# Patient Record
Sex: MALE | Race: WHITE | NOT HISPANIC OR LATINO | Employment: OTHER | URBAN - METROPOLITAN AREA
[De-identification: names, ages, dates, MRNs, and addresses within clinical notes are randomized per-mention and may not be internally consistent; named-entity substitution may affect disease eponyms.]

---

## 2017-01-03 ENCOUNTER — HOSPITAL ENCOUNTER (OUTPATIENT)
Dept: PHYSICAL THERAPY | Facility: HOSPITAL | Age: 62
Setting detail: RECURRING SERIES
Discharge: HOME | End: 2017-02-27
Attending: ORTHOPAEDIC SURGERY | Admitting: ORTHOPAEDIC SURGERY

## 2017-01-18 ENCOUNTER — HOSPITAL ENCOUNTER (OUTPATIENT)
Dept: ORTHOPEDIC SURGERY | Facility: CLINIC | Age: 62
Discharge: HOME | End: 2017-01-18
Attending: ORTHOPAEDIC SURGERY | Admitting: ORTHOPAEDIC SURGERY

## 2017-08-26 ENCOUNTER — HOSPITAL ENCOUNTER (OUTPATIENT)
Dept: SLEEP MEDICINE | Facility: HOSPITAL | Age: 62
Discharge: HOME | End: 2017-08-26
Attending: INTERNAL MEDICINE | Admitting: INTERNAL MEDICINE

## 2017-09-17 ENCOUNTER — HOSPITAL ENCOUNTER (OUTPATIENT)
Dept: SLEEP MEDICINE | Facility: HOSPITAL | Age: 62
Discharge: HOME | End: 2017-09-17
Attending: INTERNAL MEDICINE | Admitting: INTERNAL MEDICINE

## 2019-08-12 RX ORDER — DILTIAZEM HYDROCHLORIDE 360 MG/1
CAPSULE, EXTENDED RELEASE ORAL
Qty: 30 CAPSULE | Refills: 1 | Status: SHIPPED | OUTPATIENT
Start: 2019-08-12 | End: 2019-08-15 | Stop reason: SDUPTHER

## 2019-08-15 RX ORDER — DILTIAZEM HYDROCHLORIDE 360 MG/1
CAPSULE, EXTENDED RELEASE ORAL
Qty: 30 CAPSULE | Refills: 1 | Status: SHIPPED | OUTPATIENT
Start: 2019-08-15 | End: 2019-10-14 | Stop reason: SDUPTHER

## 2019-10-14 RX ORDER — DILTIAZEM HYDROCHLORIDE 360 MG/1
CAPSULE, EXTENDED RELEASE ORAL
Qty: 30 CAPSULE | Refills: 0 | Status: SHIPPED | OUTPATIENT
Start: 2019-10-14 | End: 2020-01-16

## 2019-12-16 RX ORDER — DILTIAZEM HYDROCHLORIDE 360 MG/1
CAPSULE, EXTENDED RELEASE ORAL
Qty: 30 CAPSULE | Refills: 0 | Status: SHIPPED | OUTPATIENT
Start: 2019-12-16 | End: 2020-06-16

## 2020-01-16 RX ORDER — DILTIAZEM HYDROCHLORIDE 360 MG/1
CAPSULE, EXTENDED RELEASE ORAL
Qty: 30 CAPSULE | Refills: 0 | Status: SHIPPED | OUTPATIENT
Start: 2020-01-16 | End: 2020-03-11

## 2020-02-06 ENCOUNTER — OFFICE (AMBULATORY)
Dept: URBAN - METROPOLITAN AREA PATHOLOGY 4 | Facility: PATHOLOGY | Age: 65
End: 2020-02-06
Payer: MEDICARE

## 2020-02-06 ENCOUNTER — OFFICE (AMBULATORY)
Dept: URBAN - METROPOLITAN AREA PATHOLOGY 4 | Facility: PATHOLOGY | Age: 65
End: 2020-02-06

## 2020-02-06 ENCOUNTER — ON CAMPUS - OUTPATIENT (AMBULATORY)
Dept: URBAN - METROPOLITAN AREA HOSPITAL 2 | Facility: HOSPITAL | Age: 65
End: 2020-02-06
Payer: COMMERCIAL

## 2020-02-06 ENCOUNTER — OFFICE (AMBULATORY)
Dept: URBAN - METROPOLITAN AREA PATHOLOGY 4 | Facility: PATHOLOGY | Age: 65
End: 2020-02-06
Payer: COMMERCIAL

## 2020-02-06 VITALS
RESPIRATION RATE: 18 BRPM | TEMPERATURE: 97 F | DIASTOLIC BLOOD PRESSURE: 75 MMHG | HEART RATE: 65 BPM | RESPIRATION RATE: 16 BRPM | DIASTOLIC BLOOD PRESSURE: 83 MMHG | HEART RATE: 59 BPM | OXYGEN SATURATION: 93 % | OXYGEN SATURATION: 96 % | HEIGHT: 72 IN | SYSTOLIC BLOOD PRESSURE: 125 MMHG | OXYGEN SATURATION: 99 % | DIASTOLIC BLOOD PRESSURE: 74 MMHG | SYSTOLIC BLOOD PRESSURE: 121 MMHG | SYSTOLIC BLOOD PRESSURE: 128 MMHG | WEIGHT: 247 LBS | DIASTOLIC BLOOD PRESSURE: 77 MMHG | SYSTOLIC BLOOD PRESSURE: 145 MMHG | HEART RATE: 51 BPM | SYSTOLIC BLOOD PRESSURE: 141 MMHG | DIASTOLIC BLOOD PRESSURE: 71 MMHG | DIASTOLIC BLOOD PRESSURE: 81 MMHG | HEART RATE: 57 BPM | OXYGEN SATURATION: 97 % | HEART RATE: 58 BPM | SYSTOLIC BLOOD PRESSURE: 143 MMHG | SYSTOLIC BLOOD PRESSURE: 131 MMHG | OXYGEN SATURATION: 95 % | DIASTOLIC BLOOD PRESSURE: 96 MMHG | HEART RATE: 64 BPM

## 2020-02-06 DIAGNOSIS — K64.0 FIRST DEGREE HEMORRHOIDS: ICD-10-CM

## 2020-02-06 DIAGNOSIS — D12.2 BENIGN NEOPLASM OF ASCENDING COLON: ICD-10-CM

## 2020-02-06 DIAGNOSIS — Z85.038 PERSONAL HISTORY OF OTHER MALIGNANT NEOPLASM OF LARGE INTEST: ICD-10-CM

## 2020-02-06 DIAGNOSIS — K57.30 DIVERTICULOSIS OF LARGE INTESTINE WITHOUT PERFORATION OR ABS: ICD-10-CM

## 2020-02-06 LAB
GI HISTOLOGY: A. UNSPECIFIED: (no result)
GI HISTOLOGY: PDF REPORT: (no result)

## 2020-02-06 PROCEDURE — 45385 COLONOSCOPY W/LESION REMOVAL: CPT | Mod: PT | Performed by: INTERNAL MEDICINE

## 2020-02-06 PROCEDURE — 88305 TISSUE EXAM BY PATHOLOGIST: CPT | Performed by: INTERNAL MEDICINE

## 2020-02-06 PROCEDURE — 88305 TISSUE EXAM BY PATHOLOGIST: CPT | Mod: 26 | Performed by: INTERNAL MEDICINE

## 2020-03-11 RX ORDER — DILTIAZEM HYDROCHLORIDE 360 MG/1
CAPSULE, EXTENDED RELEASE ORAL
Qty: 30 CAPSULE | Refills: 0 | Status: SHIPPED | OUTPATIENT
Start: 2020-03-11 | End: 2020-04-13

## 2020-03-18 RX ORDER — DILTIAZEM HYDROCHLORIDE 360 MG/1
CAPSULE, EXTENDED RELEASE ORAL
Qty: 30 CAPSULE | Refills: 0 | Status: SHIPPED | OUTPATIENT
Start: 2020-03-18 | End: 2020-05-18

## 2020-04-13 RX ORDER — DILTIAZEM HYDROCHLORIDE 360 MG/1
CAPSULE, EXTENDED RELEASE ORAL
Qty: 30 CAPSULE | Refills: 0 | Status: SHIPPED | OUTPATIENT
Start: 2020-04-13 | End: 2020-05-27 | Stop reason: SDUPTHER

## 2020-05-18 RX ORDER — DILTIAZEM HYDROCHLORIDE 360 MG/1
CAPSULE, EXTENDED RELEASE ORAL
Qty: 30 CAPSULE | Refills: 0 | Status: SHIPPED | OUTPATIENT
Start: 2020-05-18 | End: 2020-05-27 | Stop reason: SDUPTHER

## 2020-05-27 PROBLEM — E66.9 OBESITY: Status: ACTIVE | Noted: 2020-05-27

## 2020-05-27 PROBLEM — E78.5 HYPERLIPIDEMIA: Status: ACTIVE | Noted: 2020-05-27

## 2020-05-27 PROBLEM — I10 HYPERTENSION: Status: ACTIVE | Noted: 2020-05-27

## 2020-05-27 PROBLEM — E78.2 MIXED HYPERLIPIDEMIA: Status: ACTIVE | Noted: 2020-05-27

## 2020-05-27 PROBLEM — I48.0 PAROXYSMAL ATRIAL FIBRILLATION (HCC): Status: ACTIVE | Noted: 2020-05-27

## 2020-05-27 RX ORDER — ROSUVASTATIN CALCIUM 10 MG/1
TABLET, COATED ORAL EVERY 24 HOURS
COMMUNITY
Start: 2018-06-12 | End: 2022-06-01 | Stop reason: DRUGHIGH

## 2020-06-02 ENCOUNTER — OFFICE VISIT (OUTPATIENT)
Dept: CARDIOLOGY | Facility: CLINIC | Age: 65
End: 2020-06-02

## 2020-06-02 VITALS — OXYGEN SATURATION: 98 % | HEIGHT: 72 IN | WEIGHT: 244 LBS | HEART RATE: 61 BPM | BODY MASS INDEX: 33.05 KG/M2

## 2020-06-02 DIAGNOSIS — E78.2 MIXED HYPERLIPIDEMIA: ICD-10-CM

## 2020-06-02 DIAGNOSIS — I44.7 LEFT BUNDLE BRANCH BLOCK: Primary | ICD-10-CM

## 2020-06-02 DIAGNOSIS — I10 ESSENTIAL HYPERTENSION: ICD-10-CM

## 2020-06-02 DIAGNOSIS — I48.0 PAROXYSMAL ATRIAL FIBRILLATION (HCC): ICD-10-CM

## 2020-06-02 PROCEDURE — 99213 OFFICE O/P EST LOW 20 MIN: CPT | Performed by: INTERNAL MEDICINE

## 2020-06-02 PROCEDURE — 93000 ELECTROCARDIOGRAM COMPLETE: CPT | Performed by: INTERNAL MEDICINE

## 2020-06-02 RX ORDER — FINASTERIDE 5 MG/1
5 TABLET, FILM COATED ORAL DAILY
COMMUNITY
Start: 2020-05-14

## 2020-06-02 RX ORDER — SYRINGE AND NEEDLE,INSULIN,1ML 31 GX5/16"
SYRINGE, EMPTY DISPOSABLE MISCELLANEOUS SEE ADMIN INSTRUCTIONS
COMMUNITY
Start: 2020-03-05 | End: 2022-04-21

## 2020-06-02 RX ORDER — MELOXICAM 7.5 MG/1
15 TABLET ORAL
COMMUNITY
Start: 2020-05-14 | End: 2022-04-14 | Stop reason: DRUGHIGH

## 2020-06-02 NOTE — PROGRESS NOTES
"Cardiology Office Visit Note      Referring physician:  MD Madelaine    Reason For Followup: annual    HPI:  Dillon Aaron is a 64 y.o. male f/u hx atrial fib, HTN, hyplipidemia, MONICA with CPAP at night and LBBB. Hx also significant for colon CA.    Reports he  is typically very active for his age and still works full-time doing commercial construction projects.    CHADS   Score is 1.    He denies chest pain, dyspnea, PND, orthopnea, near syncope, or  lower extremity edema.  He has been compliant with his medications as well as CPAP at night.         Past Medical History:   Diagnosis Date   • Atrial fibrillation (CMS/HCC)    • Hyperlipidemia    • Hypertension        History reviewed. No pertinent surgical history.      Current Outpatient Medications:   •  aspirin 81 MG tablet, ASPIRIN 81 MG ORAL TABLET, Disp: , Rfl:   •  dilTIAZem (TIAZAC) 360 MG 24 hr capsule, TAKE 1 CAPSULE BY MOUTH ONE TIME A DAY , Disp: 30 capsule, Rfl: 0  •  EASY TOUCH INSULIN SYRINGE 31G X 5/16\" 1 ML misc, See Admin Instructions., Disp: , Rfl:   •  finasteride (PROSCAR) 5 MG tablet, , Disp: , Rfl:   •  meloxicam (MOBIC) 7.5 MG tablet, , Disp: , Rfl:   •  Multiple Vitamins-Minerals (MULTI VITAMIN/MINERALS) tablet, MULTI VITAMIN/MINERALS TABS, Disp: , Rfl:   •  rosuvastatin (Crestor) 10 MG tablet, Daily., Disp: , Rfl:     Social History     Socioeconomic History   • Marital status:      Spouse name: Not on file   • Number of children: Not on file   • Years of education: Not on file   • Highest education level: Not on file   Tobacco Use   • Smoking status: Never Smoker   • Smokeless tobacco: Never Used   Substance and Sexual Activity   • Alcohol use: Yes     Alcohol/week: 6.0 standard drinks     Types: 6 Cans of beer per week     Comment: a year    • Drug use: Never   • Sexual activity: Defer       Family History   Problem Relation Age of Onset   • Heart disease Mother    • Heart disease Father          Review of Systems   General: denies fever, " "chills, anorexia, weight loss  Eyes: denies blurring, diplopia  Ear/Nose/Throat: denies ear pain, nosebleeds, hoarseness  Cardiovascular: See HPI  Respiratory: denies excessive sputum, hemoptysis, wheezing  Gastrointestinal: denies nausea, vomiting, change in bowel habits, abdominal pain  Genitourinary: Occasional nocturia; denies hematuria or dysuria.  Musculoskeletal: Modest weightbearing arthralgias and occasional low back pain, though certainly not functionally limiting  Skin: denies rashes, itching, suspicious lesions  Neurologic: denies focal neuro deficits  Psychiatric: denies depression, anxiety  Endocrine: denies cold intolerance, heat intolerance  Hematologic/Lymphatic: denies abnormal bruising, bleeding  Allergic/Immunologic: denies urticaria or persistent infections      Objective     Visit Vitals  Pulse 61   Ht 182.9 cm (72.01\")   Wt 111 kg (244 lb)   SpO2 98%   BMI 33.08 kg/m²           Physical Exam  General:     Obese, well developed,, in no acute distress.    Head:     normocephalic and atraumatic.    Eyes:    PERRL/EOM intact, conjunctiva and sclera clear with out nystagmus.    Neck:    no jvd or bruits  Chest Wall:    no deformities   Lungs:    clear bilaterally to auscultation with adequate global airflow   Heart:    non-displaced PMI; regular rate and rhythm, normal S1, S2 without murmurs, rubs, or gallops  Abdomen:  Soft, nontender without HSM  Msk:    no deformity; adequate R OM  Pulses:    pulses normal in all 4 extremities.    Extremities:    no clubbing, cyanosis, edema   Neurologic:    no focal sensory or motor deficits  Skin:    intact without lesions or rashes.    Psych:    alert and cooperative; normal mood and affect; normal attention span and concentration.            ECG 12 Lead  Date/Time: 6/2/2020 3:39 PM  Performed by: UZMA Priest MD  Authorized by: UZMA Priest MD   Comparison: compared with previous ECG from 6/13/2019  Similar to previous ECG  Comments: SR LBBB HR " 61              Assessment:   Problems Addressed this Visit        Cardiovascular and Mediastinum    Mixed hyperlipidemia     Lipid abnormalities are maintained on Crestor; followed by PCP         Relevant Medications    rosuvastatin (Crestor) 10 MG tablet            Essential hypertension     Hypertension is well regulated on current medications                 Left bundle branch block - Primary     Unchanged; asymptomatic         Relevant Orders    ECG 12 Lead    Paroxysmal atrial fibrillation (CMS/HCC)     Generally maintained in sinus rhythm on diltiazem  mg 1 daily.  He is not on anticoagulant therapy; has chads score of 1         Relevant Orders    ECG 12 Lead            Plan:  Mr. katz advised to continue with current medications as listed reviewed in detail today.  Weight reduction regular breast exercise are still encouraged.  We hope to see Her for follow-up in 1 year or sooner if needed.    UZMA Priest MD  6/3/2020 19:05    This report was generated using the Dragon voice recognition system.

## 2020-06-03 NOTE — ASSESSMENT & PLAN NOTE
Generally maintained in sinus rhythm on diltiazem  mg 1 daily.  He is not on anticoagulant therapy; has chads score of 1

## 2020-06-16 RX ORDER — DILTIAZEM HYDROCHLORIDE 360 MG/1
CAPSULE, EXTENDED RELEASE ORAL
Qty: 30 CAPSULE | Refills: 0 | Status: SHIPPED | OUTPATIENT
Start: 2020-06-16 | End: 2020-08-19 | Stop reason: SDUPTHER

## 2020-08-19 ENCOUNTER — TELEPHONE (OUTPATIENT)
Dept: CARDIOLOGY | Facility: CLINIC | Age: 65
End: 2020-08-19

## 2020-08-19 RX ORDER — DILTIAZEM HYDROCHLORIDE 360 MG/1
360 CAPSULE, EXTENDED RELEASE ORAL DAILY
Qty: 30 CAPSULE | Refills: 5 | Status: SHIPPED | OUTPATIENT
Start: 2020-08-19 | End: 2021-02-10 | Stop reason: SDUPTHER

## 2021-02-10 RX ORDER — DILTIAZEM HYDROCHLORIDE 360 MG/1
360 CAPSULE, EXTENDED RELEASE ORAL DAILY
Qty: 90 CAPSULE | Refills: 1 | Status: SHIPPED | OUTPATIENT
Start: 2021-02-10 | End: 2021-08-09

## 2021-05-20 ENCOUNTER — OFFICE VISIT (OUTPATIENT)
Dept: ORTHOPEDIC SURGERY | Facility: CLINIC | Age: 66
End: 2021-05-20

## 2021-05-20 VITALS
HEIGHT: 72 IN | DIASTOLIC BLOOD PRESSURE: 73 MMHG | SYSTOLIC BLOOD PRESSURE: 136 MMHG | BODY MASS INDEX: 32.91 KG/M2 | WEIGHT: 243 LBS | HEART RATE: 52 BPM

## 2021-05-20 DIAGNOSIS — S42.302D CLOSED FRACTURE OF SHAFT OF LEFT HUMERUS WITH ROUTINE HEALING, UNSPECIFIED FRACTURE MORPHOLOGY, SUBSEQUENT ENCOUNTER: ICD-10-CM

## 2021-05-20 DIAGNOSIS — M79.602 LEFT ARM PAIN: Primary | ICD-10-CM

## 2021-05-20 PROCEDURE — 99203 OFFICE O/P NEW LOW 30 MIN: CPT | Performed by: ORTHOPAEDIC SURGERY

## 2021-05-20 NOTE — PROGRESS NOTES
"     Patient ID: Dillon Aaron is a 65 y.o. male.    Chief Complaint:    Chief Complaint   Patient presents with   • Follow-up     left humerus       HPI:  Dillon is a 65-year-old gentleman who had surgery for left proximal humerus fracture dislocation in September 2016.  He went on to heal.  He has had a little bit of some tingling in the area as well as some bumps in the soft tissues which his wife was concerned about.  Functionally he is doing very well with little to no pain  Past Medical History:   Diagnosis Date   • Atrial fibrillation (CMS/HCC)    • Hyperlipidemia    • Hypertension        History reviewed. No pertinent surgical history.    Family History   Problem Relation Age of Onset   • Heart disease Mother    • Heart disease Father           Social History     Occupational History   • Not on file   Tobacco Use   • Smoking status: Never Smoker   • Smokeless tobacco: Never Used   Substance and Sexual Activity   • Alcohol use: Yes     Alcohol/week: 6.0 standard drinks     Types: 6 Cans of beer per week     Comment: a year    • Drug use: Never   • Sexual activity: Defer      Review of Systems   Cardiovascular: Negative for chest pain.   Musculoskeletal: Positive for arthralgias.       Objective:    /73   Pulse 52   Ht 182.9 cm (72.01\")   Wt 110 kg (243 lb)   BMI 32.95 kg/m²     Physical Examination:  Left shoulder demonstrates healed incisions.  There is some mild scar formation at the deltoid insertion.  No tenderness.  Active elevation 170 abduction 130 external rotation 30  Sensory and motor exam are intact all distributions. Radial pulse is palpable and capillary refill is less than two seconds to all digits    Imaging:  left humerus X-Ray  Indication: Postop ORIF humerus  AP and lateral  Findings: Healed fracture hardware in position  no bony lesion  Soft tissues normal  not examined joint spaces  Hardware appropriately positioned yes      yes prior studies available for " comparison    Assessment:  Healed humerus fracture    Plan:  Discussed the findings on x-ray and examination today.  They are all are benign findings.  Activity as tolerated and see me as needed      Procedures         Disclaimer: Please note that areas of this note were completed with computer voice recognition software.  Quite often unanticipated grammatical, syntax, homophones, and other interpretive errors are inadvertently transcribed by the computer software. Please excuse any errors that have escaped final proofreading.

## 2021-06-01 ENCOUNTER — OFFICE VISIT (OUTPATIENT)
Dept: CARDIOLOGY | Facility: CLINIC | Age: 66
End: 2021-06-01

## 2021-06-01 VITALS
OXYGEN SATURATION: 98 % | HEART RATE: 57 BPM | BODY MASS INDEX: 32.64 KG/M2 | WEIGHT: 241 LBS | SYSTOLIC BLOOD PRESSURE: 130 MMHG | DIASTOLIC BLOOD PRESSURE: 68 MMHG | HEIGHT: 72 IN

## 2021-06-01 DIAGNOSIS — I10 ESSENTIAL HYPERTENSION: ICD-10-CM

## 2021-06-01 DIAGNOSIS — I48.0 PAROXYSMAL ATRIAL FIBRILLATION (HCC): Primary | ICD-10-CM

## 2021-06-01 PROCEDURE — 93000 ELECTROCARDIOGRAM COMPLETE: CPT | Performed by: INTERNAL MEDICINE

## 2021-06-01 PROCEDURE — 99213 OFFICE O/P EST LOW 20 MIN: CPT | Performed by: INTERNAL MEDICINE

## 2021-06-01 NOTE — PROGRESS NOTES
"Cardiology Office Visit Note      Referring physician: Tre Burkett MD     Reason For Followup: Annual check up    HPI:  Dillon Aaron is a 65 y.o. male who returns to the office for an annual check up. He has a known history o previous f atrial fib, HTN, hyplipidemia, MONICA with CPAP at night and LBBB and colon CA.    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    CHADS   Score is 1.  He denies any breakthrough PAF episodes and has no other symptoms of chest pain, increased shortness of air, PND, orthopnea, bipedal edema or near syncopal episodes.  Dillon denies any additional constitutional symptoms and has completed COVID-19 vaccination.    His medication list was reviewed during his visit today      Past Medical History:   Diagnosis Date   • Atrial fibrillation (CMS/HCC)    • Hyperlipidemia    • Hypertension        History reviewed. No pertinent surgical history.      Current Outpatient Medications:   •  dilTIAZem (TIAZAC) 360 MG 24 hr capsule, Take 1 capsule by mouth Daily., Disp: 90 capsule, Rfl: 1  •  finasteride (PROSCAR) 5 MG tablet, , Disp: , Rfl:   •  meloxicam (MOBIC) 7.5 MG tablet, 15 mg., Disp: , Rfl:   •  rosuvastatin (Crestor) 10 MG tablet, Daily., Disp: , Rfl:   •  EASY TOUCH INSULIN SYRINGE 31G X 5/16\" 1 ML misc, See Admin Instructions., Disp: , Rfl:     Social History     Socioeconomic History   • Marital status:      Spouse name: Not on file   • Number of children: Not on file   • Years of education: Not on file   • Highest education level: Not on file   Tobacco Use   • Smoking status: Never Smoker   • Smokeless tobacco: Never Used   Vaping Use   • Vaping Use: Never used   Substance and Sexual Activity   • Alcohol use: Yes     Alcohol/week: 6.0 standard drinks     Types: 6 Cans of beer per week     Comment: a year    • Drug use: Never   • Sexual activity: Defer       Family " "History   Problem Relation Age of Onset   • Heart disease Mother    • Heart disease Father          Review of Systems   General: denies fever, chills, anorexia, weight loss  Eyes: denies blurring, diplopia  Ear/Nose/Throat: denies ear pain, nosebleeds, hoarseness  Cardiovascular: See HPI  Respiratory: denies excessive sputum, hemoptysis, wheezing  Gastrointestinal: denies nausea, vomiting, change in bowel habits, abdominal pain  Genitourinary: Denies hematuria dysuria and only occasional nocturia  Musculoskeletal: Minor generalized arthralgias though none are functionally limiting  Skin: denies rashes, itching, suspicious lesions  Neurologic: denies focal neuro deficits  Psychiatric: denies depression, anxiety  Endocrine: denies cold intolerance, heat intolerance  Hematologic/Lymphatic: denies abnormal bruising, bleeding  Allergic/Immunologic: denies urticaria or persistent infections      Objective     Visit Vitals  /68   Pulse 57   Ht 182.9 cm (72\")   Wt 109 kg (241 lb)   SpO2 98%   BMI 32.69 kg/m²           Physical Exam  General:     Obese, well developed,, in no acute distress.    Head:     normocephalic and atraumatic.    Eyes:    PERRL/EOM intact, conjunctiva and sclera clear with out nystagmus.    Neck:    no jvd or bruits  Chest Wall:    no deformities   Lungs:    clear bilaterally to auscultation with adequate global airflow   Heart:    non-displaced PMI; regular rate and rhythm, normal S1, S2 without murmurs, rubs, or gallops  Abdomen:  Soft, nontender without HSM  Msk:    no deformity; adequate R OM  Pulses:    pulses normal in all 4 extremities.    Extremities:    no clubbing, cyanosis, edema   Neurologic:    no focal sensory or motor deficits  Skin:    intact without lesions or rashes.    Psych:    alert and cooperative; normal mood and affect; normal attention span and concentration.            ECG 12 Lead    Date/Time: 6/1/2021 4:02 PM  Performed by: UZMA Priest MD  Authorized by: Cem, " UZMA Smith MD   Comparison: compared with previous ECG from 6/2/2020  Similar to previous ECG  Rhythm: sinus bradycardia  Conduction: left bundle branch block    Clinical impression: abnormal EKG              Assessment:   Problems Addressed this Visit        Other    Essential hypertension  -Remains well-regulated on current medications listed reviewed      Paroxysmal atrial fibrillation (CMS/HCC) - Primary  -Maintained in sinus rhythm on diltiazem CD  -Not on anticoagulant therapy; chads score is 1    Left bundle branch block-unchanged    MONICA-maintained on CPAP      Diagnoses       Codes Comments    Paroxysmal atrial fibrillation (CMS/HCC)    -  Primary ICD-10-CM: I48.0  ICD-9-CM: 427.31     Essential hypertension     ICD-10-CM: I10  ICD-9-CM: 401.9             Plan:  Overall Mr. Aaron continues to do quite well with no specific complaints.  He is advised continue with current medication listed and reviewed in detail today and continue to work on weight reduction continue regular progressive exercise with healthy lifestyles.  Return to clinic annually or sooner if needed.    UZMA Priest MD  6/21/2021 01:52 EDT    This report was generated using the Dragon voice recognition system.

## 2021-08-09 RX ORDER — DILTIAZEM HYDROCHLORIDE 360 MG/1
CAPSULE, EXTENDED RELEASE ORAL
Qty: 90 CAPSULE | Refills: 2 | Status: SHIPPED | OUTPATIENT
Start: 2021-08-09 | End: 2022-04-12

## 2022-04-11 ENCOUNTER — LAB (OUTPATIENT)
Dept: LAB | Facility: HOSPITAL | Age: 67
End: 2022-04-11

## 2022-04-11 ENCOUNTER — TRANSCRIBE ORDERS (OUTPATIENT)
Dept: ADMINISTRATIVE | Facility: HOSPITAL | Age: 67
End: 2022-04-11

## 2022-04-11 ENCOUNTER — HOSPITAL ENCOUNTER (OUTPATIENT)
Dept: CARDIOLOGY | Facility: HOSPITAL | Age: 67
Discharge: HOME | End: 2022-04-11

## 2022-04-11 DIAGNOSIS — Z01.818 PRE-OP TESTING: Primary | ICD-10-CM

## 2022-04-11 DIAGNOSIS — Z01.818 PRE-OP TESTING: ICD-10-CM

## 2022-04-11 LAB
ALBUMIN SERPL-MCNC: 4.9 G/DL (ref 3.5–5.2)
ANION GAP SERPL CALCULATED.3IONS-SCNC: 9.5 MMOL/L (ref 5–15)
BASOPHILS # BLD AUTO: 0.02 10*3/MM3 (ref 0–0.2)
BASOPHILS NFR BLD AUTO: 0.3 % (ref 0–1.5)
BUN SERPL-MCNC: 20 MG/DL (ref 8–23)
BUN/CREAT SERPL: 19.6 (ref 7–25)
CALCIUM SPEC-SCNC: 9.8 MG/DL (ref 8.6–10.5)
CHLORIDE SERPL-SCNC: 104 MMOL/L (ref 98–107)
CO2 SERPL-SCNC: 26.5 MMOL/L (ref 22–29)
CREAT SERPL-MCNC: 1.02 MG/DL (ref 0.76–1.27)
DEPRECATED RDW RBC AUTO: 44.3 FL (ref 37–54)
EGFRCR SERPLBLD CKD-EPI 2021: 80.6 ML/MIN/1.73
EOSINOPHIL # BLD AUTO: 0.03 10*3/MM3 (ref 0–0.4)
EOSINOPHIL NFR BLD AUTO: 0.5 % (ref 0.3–6.2)
ERYTHROCYTE [DISTWIDTH] IN BLOOD BY AUTOMATED COUNT: 12.9 % (ref 12.3–15.4)
GLUCOSE SERPL-MCNC: 112 MG/DL (ref 65–99)
HBA1C MFR BLD: 6 % (ref 3.5–5.6)
HCT VFR BLD AUTO: 43.1 % (ref 37.5–51)
HGB BLD-MCNC: 14.8 G/DL (ref 13–17.7)
IMM GRANULOCYTES # BLD AUTO: 0.02 10*3/MM3 (ref 0–0.05)
IMM GRANULOCYTES NFR BLD AUTO: 0.3 % (ref 0–0.5)
LYMPHOCYTES # BLD AUTO: 2.34 10*3/MM3 (ref 0.7–3.1)
LYMPHOCYTES NFR BLD AUTO: 36.2 % (ref 19.6–45.3)
MCH RBC QN AUTO: 32.7 PG (ref 26.6–33)
MCHC RBC AUTO-ENTMCNC: 34.3 G/DL (ref 31.5–35.7)
MCV RBC AUTO: 95.1 FL (ref 79–97)
MONOCYTES # BLD AUTO: 0.48 10*3/MM3 (ref 0.1–0.9)
MONOCYTES NFR BLD AUTO: 7.4 % (ref 5–12)
MRSA DNA SPEC QL NAA+PROBE: NORMAL
NEUTROPHILS NFR BLD AUTO: 3.57 10*3/MM3 (ref 1.7–7)
NEUTROPHILS NFR BLD AUTO: 55.3 % (ref 42.7–76)
NRBC BLD AUTO-RTO: 0 /100 WBC (ref 0–0.2)
PLATELET # BLD AUTO: 237 10*3/MM3 (ref 140–450)
PMV BLD AUTO: 10 FL (ref 6–12)
POTASSIUM SERPL-SCNC: 4 MMOL/L (ref 3.5–5.2)
QT INTERVAL: 442 MS
RBC # BLD AUTO: 4.53 10*6/MM3 (ref 4.14–5.8)
SODIUM SERPL-SCNC: 140 MMOL/L (ref 136–145)
WBC NRBC COR # BLD: 6.46 10*3/MM3 (ref 3.4–10.8)

## 2022-04-11 PROCEDURE — 87641 MR-STAPH DNA AMP PROBE: CPT

## 2022-04-11 PROCEDURE — 36415 COLL VENOUS BLD VENIPUNCTURE: CPT

## 2022-04-11 PROCEDURE — 80048 BASIC METABOLIC PNL TOTAL CA: CPT

## 2022-04-11 PROCEDURE — 93010 ELECTROCARDIOGRAM REPORT: CPT | Performed by: INTERNAL MEDICINE

## 2022-04-11 PROCEDURE — 82040 ASSAY OF SERUM ALBUMIN: CPT

## 2022-04-11 PROCEDURE — 93005 ELECTROCARDIOGRAM TRACING: CPT | Performed by: ORTHOPAEDIC SURGERY

## 2022-04-11 PROCEDURE — 83036 HEMOGLOBIN GLYCOSYLATED A1C: CPT

## 2022-04-11 PROCEDURE — 85025 COMPLETE CBC W/AUTO DIFF WBC: CPT

## 2022-04-12 RX ORDER — DILTIAZEM HYDROCHLORIDE 360 MG/1
CAPSULE, EXTENDED RELEASE ORAL
Qty: 30 CAPSULE | Refills: 0 | Status: SHIPPED | OUTPATIENT
Start: 2022-04-12 | End: 2022-05-09 | Stop reason: SDUPTHER

## 2022-04-14 ENCOUNTER — OFFICE VISIT (OUTPATIENT)
Dept: CARDIOLOGY | Facility: CLINIC | Age: 67
End: 2022-04-14

## 2022-04-14 VITALS
OXYGEN SATURATION: 97 % | HEIGHT: 72 IN | WEIGHT: 251 LBS | DIASTOLIC BLOOD PRESSURE: 86 MMHG | BODY MASS INDEX: 34 KG/M2 | SYSTOLIC BLOOD PRESSURE: 152 MMHG | HEART RATE: 56 BPM

## 2022-04-14 DIAGNOSIS — Z01.810 PRE-OPERATIVE CARDIOVASCULAR EXAMINATION: ICD-10-CM

## 2022-04-14 DIAGNOSIS — R06.09 DYSPNEA ON EXERTION: ICD-10-CM

## 2022-04-14 DIAGNOSIS — I44.7 LBBB (LEFT BUNDLE BRANCH BLOCK): Primary | ICD-10-CM

## 2022-04-14 DIAGNOSIS — I48.0 PAROXYSMAL ATRIAL FIBRILLATION: ICD-10-CM

## 2022-04-14 DIAGNOSIS — M25.552 HIP PAIN, CHRONIC, LEFT: ICD-10-CM

## 2022-04-14 DIAGNOSIS — I10 ESSENTIAL HYPERTENSION: ICD-10-CM

## 2022-04-14 DIAGNOSIS — E78.2 MIXED HYPERLIPIDEMIA: ICD-10-CM

## 2022-04-14 DIAGNOSIS — G89.29 HIP PAIN, CHRONIC, LEFT: ICD-10-CM

## 2022-04-14 PROCEDURE — 99213 OFFICE O/P EST LOW 20 MIN: CPT | Performed by: NURSE PRACTITIONER

## 2022-04-14 RX ORDER — MELOXICAM 15 MG/1
15 TABLET ORAL DAILY
COMMUNITY
Start: 2022-02-07 | End: 2022-12-06

## 2022-04-14 NOTE — H&P (VIEW-ONLY)
Cardiology Office Follow Up Visit      Primary Care Provider:  Tre Burkett MD    Reason for f/u:     Pre operative risk assessment      Subjective     CC:    Pre operative risk assessment for left anterior total hip arthroplasty, dyspnea on exertion    History of Present Illness       Dillon Aaron is a 66 y.o. male who previously followed with Dr Priest. Pmh includes dyslipidemia, HTN, paroxysmal atrial fibrillation CHADS VAsc 2- not previously on anticoagulation or ASA therapy, on diltiazem, arthritis, left hip pain to undergo left hip arthroplasty coming up.    Mr. Aaron presents today for per operative risk assessment prior to left anterior total hip arthroplasty scheduled on 4/27/2022. He reports his functional status is limited secondary to left hip pain.  He has underlying left bundle branch block on ECG. He reports dyspnea when he exerts himself. He was hospitalized many years ago for atrial fibrillation and thinks he may have undergone left heart catheterization but is not sure.  Nonetheless, it was greater than 5 years ago. He has not had stress testing or ischemic evaluation recently. He occasionally has palpitations but is SR today. We discussed anticoagulation, he is not on anticoagulation or ASA. His blood pressure is slightly elevated today on diltiazem.       ASSESSMENT/PLAN:      Diagnoses and all orders for this visit:    1. LBBB (left bundle branch block) (Primary)  -     Stress Test With Myocardial Perfusion (1 Day); Future    2. Pre-operative cardiovascular examination    3. Hip pain, chronic, left  Comments:  scheduled to undergo L anterior total hip arthroplasty 4/27    4. Dyspnea on exertion   -     Stress Test With Myocardial Perfusion (1 Day); Future    5. Paroxysmal atrial fibrillation (HCC)  Comments:  CHADS VAsc 2- (HTN, age)- historically has not been on anticoagulation    6. Mixed hyperlipidemia    7. Essential hypertension      MEDICAL DECISION MAKING:  Mr. Aaron presents for  "preoperative cardiac risk assessment prior to non cardiac / non vascular surgery. He is to undergo left anterior total hip arthroplasty. He has abnormal ECG with underlying left bundle branch block. He also has dyspnea on exertion. He has h/o paroxysmal atrial fibrillation,  He is in SR today. He has never been on anticoagulation. He has CHADS score 2. We did discuss anticoagulation however he will be undergoing surgery hopefully soon. He should do at minimum a full dose ASA.     I will order a lexiscan stress to further risk stratify patient given his dyspnea on exertion and underlying left bundle branch block.  Further recommendations will be given once test results are back.     I have scheduled him to see us back in 3 months. At that time we can re visit anticoagulation recommendations.     Past Medical History:   Diagnosis Date   • Atrial fibrillation (HCC)    • Hyperlipidemia    • Hypertension        History reviewed. No pertinent surgical history.      Current Outpatient Medications:   •  dilTIAZem (TIAZAC) 360 MG 24 hr capsule, TAKE 1 CAPSULE BY MOUTH EVERY DAY , Disp: 30 capsule, Rfl: 0  •  EASY TOUCH INSULIN SYRINGE 31G X 5/16\" 1 ML misc, See Admin Instructions., Disp: , Rfl:   •  finasteride (PROSCAR) 5 MG tablet, , Disp: , Rfl:   •  meloxicam (MOBIC) 15 MG tablet, Take 15 mg by mouth Daily., Disp: , Rfl:   •  rosuvastatin (CRESTOR) 10 MG tablet, Daily., Disp: , Rfl:     Social History     Socioeconomic History   • Marital status:    Tobacco Use   • Smoking status: Never Smoker   • Smokeless tobacco: Never Used   Vaping Use   • Vaping Use: Never used   Substance and Sexual Activity   • Alcohol use: Yes     Alcohol/week: 6.0 standard drinks     Types: 6 Cans of beer per week     Comment: a year    • Drug use: Never   • Sexual activity: Defer       Family History   Problem Relation Age of Onset   • Heart disease Mother    • Heart disease Father        The following portions of the patient's history " "were reviewed and updated as appropriate: allergies, current medications, past family history, past medical history, past social history, past surgical history and problem list.    Review of Systems   Constitutional: Negative for chills, diaphoresis and malaise/fatigue.   Cardiovascular: Positive for dyspnea on exertion and palpitations. Negative for chest pain, irregular heartbeat, leg swelling, near-syncope, orthopnea, paroxysmal nocturnal dyspnea and syncope.   Respiratory: Negative for cough, shortness of breath, sleep disturbances due to breathing and sputum production.    Gastrointestinal: Negative for change in bowel habit.   Genitourinary: Negative for urgency.   Neurological: Negative for dizziness and headaches.   Psychiatric/Behavioral: Negative for altered mental status.     /86   Pulse 56   Ht 182.9 cm (72.01\")   Wt 114 kg (251 lb)   SpO2 97%   BMI 34.03 kg/m² .  Objective     Constitutional:       Appearance: Not in distress.   Neck:      Vascular: JVD normal.   Pulmonary:      Effort: Pulmonary effort is normal.      Breath sounds: Normal breath sounds.   Cardiovascular:      Normal rate. Regular rhythm.   Pulses:     Intact distal pulses.   Edema:     Peripheral edema absent.   Abdominal:      General: Bowel sounds are normal.      Palpations: Abdomen is soft.   Musculoskeletal: Normal range of motion. Skin:     General: Skin is warm and dry.   Neurological:      General: No focal deficit present.      Mental Status: Oriented to person, place and time.           Procedures    EKG ordered by and reviewed by me in office               "

## 2022-04-19 ENCOUNTER — HOSPITAL ENCOUNTER (OUTPATIENT)
Dept: NUCLEAR MEDICINE | Facility: HOSPITAL | Age: 67
Discharge: HOME | End: 2022-04-19

## 2022-04-19 DIAGNOSIS — I44.7 LBBB (LEFT BUNDLE BRANCH BLOCK): ICD-10-CM

## 2022-04-19 DIAGNOSIS — R06.09 DYSPNEA ON EXERTION: ICD-10-CM

## 2022-04-19 PROCEDURE — 0 TECHNETIUM TETROFOSMIN KIT: Performed by: NURSE PRACTITIONER

## 2022-04-19 PROCEDURE — 93017 CV STRESS TEST TRACING ONLY: CPT

## 2022-04-19 PROCEDURE — 93016 CV STRESS TEST SUPVJ ONLY: CPT | Performed by: INTERNAL MEDICINE

## 2022-04-19 PROCEDURE — A9502 TC99M TETROFOSMIN: HCPCS | Performed by: NURSE PRACTITIONER

## 2022-04-19 PROCEDURE — 93018 CV STRESS TEST I&R ONLY: CPT | Performed by: INTERNAL MEDICINE

## 2022-04-19 PROCEDURE — 78452 HT MUSCLE IMAGE SPECT MULT: CPT

## 2022-04-19 PROCEDURE — 78452 HT MUSCLE IMAGE SPECT MULT: CPT | Performed by: INTERNAL MEDICINE

## 2022-04-19 PROCEDURE — 25010000002 REGADENOSON 0.4 MG/5ML SOLUTION: Performed by: NURSE PRACTITIONER

## 2022-04-19 RX ADMIN — TETROFOSMIN 1 DOSE: 1.38 INJECTION, POWDER, LYOPHILIZED, FOR SOLUTION INTRAVENOUS at 11:18

## 2022-04-19 RX ADMIN — REGADENOSON 0.4 MG: 0.08 INJECTION, SOLUTION INTRAVENOUS at 11:18

## 2022-04-19 RX ADMIN — TETROFOSMIN 1 DOSE: 1.38 INJECTION, POWDER, LYOPHILIZED, FOR SOLUTION INTRAVENOUS at 10:32

## 2022-04-20 ENCOUNTER — OFFICE VISIT (OUTPATIENT)
Dept: CARDIOLOGY | Facility: CLINIC | Age: 67
End: 2022-04-20

## 2022-04-20 VITALS
DIASTOLIC BLOOD PRESSURE: 82 MMHG | BODY MASS INDEX: 33.94 KG/M2 | WEIGHT: 250.6 LBS | RESPIRATION RATE: 18 BRPM | HEART RATE: 67 BPM | HEIGHT: 72 IN | SYSTOLIC BLOOD PRESSURE: 138 MMHG

## 2022-04-20 DIAGNOSIS — R94.39 ABNORMAL STRESS TEST: Primary | ICD-10-CM

## 2022-04-20 DIAGNOSIS — Z01.818 PRE-OP TESTING: ICD-10-CM

## 2022-04-20 DIAGNOSIS — I48.0 PAROXYSMAL ATRIAL FIBRILLATION: ICD-10-CM

## 2022-04-20 LAB
BH CV NUCLEAR PRIOR STUDY: 3
BH CV REST NUCLEAR ISOTOPE DOSE: 7.3 MCI
BH CV STRESS NUCLEAR ISOTOPE DOSE: 21.9 MCI
LV EF NUC BP: 51 %
MAXIMAL PREDICTED HEART RATE: 153 BPM
STRESS TARGET HR: 130 BPM

## 2022-04-20 PROCEDURE — 99214 OFFICE O/P EST MOD 30 MIN: CPT | Performed by: INTERNAL MEDICINE

## 2022-04-22 ENCOUNTER — LAB (OUTPATIENT)
Dept: LAB | Facility: HOSPITAL | Age: 67
End: 2022-04-22

## 2022-04-22 ENCOUNTER — HOSPITAL ENCOUNTER (OUTPATIENT)
Facility: HOSPITAL | Age: 67
Setting detail: HOSPITAL OUTPATIENT SURGERY
Discharge: HOME | End: 2022-04-22
Attending: INTERNAL MEDICINE | Admitting: INTERNAL MEDICINE

## 2022-04-22 VITALS
HEIGHT: 70 IN | WEIGHT: 246.69 LBS | RESPIRATION RATE: 13 BRPM | DIASTOLIC BLOOD PRESSURE: 90 MMHG | HEART RATE: 60 BPM | TEMPERATURE: 99 F | SYSTOLIC BLOOD PRESSURE: 121 MMHG | OXYGEN SATURATION: 98 % | BODY MASS INDEX: 35.32 KG/M2

## 2022-04-22 DIAGNOSIS — Z01.818 PREOP TESTING: Primary | ICD-10-CM

## 2022-04-22 DIAGNOSIS — R94.39 ABNORMAL STRESS TEST: ICD-10-CM

## 2022-04-22 LAB
ACT BLD: 249 SECONDS (ref 89–137)
ALBUMIN SERPL-MCNC: 4.5 G/DL (ref 3.5–5.2)
ALBUMIN/GLOB SERPL: 1.9 G/DL
ALP SERPL-CCNC: 68 U/L (ref 39–117)
ALT SERPL W P-5'-P-CCNC: 26 U/L (ref 1–41)
ANION GAP SERPL CALCULATED.3IONS-SCNC: 10 MMOL/L (ref 5–15)
AST SERPL-CCNC: 17 U/L (ref 1–40)
BASOPHILS # BLD AUTO: 0 10*3/MM3 (ref 0–0.2)
BASOPHILS NFR BLD AUTO: 0.5 % (ref 0–1.5)
BILIRUB SERPL-MCNC: 0.7 MG/DL (ref 0–1.2)
BUN SERPL-MCNC: 19 MG/DL (ref 8–23)
BUN/CREAT SERPL: 16.7 (ref 7–25)
CALCIUM SPEC-SCNC: 9.6 MG/DL (ref 8.6–10.5)
CHLORIDE SERPL-SCNC: 105 MMOL/L (ref 98–107)
CO2 SERPL-SCNC: 26 MMOL/L (ref 22–29)
CREAT SERPL-MCNC: 1.14 MG/DL (ref 0.76–1.27)
DEPRECATED RDW RBC AUTO: 44.6 FL (ref 37–54)
EGFRCR SERPLBLD CKD-EPI 2021: 70.5 ML/MIN/1.73
EOSINOPHIL # BLD AUTO: 0.1 10*3/MM3 (ref 0–0.4)
EOSINOPHIL NFR BLD AUTO: 1 % (ref 0.3–6.2)
ERYTHROCYTE [DISTWIDTH] IN BLOOD BY AUTOMATED COUNT: 13.5 % (ref 12.3–15.4)
GLOBULIN UR ELPH-MCNC: 2.4 GM/DL
GLUCOSE SERPL-MCNC: 121 MG/DL (ref 65–99)
HCT VFR BLD AUTO: 42.7 % (ref 37.5–51)
HGB BLD-MCNC: 14.4 G/DL (ref 13–17.7)
INR PPP: 1 (ref 0.93–1.1)
LYMPHOCYTES # BLD AUTO: 2.6 10*3/MM3 (ref 0.7–3.1)
LYMPHOCYTES NFR BLD AUTO: 43.3 % (ref 19.6–45.3)
MCH RBC QN AUTO: 31.8 PG (ref 26.6–33)
MCHC RBC AUTO-ENTMCNC: 33.8 G/DL (ref 31.5–35.7)
MCV RBC AUTO: 94 FL (ref 79–97)
MONOCYTES # BLD AUTO: 0.4 10*3/MM3 (ref 0.1–0.9)
MONOCYTES NFR BLD AUTO: 6.7 % (ref 5–12)
NEUTROPHILS NFR BLD AUTO: 2.9 10*3/MM3 (ref 1.7–7)
NEUTROPHILS NFR BLD AUTO: 48.5 % (ref 42.7–76)
NRBC BLD AUTO-RTO: 0.1 /100 WBC (ref 0–0.2)
PLATELET # BLD AUTO: 210 10*3/MM3 (ref 140–450)
PMV BLD AUTO: 7.3 FL (ref 6–12)
POTASSIUM SERPL-SCNC: 4.3 MMOL/L (ref 3.5–5.2)
PROT SERPL-MCNC: 6.9 G/DL (ref 6–8.5)
PROTHROMBIN TIME: 10.3 SECONDS (ref 9.6–11.7)
RBC # BLD AUTO: 4.54 10*6/MM3 (ref 4.14–5.8)
SARS-COV-2 RNA RESP QL NAA+PROBE: NOT DETECTED
SODIUM SERPL-SCNC: 141 MMOL/L (ref 136–145)
WBC NRBC COR # BLD: 5.9 10*3/MM3 (ref 3.4–10.8)

## 2022-04-22 PROCEDURE — 80053 COMPREHEN METABOLIC PANEL: CPT | Performed by: INTERNAL MEDICINE

## 2022-04-22 PROCEDURE — 36415 COLL VENOUS BLD VENIPUNCTURE: CPT | Performed by: INTERNAL MEDICINE

## 2022-04-22 PROCEDURE — 85610 PROTHROMBIN TIME: CPT | Performed by: INTERNAL MEDICINE

## 2022-04-22 PROCEDURE — 99152 MOD SED SAME PHYS/QHP 5/>YRS: CPT | Performed by: INTERNAL MEDICINE

## 2022-04-22 PROCEDURE — C1769 GUIDE WIRE: HCPCS | Performed by: INTERNAL MEDICINE

## 2022-04-22 PROCEDURE — 93458 L HRT ARTERY/VENTRICLE ANGIO: CPT | Performed by: INTERNAL MEDICINE

## 2022-04-22 PROCEDURE — C1894 INTRO/SHEATH, NON-LASER: HCPCS | Performed by: INTERNAL MEDICINE

## 2022-04-22 PROCEDURE — 25010000002 FENTANYL CITRATE (PF) 100 MCG/2ML SOLUTION: Performed by: INTERNAL MEDICINE

## 2022-04-22 PROCEDURE — 25010000002 HEPARIN (PORCINE) PER 1000 UNITS: Performed by: INTERNAL MEDICINE

## 2022-04-22 PROCEDURE — 25010000002 DIPHENHYDRAMINE PER 50 MG: Performed by: INTERNAL MEDICINE

## 2022-04-22 PROCEDURE — 25010000002 ADENOSINE (DIAGNOSTIC) PER 30 MG: Performed by: INTERNAL MEDICINE

## 2022-04-22 PROCEDURE — 0 IOPAMIDOL PER 1 ML: Performed by: INTERNAL MEDICINE

## 2022-04-22 PROCEDURE — 99153 MOD SED SAME PHYS/QHP EA: CPT | Performed by: INTERNAL MEDICINE

## 2022-04-22 PROCEDURE — U0003 INFECTIOUS AGENT DETECTION BY NUCLEIC ACID (DNA OR RNA); SEVERE ACUTE RESPIRATORY SYNDROME CORONAVIRUS 2 (SARS-COV-2) (CORONAVIRUS DISEASE [COVID-19]), AMPLIFIED PROBE TECHNIQUE, MAKING USE OF HIGH THROUGHPUT TECHNOLOGIES AS DESCRIBED BY CMS-2020-01-R: HCPCS

## 2022-04-22 PROCEDURE — 93571 IV DOP VEL&/PRESS C FLO 1ST: CPT | Performed by: INTERNAL MEDICINE

## 2022-04-22 PROCEDURE — 85025 COMPLETE CBC W/AUTO DIFF WBC: CPT | Performed by: INTERNAL MEDICINE

## 2022-04-22 PROCEDURE — 85347 COAGULATION TIME ACTIVATED: CPT

## 2022-04-22 PROCEDURE — U0005 INFEC AGEN DETEC AMPLI PROBE: HCPCS

## 2022-04-22 PROCEDURE — 25010000002 MIDAZOLAM PER 1 MG: Performed by: INTERNAL MEDICINE

## 2022-04-22 PROCEDURE — C9803 HOPD COVID-19 SPEC COLLECT: HCPCS

## 2022-04-22 PROCEDURE — C1887 CATHETER, GUIDING: HCPCS | Performed by: INTERNAL MEDICINE

## 2022-04-22 RX ORDER — SODIUM CHLORIDE 9 MG/ML
20 INJECTION, SOLUTION INTRAVENOUS CONTINUOUS
Status: DISCONTINUED | OUTPATIENT
Start: 2022-04-22 | End: 2022-04-22 | Stop reason: HOSPADM

## 2022-04-22 RX ORDER — ACETAMINOPHEN 325 MG/1
650 TABLET ORAL EVERY 4 HOURS PRN
Status: DISCONTINUED | OUTPATIENT
Start: 2022-04-22 | End: 2022-04-22 | Stop reason: HOSPADM

## 2022-04-22 RX ORDER — LIDOCAINE HYDROCHLORIDE 20 MG/ML
INJECTION, SOLUTION INFILTRATION; PERINEURAL AS NEEDED
Status: DISCONTINUED | OUTPATIENT
Start: 2022-04-22 | End: 2022-04-22 | Stop reason: HOSPADM

## 2022-04-22 RX ORDER — SODIUM CHLORIDE 9 MG/ML
1 INJECTION, SOLUTION INTRAVENOUS CONTINUOUS
Status: DISCONTINUED | OUTPATIENT
Start: 2022-04-22 | End: 2022-04-22 | Stop reason: HOSPADM

## 2022-04-22 RX ORDER — HEPARIN SODIUM 1000 [USP'U]/ML
INJECTION, SOLUTION INTRAVENOUS; SUBCUTANEOUS AS NEEDED
Status: DISCONTINUED | OUTPATIENT
Start: 2022-04-22 | End: 2022-04-22 | Stop reason: HOSPADM

## 2022-04-22 RX ORDER — FENTANYL CITRATE 50 UG/ML
INJECTION, SOLUTION INTRAMUSCULAR; INTRAVENOUS AS NEEDED
Status: DISCONTINUED | OUTPATIENT
Start: 2022-04-22 | End: 2022-04-22 | Stop reason: HOSPADM

## 2022-04-22 RX ORDER — MIDAZOLAM HYDROCHLORIDE 1 MG/ML
INJECTION INTRAMUSCULAR; INTRAVENOUS AS NEEDED
Status: DISCONTINUED | OUTPATIENT
Start: 2022-04-22 | End: 2022-04-22 | Stop reason: HOSPADM

## 2022-04-22 RX ORDER — NITROGLYCERIN 5 MG/ML
INJECTION, SOLUTION INTRAVENOUS AS NEEDED
Status: DISCONTINUED | OUTPATIENT
Start: 2022-04-22 | End: 2022-04-22 | Stop reason: HOSPADM

## 2022-04-22 RX ORDER — NICARDIPINE HYDROCHLORIDE 2.5 MG/ML
INJECTION INTRAVENOUS AS NEEDED
Status: DISCONTINUED | OUTPATIENT
Start: 2022-04-22 | End: 2022-04-22 | Stop reason: HOSPADM

## 2022-04-22 RX ORDER — SODIUM CHLORIDE 9 MG/ML
INJECTION, SOLUTION INTRAVENOUS CONTINUOUS PRN
Status: COMPLETED | OUTPATIENT
Start: 2022-04-22 | End: 2022-04-22

## 2022-04-22 RX ORDER — DIPHENHYDRAMINE HYDROCHLORIDE 50 MG/ML
INJECTION INTRAMUSCULAR; INTRAVENOUS AS NEEDED
Status: DISCONTINUED | OUTPATIENT
Start: 2022-04-22 | End: 2022-04-22 | Stop reason: HOSPADM

## 2022-04-22 RX ADMIN — SODIUM CHLORIDE 20 ML/HR: 9 INJECTION, SOLUTION INTRAVENOUS at 07:50

## 2022-04-22 NOTE — INTERVAL H&P NOTE
H&P reviewed. The patient was examined and there are no changes to the H&P.        Patient seen and examined  Exam is unchanged    Abnormal stress test, consented for left heart catheterization for risk stratification  Proceed with left heart cath without other contraindication  Further recommendations to follow    Truong Ortiz MD, PhD

## 2022-04-22 NOTE — DISCHARGE INSTRUCTIONS
Post Cath Instructions      Call Dr. Ortiz’s office to schedule a follow up appointment in 2-4 weeks at [phone].  Specific Physician Instructions:     Drink plenty of fluids for the next 24 hours.  This helps to eliminate the dye used in your procedure through urination.  You may resume a normal diet; however, try to avoid foods that would cause gas or constipation.    Sedative medication given to you during your catheterization may decrease your judgement and reaction time for up to 24-48 hours.  Therefore:  DO NOT drive or operate hazardous machinery (48 hours)  DO NOT consume alcoholic beverages  DO NOT make any important/legal decisions  Have someone stay with you for at least 24 hours    To allow proper healing and prevent bleeding, the following activities are to be strictly avoided for the next 24-48 hours:  Excessive bending at wound site  Straining (anything that would tense up muscles around the affected puncture site)  Lifting objects greater than 5 pounds, pushing, or pulling for 5 days  For Arm Cases:  No flexing at the puncture site, such as hammering, golfing, bowling, or swinging any objects      Keep the puncture site clean and dry.  You may remove the dressing tomorrow and replace it with a band-aid for at least one additional day.  Gently clean the site with mild soap and water.  No scrubbing/rubbing and lightly pat the area dry.  Showers are acceptable; however, avoid submerging in water (tub baths, hot tubs, swimming pools, dishwater, etc…) for at least one week.  The site should be completely healed before resuming these activities to reduce the risk of infection.  Check the site often.  Watch for signs and symptoms of infection and notify your physician if any of the following occur:  Bleeding or an increase in swelling at the puncture site  Fever  Increased soreness around puncture site  Foul odor or significant drainage from the puncture site  Swelling, redness, or warmth at the  puncture site    **A bruise or small “pea sized” lump under the skin at the puncture site is not unusual.  This should disappear within 3-4 weeks.**  CONTACT YOUR PHYSICIAN OR CALL 911 IF YOU EXPERIENCE ANY OF THE FOLLOWING:  Increased angina (chest pain) or frequent sensations of pressure, burning, pain, or other discomfort in the chest, arm, jaws, or stomach  Lightheadedness, dizziness, faint feeling, sweating, or difficulty breathing  Odd sensation changes like numbness, tingling, coldness, or pain in the arm or leg in which the catheter was inserted  Limb in which the catheter was inserted becomes pale/bluish in color    IMPORTANT:  Although this occurs very rarely, if you should develop bright red or excessive bleeding, feel a “pop” inside at the insertion site, or notice a sudden increase in swelling larger than a walnut, you should call 911.  Hold continuous firm pressure to the access site until emergency personnel arrive.  It is best if someone else can do this for you.

## 2022-04-25 NOTE — PROGRESS NOTES
"Cardiology Clinic Note  Truong Ortiz MD, PhD    Subjective:     Encounter Date:04/20/2022      Patient ID: Dillon Aaron is a 66 y.o. male.    Chief Complaint:  Chief Complaint   Patient presents with   • Follow-up       HPI:    I the pleasure to see this very pleasant 66-year-old gentleman in clinic.  He is here for follow-up of an abnormal stress test.  Blood pressure 138/82 heart rates in the 60s.  He has history of left bundle branch block as well as essential hypertension and paroxysmal atrial fibrillation.  He was seen recently by nurse practitioner for perioperative risk assessment prior to left anterior total hip surgery, his exertional capacity was very limited secondary to his hip.  He was hospitalized for atrial fibrillation many years ago but unclear whether he had an ischemic evaluation at that time.  He is not terribly symptomatic with any anginal chest pain but again is not very exertional.  He does have occasional shortness of breath.  We did discuss his abnormal stress test demonstrating ischemia in the septum.  He is amenable for this approach and will need cardiac clearance prior to noncardiovascular surgery given abnormal stress test.      Review of systems negative for 14 point review of systems except as mentioned above    Historical data copied forward from previous encounters in EMR including the history, exam, and assessment/plan has been reviewed and is unchanged unless noted otherwise.    Cardiac medicines reviewed with risk, benefits, and necessity of each discussed.    Risk and benefit of cardiac testing including left heart catheterization reviewed including death heart attack stroke pain bleeding infection need for vascular /cardiovascular surgery were discussed and the patient     Objective:         /82 (BP Location: Left arm, Patient Position: Sitting)   Pulse 67   Resp 18   Ht 182.9 cm (72\")   Wt 114 kg (250 lb 9.6 oz)   BMI 33.99 kg/m²     Physical Exam  Regular rate and " rhythm no rubs murmurs gallops  No heave no lift  No clubbing cyanosis or edema  Clear to auscultation  Normocephalic atraumatic pupils equal round  Intact grossly  Soft nontender nondistended  Normal pulses normal cap refill  No carotid bruits or JVD    Assessment:         Diagnoses and all orders for this visit:    1. Abnormal stress test (Primary)  Overview:  Added automatically from request for surgery [id number]    Orders:  -     Case Request Cath Lab: Left Heart Cath  -     CBC & Differential  -     Comprehensive Metabolic Panel  -     Protime-INR    2. Pre-op testing  -     CBC & Differential  -     Comprehensive Metabolic Panel  -     Protime-INR  -     Cancel: COVID PRE-OP / PRE-PROCEDURE SCREENING ORDER (NO ISOLATION) - Swab, Nasopharynx; Future    3. Paroxysmal atrial fibrillation (HCC)  -     Protime-INR           Plan:      Atypical symptoms  Abnormal stress test  Perioperative evaluation  Essential hypertension    Left heart cath ordered    Further recommendation follow findings      The pleasure to be involved in this patient's cardiovascular care.  Please call with any questions or concerns  Truong Ortiz MD, PhD    Most recent EKG as reviewed and interpreted by me:  Procedures     Most recent echo as reviewed and interpreted by me:      Most recent stress test as reviewed and interpreted by me:  Results for orders placed during the hospital encounter of 04/19/22    Stress Test With Myocardial Perfusion (1 Day)    Interpretation Summary  · Left ventricular ejection fraction is borderline normal. (Calculated EF = 51%).  · Myocardial perfusion imaging indicates a located in the apex and septal wall.  · Resting images with anterior anteroseptal perfusion defect small to medium in size, moderate severity with summed rest score of 6, stress demonstrates worsening with moderate brad-infarct ischemia it would appear with now involvement of the inferoseptal wall, summed stress score of 15 with summed  difference score of 9 EF 51% .  · Findings consistent with an equivocal ECG stress test.    Abnormal study  Left bundle branch with uninterpretable stress ECG  Pharmacologic study  No arrhythmias seen  No ST changes seen  Submaximal stress  Nuclear imaging demonstrates at rest a perfusion defect in the anteroseptal wall small to medium in size moderate severity, stress induces worsening with at least moderate brad-infarct ischemia with additional involvement of the inferoseptal wall, summed difference score of 9, intermediate to high risk by criteria    Study concerning for ischemia in the septum and apex  EF 51%    Abnormal study      Most recent cardiac catheterization as reviewed interpreted by me:  No results found for this or any previous visit.    The following portions of the patient's history were reviewed and updated as appropriate: allergies, current medications, past family history, past medical history, past social history, past surgical history and problem list.      ROS:  14 point review of systems negative except as mentioned above    Current Outpatient Medications:   •  dilTIAZem (TIAZAC) 360 MG 24 hr capsule, TAKE 1 CAPSULE BY MOUTH EVERY DAY , Disp: 30 capsule, Rfl: 0  •  finasteride (PROSCAR) 5 MG tablet, Take 5 mg by mouth Daily., Disp: , Rfl:   •  meloxicam (MOBIC) 15 MG tablet, Take 15 mg by mouth Daily., Disp: , Rfl:   •  rosuvastatin (CRESTOR) 10 MG tablet, Daily., Disp: , Rfl:   •  vitamin D3 125 MCG (5000 UT) capsule capsule, Take 5,000 Units by mouth Daily., Disp: , Rfl:     Problem List:  Patient Active Problem List   Diagnosis   • Gastroenteritis, acute   • Mixed hyperlipidemia   • Essential hypertension   • Left bundle branch block   • Low back pain   • Obesity   • Paroxysmal atrial fibrillation (HCC)   • Abnormal stress test     Past Medical History:  Past Medical History:   Diagnosis Date   • Atrial fibrillation (HCC)    • Hyperlipidemia    • Hypertension      Past Surgical  History:  No past surgical history on file.  Social History:  Social History     Socioeconomic History   • Marital status:    Tobacco Use   • Smoking status: Never Smoker   • Smokeless tobacco: Never Used   Vaping Use   • Vaping Use: Never used   Substance and Sexual Activity   • Alcohol use: Yes     Alcohol/week: 6.0 standard drinks     Types: 6 Cans of beer per week     Comment: a year    • Drug use: Never   • Sexual activity: Defer     Allergies:  No Known Allergies  Immunizations:  Immunization History   Administered Date(s) Administered   • COVID-19 (MODERNA) 1st, 2nd, 3rd Dose Only 02/10/2021, 03/16/2021   • Flu Vaccine Quad PF 6-35MO 09/24/2016, 10/20/2019   • FluLaval/Fluarix/Fluzone >6 10/20/2019   • Fluad Quad 65+ 10/20/2020   • Hepatitis A 04/29/2018, 11/05/2018   • Influenza Quad Vaccine (Inpatient) 10/28/2017   • Influenza, Unspecified 01/08/2013, 11/24/2013, 10/24/2014, 11/05/2018   • Pneumococcal Conjugate 13-Valent (PCV13) 09/24/2020   • Zostavax 12/08/2016            In-Office Procedure(s):  No orders to display        ASCVD RIsk Score::  The ASCVD Risk score (Jono ANTON Jr., et al., 2013) failed to calculate for the following reasons:    Cannot find a previous HDL lab    Cannot find a previous total cholesterol lab    Imaging:    Results for orders placed in visit on 05/20/21    XR Humerus Left    Narrative  left humerus X-Ray  Indication: Postop ORIF humerus  AP and lateral  Findings: Healed fracture hardware in position  no bony lesion  Soft tissues normal  not examined joint spaces  Hardware appropriately positioned yes      yes prior studies available for comparison.    X-RAY was ordered and reviewed by Jarrell Deng MD               Lab Review:   Office Visit on 04/20/2022   Component Date Value   • Glucose 04/22/2022 121 (A)   • BUN 04/22/2022 19    • Creatinine 04/22/2022 1.14    • Sodium 04/22/2022 141    • Potassium 04/22/2022 4.3    • Chloride 04/22/2022 105    • CO2 04/22/2022  26.0    • Calcium 04/22/2022 9.6    • Total Protein 04/22/2022 6.9    • Albumin 04/22/2022 4.50    • ALT (SGPT) 04/22/2022 26    • AST (SGOT) 04/22/2022 17    • Alkaline Phosphatase 04/22/2022 68    • Total Bilirubin 04/22/2022 0.7    • Globulin 04/22/2022 2.4    • A/G Ratio 04/22/2022 1.9    • BUN/Creatinine Ratio 04/22/2022 16.7    • Anion Gap 04/22/2022 10.0    • eGFR 04/22/2022 70.5    • Protime 04/22/2022 10.3    • INR 04/22/2022 1.00    • WBC 04/22/2022 5.90    • RBC 04/22/2022 4.54    • Hemoglobin 04/22/2022 14.4    • Hematocrit 04/22/2022 42.7    • MCV 04/22/2022 94.0    • MCH 04/22/2022 31.8    • MCHC 04/22/2022 33.8    • RDW 04/22/2022 13.5    • RDW-SD 04/22/2022 44.6    • MPV 04/22/2022 7.3    • Platelets 04/22/2022 210    • Neutrophil % 04/22/2022 48.5    • Lymphocyte % 04/22/2022 43.3    • Monocyte % 04/22/2022 6.7    • Eosinophil % 04/22/2022 1.0    • Basophil % 04/22/2022 0.5    • Neutrophils, Absolute 04/22/2022 2.90    • Lymphocytes, Absolute 04/22/2022 2.60    • Monocytes, Absolute 04/22/2022 0.40    • Eosinophils, Absolute 04/22/2022 0.10    • Basophils, Absolute 04/22/2022 0.00    • nRBC 04/22/2022 0.1    Hospital Outpatient Visit on 04/19/2022   Component Date Value   • Target HR (85%) 04/19/2022 130    • Max. Pred. HR (100%) 04/19/2022 153    • Nuclear Prior Study 04/19/2022 3    •  CV REST NUCLEAR ISOTO* 04/19/2022 7.3    • BH CV STRESS NUCLEAR ISO* 04/19/2022 21.9    • Nuc Stress EF 04/19/2022 51    Hospital Outpatient Visit on 04/11/2022   Component Date Value   • QT Interval 04/11/2022 442    Lab on 04/11/2022   Component Date Value   • Glucose 04/11/2022 112 (A)   • BUN 04/11/2022 20    • Creatinine 04/11/2022 1.02    • Sodium 04/11/2022 140    • Potassium 04/11/2022 4.0    • Chloride 04/11/2022 104    • CO2 04/11/2022 26.5    • Calcium 04/11/2022 9.8    • BUN/Creatinine Ratio 04/11/2022 19.6    • Anion Gap 04/11/2022 9.5    • eGFR 04/11/2022 80.6    • Albumin 04/11/2022 4.90    •  Hemoglobin A1C 04/11/2022 6.0 (A)   • WBC 04/11/2022 6.46    • RBC 04/11/2022 4.53    • Hemoglobin 04/11/2022 14.8    • Hematocrit 04/11/2022 43.1    • MCV 04/11/2022 95.1    • MCH 04/11/2022 32.7    • MCHC 04/11/2022 34.3    • RDW 04/11/2022 12.9    • RDW-SD 04/11/2022 44.3    • MPV 04/11/2022 10.0    • Platelets 04/11/2022 237    • Neutrophil % 04/11/2022 55.3    • Lymphocyte % 04/11/2022 36.2    • Monocyte % 04/11/2022 7.4    • Eosinophil % 04/11/2022 0.5    • Basophil % 04/11/2022 0.3    • Immature Grans % 04/11/2022 0.3    • Neutrophils, Absolute 04/11/2022 3.57    • Lymphocytes, Absolute 04/11/2022 2.34    • Monocytes, Absolute 04/11/2022 0.48    • Eosinophils, Absolute 04/11/2022 0.03    • Basophils, Absolute 04/11/2022 0.02    • Immature Grans, Absolute 04/11/2022 0.02    • nRBC 04/11/2022 0.0    • MRSA PCR 04/11/2022 No MRSA Detected      Recent labs reviewed and interpreted for clinical significance and application            Level of Care:           Truong Ortiz MD  04/25/22  .

## 2022-05-09 RX ORDER — DILTIAZEM HYDROCHLORIDE 360 MG/1
360 CAPSULE, EXTENDED RELEASE ORAL DAILY
Qty: 90 CAPSULE | Refills: 1 | Status: SHIPPED | OUTPATIENT
Start: 2022-05-09 | End: 2022-11-21

## 2022-06-01 ENCOUNTER — OFFICE VISIT (OUTPATIENT)
Dept: CARDIOLOGY | Facility: CLINIC | Age: 67
End: 2022-06-01

## 2022-06-01 VITALS
HEIGHT: 72 IN | HEART RATE: 62 BPM | DIASTOLIC BLOOD PRESSURE: 82 MMHG | SYSTOLIC BLOOD PRESSURE: 140 MMHG | RESPIRATION RATE: 18 BRPM | WEIGHT: 238 LBS | BODY MASS INDEX: 32.23 KG/M2

## 2022-06-01 DIAGNOSIS — I48.0 PAROXYSMAL ATRIAL FIBRILLATION: ICD-10-CM

## 2022-06-01 DIAGNOSIS — Z01.810 PRE-OPERATIVE CARDIOVASCULAR EXAMINATION: ICD-10-CM

## 2022-06-01 DIAGNOSIS — E78.2 MIXED HYPERLIPIDEMIA: ICD-10-CM

## 2022-06-01 DIAGNOSIS — R94.39 ABNORMAL STRESS TEST: Primary | ICD-10-CM

## 2022-06-01 DIAGNOSIS — R06.09 DYSPNEA ON EXERTION: ICD-10-CM

## 2022-06-01 PROCEDURE — 99214 OFFICE O/P EST MOD 30 MIN: CPT | Performed by: INTERNAL MEDICINE

## 2022-06-01 RX ORDER — ASPIRIN 81 MG/1
81 TABLET, CHEWABLE ORAL DAILY
COMMUNITY

## 2022-06-01 RX ORDER — ROSUVASTATIN CALCIUM 20 MG/1
20 TABLET, COATED ORAL DAILY
Qty: 90 TABLET | Refills: 1 | Status: SHIPPED | OUTPATIENT
Start: 2022-06-01 | End: 2022-12-27

## 2022-06-01 NOTE — PROGRESS NOTES
Cardiology Clinic Note  Truong Ortiz MD, PhD    Subjective:     Encounter Date:06/01/2022      Patient ID: Dillon Aaron is a 66 y.o. male.    Chief Complaint:  Chief Complaint   Patient presents with   • Follow-up       HPI:    Previously I had the pleasure to see this very pleasant 66-year-old gentleman in clinic.  He is here for follow-up of an abnormal stress test.  Blood pressure 138/82 heart rates in the 60s.  He has history of left bundle branch block as well as essential hypertension and paroxysmal atrial fibrillation.  He was seen recently by nurse practitioner for perioperative risk assessment prior to left anterior total hip surgery, his exertional capacity was very limited secondary to his hip.  He was hospitalized for atrial fibrillation many years ago but unclear whether he had an ischemic evaluation at that time.  He is not terribly symptomatic with any anginal chest pain but again is not very exertional.  He does have occasional shortness of breath.  We did discuss his abnormal stress test demonstrating ischemia in the septum.  He is amenable for this approach and will need cardiac clearance prior to noncardiovascular surgery given abnormal stress test.  He underwent invasive ischemic evaluation demonstrating 60% mid LAD disease compromising ostial proximal diagonal and the continuation LAD, FFR revealed insignificant hemodynamic status with 0.91 value, this is been managed medically, he is chest pain-free, we did discuss risk factor reduction with increasing cholesterol reduction, diet exercise weight loss, heart healthy diet consisting really Mediterranean diet.  He has preserved EF no heart failure signs or symptoms.        Review of systems negative for 14 point review of systems except as mentioned above     Historical data copied forward from previous encounters in EMR including the history, exam, and assessment/plan has been reviewed and is unchanged unless noted otherwise.     Cardiac  medicines reviewed with risk, benefits, and necessity of each discussed.     Risk and benefit of cardiac testing including left heart catheterization reviewed including death heart attack stroke pain bleeding infection need for vascular /cardiovascular surgery were discussed and the patient      Objective:         Objective          Reviewed below     Physical Exam  Regular rate and rhythm no rubs murmurs gallops  No heave no lift  No clubbing cyanosis or edema  Clear to auscultation  Normocephalic atraumatic pupils equal round  Intact grossly  Soft nontender nondistended  Normal pulses normal cap refill  No carotid bruits or JVD  Unchanged from prior encounter  Assessment:         Assessment          Diagnoses and all orders for this visit:     1. Abnormal stress test (Primary)  FFR the LAD is 0.91  Nonobstructive disease in the circumflex and RCA  60% mid LAD disease  Preserved EF    3. Paroxysmal atrial fibrillation (HCC)  -    No anticoagulation  Daily aspirin  Diltiazem on board    Hyperlipidemia and nonobstructive CAD, increase Crestor to 20 daily     Perioperative evaluation  Nonobstructive CAD  No contraindication to any noncardiovascular surgery, does not need further ischemic evaluation at this time  Preserved EF  No heart failure  Nonobstructive CAD          See him back in 6 months     The pleasure to be involved in this patient's cardiovascular care.  Please call with any questions or concerns  Truong Ortiz MD, PhD      Historical data copied forward from previous encounters in EMR including the history, exam, and assessment/plan has been reviewed and is unchanged unless noted otherwise.    Cardiac medicines reviewed with risk, benefits, and necessity of each discussed.    Risk and benefit of cardiac testing reviewed including death heart attack stroke pain bleeding infection need for vascular /cardiovascular surgery were discussed and the patient     Objective:         /82 (BP Location: Left  "arm, Patient Position: Sitting)   Pulse 62   Resp 18   Ht 182.9 cm (72\")   Wt 108 kg (238 lb)   BMI 32.28 kg/m²     Physical Exam    Assessment:         There are no diagnoses linked to this encounter.       Plan:              The pleasure to be involved in this patient's cardiovascular care.  Please call with any questions or concerns  Truong Ortiz MD, PhD    Most recent EKG as reviewed and interpreted by me:  Procedures     Most recent echo as reviewed and interpreted by me:      Most recent stress test as reviewed and interpreted by me:  Results for orders placed during the hospital encounter of 04/19/22    Stress Test With Myocardial Perfusion (1 Day)    Interpretation Summary  · Left ventricular ejection fraction is borderline normal. (Calculated EF = 51%).  · Myocardial perfusion imaging indicates a located in the apex and septal wall.  · Resting images with anterior anteroseptal perfusion defect small to medium in size, moderate severity with summed rest score of 6, stress demonstrates worsening with moderate brad-infarct ischemia it would appear with now involvement of the inferoseptal wall, summed stress score of 15 with summed difference score of 9 EF 51% .  · Findings consistent with an equivocal ECG stress test.    Abnormal study  Left bundle branch with uninterpretable stress ECG  Pharmacologic study  No arrhythmias seen  No ST changes seen  Submaximal stress  Nuclear imaging demonstrates at rest a perfusion defect in the anteroseptal wall small to medium in size moderate severity, stress induces worsening with at least moderate brad-infarct ischemia with additional involvement of the inferoseptal wall, summed difference score of 9, intermediate to high risk by criteria    Study concerning for ischemia in the septum and apex  EF 51%    Abnormal study      Most recent cardiac catheterization as reviewed interpreted by me:  Results for orders placed during the hospital encounter of " 04/22/22    Cardiac Catheterization/Vascular Study    Narrative   Truong Ortiz MD, PhD  [facility] cardiology  Date of service 4-    Procedure  1.  Left heart catheterization with coronary angiography left ventriculography in MENESES position  2 FFR of the mid LAD with peripherally administered adenosine secondary to 60% bifurcation disease and abnormal stress test    Indication  Abnormal stress test  Dyspnea on exertion    After informed consent patient was brought to Cath Lab sterilely prepped and draped in usual fashion with exposure of the right wrist for right radial access via micropuncture modified surgery technique with placement of a 5/6 slender sheath, standard cocktail of heparin nitroglycerin and Cardene was administered via the sheath followed by 035 guidewire to the aortic valve followed by JL 3 for left coronary angiography, JR4 for right coronary angiography, JR4 was used across aortic valve followed by hand-injection for LV gram, EDP assessment and pullback assessment of the transaortic valve gradient.  Secondary to moderate disease of the mid LAD at the bifurcation with acute angle takeoff of the diagonal branch and abnormal stress test decision was made for hemodynamic assessment.  Patient was heparinized with 100 units/kg of heparin for ACT greater than 250.  A 6 Moroccan EBU 3.5 guide was used engage left main followed by fractional flow reserve via standard technique with equalization of the left main and peripherally administered adenosine at 140 mcg/kg/min, minimum FFR value of 0.91 which was insignificant hemodynamically despite 60% bifurcation disease but in a very large caliber portion of the vessel.  Pullback revealed no significant drift and then all catheters and equipment removed.  Patient had no complications and left the Cath Lab chest pain-free hemodynamically electrically stable alert talking to staff neurologically grossly intact bilaterally with TR  band    Findings  1.  Opening aortic pressure 134/84  2.  Closing pressure was unchanged  3.  LVEDP 12-14  4.  Normal LV systolic function 60%  5.  No transaortic valve gradient seen on pullback    Complications none  Blood loss less than 5 cc  Contrast 95 cc  Moderate conscious sedation time of 45 minutes with IV Versed and fentanyl administered by registered nurse with complete ECG pulse oximetry and hemodynamic monitoring throughout the entirety the case observed by me    Angiography  1 left main large-caliber vessel no disease  2.  LAD is a large-caliber vessel coursing to and around the apex with a large diagonal branch in the midportion, the proximal diagonal branch is essentially a ramus intermedius, proximal LAD diffusely medical irregularities 10% maximally, there is a mid to distal takeoff of a sizable diagonal branch at least 3 mm in diameter with acute angle takeoff, this bifurcation has 50 to 60% stenosis involving lara 1/1/1 classification with inflow LAD as well as both continuation LAD and ostial diagonal locations by definition.  Distally there is only luminal irregularities less than 10%  3.  Circumflex is a large-caliber codominant vessel with large PLV branch, there is no angiographic disease of any significance with only mild luminal irregularities less than 10%, obtuse marginal branch is a least 4 mm in diameter and trifurcates along the lateral wall  4.  RCA is a large-caliber codominant vessel, there is diffuse luminal regularities throughout 20% with PDA that is widely patent but there is slow flow indicative of possibly some microvascular dysfunction.  There is no obstructive CAD    Conclusions and recommendations  1.  Diffuse luminary  irregularities throughout all coronaries most severe in the RCA, moderate disease in the mid LAD 50 to 60% at the bifurcation of the diagonal branch in the midportion, negative FFR at 0.91 not hemodynamically significant and can be treated medically  2.   Preserved LVEF, normal transaortic valve gradient, normal LVEDP    Secondary prevention goals for CAD, diet exercise cholesterol reduction to try to minimize and reduce plaque burden    Patient be discharged once discharge criteria met today in follow-up as outpatient    Truong Ortiz MD, PhD    The following portions of the patient's history were reviewed and updated as appropriate: allergies, current medications, past family history, past medical history, past social history, past surgical history and problem list.      ROS:  14 point review of systems negative except as mentioned above    Current Outpatient Medications:   •  aspirin 81 MG chewable tablet, Chew 81 mg Daily., Disp: , Rfl:   •  dilTIAZem (TIAZAC) 360 MG 24 hr capsule, Take 1 capsule by mouth Daily., Disp: 90 capsule, Rfl: 1  •  finasteride (PROSCAR) 5 MG tablet, Take 5 mg by mouth Daily., Disp: , Rfl:   •  meloxicam (MOBIC) 15 MG tablet, Take 15 mg by mouth Daily., Disp: , Rfl:   •  vitamin D3 125 MCG (5000 UT) capsule capsule, Take 5,000 Units by mouth Daily., Disp: , Rfl:   •  rosuvastatin (CRESTOR) 20 MG tablet, Take 1 tablet by mouth Daily., Disp: 90 tablet, Rfl: 1    Problem List:  Patient Active Problem List   Diagnosis   • Gastroenteritis, acute   • Mixed hyperlipidemia   • Essential hypertension   • Left bundle branch block   • Low back pain   • Obesity   • Paroxysmal atrial fibrillation (HCC)   • Abnormal stress test     Past Medical History:  Past Medical History:   Diagnosis Date   • Atrial fibrillation (HCC)    • Hyperlipidemia    • Hypertension      Past Surgical History:  Past Surgical History:   Procedure Laterality Date   • CARDIAC CATHETERIZATION N/A 4/22/2022    Procedure: Left Heart Cath;  Surgeon: Truong Ortiz MD;  Location: [facility] CATH INVASIVE LOCATION;  Service: Cardiology;  Laterality: N/A;     Social History:  Social History     Socioeconomic History   • Marital status:    Tobacco Use   • Smoking status:  Never Smoker   • Smokeless tobacco: Never Used   Vaping Use   • Vaping Use: Never used   Substance and Sexual Activity   • Alcohol use: Yes     Alcohol/week: 6.0 standard drinks     Types: 6 Cans of beer per week     Comment: a year    • Drug use: Never   • Sexual activity: Defer     Allergies:  No Known Allergies  Immunizations:  Immunization History   Administered Date(s) Administered   • COVID-19 (MODERNA) 1st, 2nd, 3rd Dose Only 02/10/2021, 03/16/2021   • Flu Vaccine Quad PF 6-35MO 09/24/2016, 10/20/2019   • FluLaval/Fluarix/Fluzone >6 10/20/2019   • Fluad Quad 65+ 10/20/2020   • Hepatitis A 04/29/2018, 11/05/2018   • Influenza Quad Vaccine (Inpatient) 10/28/2017   • Influenza, Unspecified 01/08/2013, 11/24/2013, 10/24/2014, 11/05/2018   • Pneumococcal Conjugate 13-Valent (PCV13) 09/24/2020   • Zostavax 12/08/2016            In-Office Procedure(s):  No orders to display        ASCVD RIsk Score::  The ASCVD Risk score (Freedom ANTON Jr., et al., 2013) failed to calculate for the following reasons:    Cannot find a previous HDL lab    Cannot find a previous total cholesterol lab    Imaging:    Results for orders placed in visit on 05/20/21    XR Humerus Left    Narrative  left humerus X-Ray  Indication: Postop ORIF humerus  AP and lateral  Findings: Healed fracture hardware in position  no bony lesion  Soft tissues normal  not examined joint spaces  Hardware appropriately positioned yes      yes prior studies available for comparison.    X-RAY was ordered and reviewed by Jarrell Deng MD               Lab Review:   Admission on 04/22/2022, Discharged on 04/22/2022   Component Date Value   • Activated Clotting Time  04/22/2022 249 (A)   Lab on 04/22/2022   Component Date Value   • COVID19 04/22/2022 Not Detected    Office Visit on 04/20/2022   Component Date Value   • Glucose 04/22/2022 121 (A)   • BUN 04/22/2022 19    • Creatinine 04/22/2022 1.14    • Sodium 04/22/2022 141    • Potassium 04/22/2022 4.3    •  Chloride 04/22/2022 105    • CO2 04/22/2022 26.0    • Calcium 04/22/2022 9.6    • Total Protein 04/22/2022 6.9    • Albumin 04/22/2022 4.50    • ALT (SGPT) 04/22/2022 26    • AST (SGOT) 04/22/2022 17    • Alkaline Phosphatase 04/22/2022 68    • Total Bilirubin 04/22/2022 0.7    • Globulin 04/22/2022 2.4    • A/G Ratio 04/22/2022 1.9    • BUN/Creatinine Ratio 04/22/2022 16.7    • Anion Gap 04/22/2022 10.0    • eGFR 04/22/2022 70.5    • Protime 04/22/2022 10.3    • INR 04/22/2022 1.00    • WBC 04/22/2022 5.90    • RBC 04/22/2022 4.54    • Hemoglobin 04/22/2022 14.4    • Hematocrit 04/22/2022 42.7    • MCV 04/22/2022 94.0    • MCH 04/22/2022 31.8    • MCHC 04/22/2022 33.8    • RDW 04/22/2022 13.5    • RDW-SD 04/22/2022 44.6    • MPV 04/22/2022 7.3    • Platelets 04/22/2022 210    • Neutrophil % 04/22/2022 48.5    • Lymphocyte % 04/22/2022 43.3    • Monocyte % 04/22/2022 6.7    • Eosinophil % 04/22/2022 1.0    • Basophil % 04/22/2022 0.5    • Neutrophils, Absolute 04/22/2022 2.90    • Lymphocytes, Absolute 04/22/2022 2.60    • Monocytes, Absolute 04/22/2022 0.40    • Eosinophils, Absolute 04/22/2022 0.10    • Basophils, Absolute 04/22/2022 0.00    • nRBC 04/22/2022 0.1    Hospital Outpatient Visit on 04/19/2022   Component Date Value   • Target HR (85%) 04/19/2022 130    • Max. Pred. HR (100%) 04/19/2022 153    • Nuclear Prior Study 04/19/2022 3    • BH CV REST NUCLEAR ISOTO* 04/19/2022 7.3    • BH CV STRESS NUCLEAR ISO* 04/19/2022 21.9    • Nuc Stress EF 04/19/2022 51    Hospital Outpatient Visit on 04/11/2022   Component Date Value   • QT Interval 04/11/2022 442    Lab on 04/11/2022   Component Date Value   • Glucose 04/11/2022 112 (A)   • BUN 04/11/2022 20    • Creatinine 04/11/2022 1.02    • Sodium 04/11/2022 140    • Potassium 04/11/2022 4.0    • Chloride 04/11/2022 104    • CO2 04/11/2022 26.5    • Calcium 04/11/2022 9.8    • BUN/Creatinine Ratio 04/11/2022 19.6    • Anion Gap 04/11/2022 9.5    • eGFR 04/11/2022  80.6    • Albumin 04/11/2022 4.90    • Hemoglobin A1C 04/11/2022 6.0 (A)   • WBC 04/11/2022 6.46    • RBC 04/11/2022 4.53    • Hemoglobin 04/11/2022 14.8    • Hematocrit 04/11/2022 43.1    • MCV 04/11/2022 95.1    • MCH 04/11/2022 32.7    • MCHC 04/11/2022 34.3    • RDW 04/11/2022 12.9    • RDW-SD 04/11/2022 44.3    • MPV 04/11/2022 10.0    • Platelets 04/11/2022 237    • Neutrophil % 04/11/2022 55.3    • Lymphocyte % 04/11/2022 36.2    • Monocyte % 04/11/2022 7.4    • Eosinophil % 04/11/2022 0.5    • Basophil % 04/11/2022 0.3    • Immature Grans % 04/11/2022 0.3    • Neutrophils, Absolute 04/11/2022 3.57    • Lymphocytes, Absolute 04/11/2022 2.34    • Monocytes, Absolute 04/11/2022 0.48    • Eosinophils, Absolute 04/11/2022 0.03    • Basophils, Absolute 04/11/2022 0.02    • Immature Grans, Absolute 04/11/2022 0.02    • nRBC 04/11/2022 0.0    • MRSA PCR 04/11/2022 No MRSA Detected      Recent labs reviewed and interpreted for clinical significance and application            Level of Care:           Truong Ortiz MD  06/01/22  .

## 2022-10-14 ENCOUNTER — OFFICE VISIT (OUTPATIENT)
Dept: CARDIOLOGY | Facility: CLINIC | Age: 67
End: 2022-10-14

## 2022-10-14 VITALS
HEIGHT: 72 IN | DIASTOLIC BLOOD PRESSURE: 70 MMHG | BODY MASS INDEX: 31.15 KG/M2 | HEART RATE: 50 BPM | SYSTOLIC BLOOD PRESSURE: 123 MMHG | OXYGEN SATURATION: 97 % | WEIGHT: 230 LBS

## 2022-10-14 DIAGNOSIS — Z01.810 PREOPERATIVE CARDIOVASCULAR EXAMINATION: Primary | ICD-10-CM

## 2022-10-14 PROCEDURE — 93000 ELECTROCARDIOGRAM COMPLETE: CPT | Performed by: INTERNAL MEDICINE

## 2022-10-14 PROCEDURE — 99214 OFFICE O/P EST MOD 30 MIN: CPT | Performed by: INTERNAL MEDICINE

## 2022-10-14 RX ORDER — GABAPENTIN 600 MG/1
600 TABLET ORAL 2 TIMES DAILY
COMMUNITY
Start: 2022-07-12 | End: 2022-10-14

## 2022-10-14 NOTE — PROGRESS NOTES
Cardiology Clinic Note  Truong Ortiz MD, PhD    Subjective:     Encounter Date:10/14/2022      Patient ID: Dillon Aaron is a 66 y.o. male.    Chief Complaint:  Chief Complaint   Patient presents with   • Atrial Fibrillation   • Hypertension   • Hyperlipidemia   • Follow-up       HPI:      Previously I had the pleasure to see this very pleasant 66-year-old gentleman in clinic.  He is here for follow-up of an abnormal stress test.  Blood pressure 138/82 heart rates in the 60s.  He has history of left bundle branch block as well as essential hypertension and paroxysmal atrial fibrillation.  He was seen recently by nurse practitioner for perioperative risk assessment prior to left anterior total hip surgery, his exertional capacity was very limited secondary to his hip.  He was hospitalized for atrial fibrillation many years ago but unclear whether he had an ischemic evaluation at that time.  He is not terribly symptomatic with any anginal chest pain but again is not very exertional.  He does have occasional shortness of breath.  We did discuss his previous abnormal stress test demonstrating ischemia in the septum.  .  He underwent invasive ischemic evaluation demonstrating 60% mid LAD disease compromising ostial proximal diagonal and the continuation LAD, FFR revealed insignificant hemodynamic status with 0.91 value, this is been managed medically, he is chest pain-free, we did discuss risk factor reduction with increasing cholesterol reduction, diet exercise weight loss, heart healthy diet consisting really.     He presents back preoperatively needing the other hip operated on given significant pain and discomfort with ambulation.  He continues to have no chest pain, normal EF, well compensated euvolemic with no heart failure.  He continues to have sinus bradycardia which is been unchanged with underlying left bundle branch block which again is unchanged.  He does have significant somnolence and fatigue which may be  related to his heart rate however he has obstructive sleep apnea and this has been chronic and is again unchanged.  We did discuss need for possible pacemaker in the future if he has progression of his conduction disease or symptomatic bradycardia can be attributed to any feelings of presyncope dizziness fatigue listlessness or he has chronotropic incompetence in the future.  He is poorly exertional given his musculoskeletal issues and would not tolerate a treadmill for assessment of chronotropic incompetence today he says.  We discussed the cardiovascular restratification really proceeds and pertains to perioperative cardiac event risk with MI and he does not need ischemic evaluation prior to noncardiovascular surgery noting prior invasive findings, lack of symptoms, normal EF and lack of decreasing perioperative risk with coronary intervention without symptoms, heart failure, and would unduly delay any surgery      Diet exercise per AHA guidelines recommended  Mediterranean diet low-cholesterol       Review of systems negative for 14 point review of systems except as mentioned above     Historical data copied forward from previous encounters in EMR including the history, exam, and assessment/plan has been reviewed and is unchanged unless noted otherwise.     Cardiac medicines reviewed with risk, benefits, and necessity of each discussed.     Risk and benefit of cardiac testing including left heart catheterization reviewed including death heart attack stroke pain bleeding infection need for vascular /cardiovascular surgery were discussed and the patient      Objective:         Objective          Reviewed below     Physical Exam  Regular rate and rhythm no rubs murmurs gallops  No heave no lift  No clubbing cyanosis or edema  Clear to auscultation  Normocephalic atraumatic pupils equal round  Intact grossly  Soft nontender nondistended  Normal pulses normal cap refill  No carotid bruits or JVD  Unchanged from prior  "encounter  Assessment:         Assessment          Diagnoses and all orders for this visit:     1. Abnormal stress test (Primary)  FFR the LAD is 0.91  Nonobstructive disease in the circumflex and RCA  60% mid LAD disease with HONORIO-3 flow  Preserved EF  No new symptoms of any angina shortness of breath dyspnea exertion heart failure orthopnea PND or other complaints     3. Paroxysmal atrial fibrillation (HCC)  Maintaining sinus bradycardia, has had no recurrence thus far there has been documented  No anticoagulation warranted  Daily aspirin  Diltiazem on board  Off beta-blockers with sinus bradycardia     Hyperlipidemia and nonobstructive CAD, continue high intensity statin Crestor to 20 daily     Perioperative evaluation, no need for ischemic evaluation prior to noncardiovascular surgery with prior findings and invasive work-up this year in April 2022 with nonobstructive CAD  No new symptoms  No heart failure  Avoid perioperative hypotension  No pacemakers presently indicated without symptoms with stable heart rates in the 50s to 60s    No contraindication to any noncardiovascular surgery, does not need further ischemic evaluation at this time             See him back in 6 months     The pleasure to be involved in this patient's cardiovascular care.  Please call with any questions or concerns  Truong Ortiz MD, PhD       Historical data copied forward from previous encounters in EMR including the history, exam, and assessment/plan has been reviewed and is unchanged unless noted otherwise.    Cardiac medicines reviewed with risk, benefits, and necessity of each discussed.    Risk and benefit of cardiac testing reviewed including death heart attack stroke pain bleeding infection need for vascular /cardiovascular surgery were discussed and the patient     Objective:         /70   Pulse 50   Ht 182.9 cm (72.01\")   Wt 104 kg (230 lb)   SpO2 97%   BMI 31.19 kg/m²         The pleasure to be involved in this " patient's cardiovascular care.  Please call with any questions or concerns  Truong Ortiz MD, PhD    Most recent EKG as reviewed and interpreted by me:    ECG 12 Lead    Date/Time: 10/14/2022 1:35 PM  Performed by: Truong Ortiz MD  Authorized by: Truong Ortiz MD   Rhythm: sinus bradycardia  Rate: bradycardic  Conduction: left bundle branch block    Clinical impression: abnormal EKG             Most recent echo as reviewed and interpreted by me:      Most recent stress test as reviewed and interpreted by me:  Results for orders placed during the hospital encounter of 04/19/22    Stress Test With Myocardial Perfusion (1 Day)    Interpretation Summary  · Left ventricular ejection fraction is borderline normal. (Calculated EF = 51%).  · Myocardial perfusion imaging indicates a located in the apex and septal wall.  · Resting images with anterior anteroseptal perfusion defect small to medium in size, moderate severity with summed rest score of 6, stress demonstrates worsening with moderate brad-infarct ischemia it would appear with now involvement of the inferoseptal wall, summed stress score of 15 with summed difference score of 9 EF 51% .  · Findings consistent with an equivocal ECG stress test.    Abnormal study  Left bundle branch with uninterpretable stress ECG  Pharmacologic study  No arrhythmias seen  No ST changes seen  Submaximal stress  Nuclear imaging demonstrates at rest a perfusion defect in the anteroseptal wall small to medium in size moderate severity, stress induces worsening with at least moderate brad-infarct ischemia with additional involvement of the inferoseptal wall, summed difference score of 9, intermediate to high risk by criteria    Study concerning for ischemia in the septum and apex  EF 51%    Abnormal study      Most recent cardiac catheterization as reviewed interpreted by me:  Results for orders placed during the hospital encounter of 04/22/22    Cardiac  Catheterization/Vascular Study    Narrative   Truong Ortiz MD, PhD  [facility] cardiology  Date of service 4-    Procedure  1.  Left heart catheterization with coronary angiography left ventriculography in MENESES position  2 FFR of the mid LAD with peripherally administered adenosine secondary to 60% bifurcation disease and abnormal stress test    Indication  Abnormal stress test  Dyspnea on exertion    After informed consent patient was brought to Cath Lab sterilely prepped and draped in usual fashion with exposure of the right wrist for right radial access via micropuncture modified surgery technique with placement of a 5/6 slender sheath, standard cocktail of heparin nitroglycerin and Cardene was administered via the sheath followed by 035 guidewire to the aortic valve followed by JL 3 for left coronary angiography, JR4 for right coronary angiography, JR4 was used across aortic valve followed by hand-injection for LV gram, EDP assessment and pullback assessment of the transaortic valve gradient.  Secondary to moderate disease of the mid LAD at the bifurcation with acute angle takeoff of the diagonal branch and abnormal stress test decision was made for hemodynamic assessment.  Patient was heparinized with 100 units/kg of heparin for ACT greater than 250.  A 6 Tajik EBU 3.5 guide was used engage left main followed by fractional flow reserve via standard technique with equalization of the left main and peripherally administered adenosine at 140 mcg/kg/min, minimum FFR value of 0.91 which was insignificant hemodynamically despite 60% bifurcation disease but in a very large caliber portion of the vessel.  Pullback revealed no significant drift and then all catheters and equipment removed.  Patient had no complications and left the Cath Lab chest pain-free hemodynamically electrically stable alert talking to staff neurologically grossly intact bilaterally with TR band    Findings  1.   Opening aortic pressure 134/84  2.  Closing pressure was unchanged  3.  LVEDP 12-14  4.  Normal LV systolic function 60%  5.  No transaortic valve gradient seen on pullback    Complications none  Blood loss less than 5 cc  Contrast 95 cc  Moderate conscious sedation time of 45 minutes with IV Versed and fentanyl administered by registered nurse with complete ECG pulse oximetry and hemodynamic monitoring throughout the entirety the case observed by me    Angiography  1 left main large-caliber vessel no disease  2.  LAD is a large-caliber vessel coursing to and around the apex with a large diagonal branch in the midportion, the proximal diagonal branch is essentially a ramus intermedius, proximal LAD diffusely medical irregularities 10% maximally, there is a mid to distal takeoff of a sizable diagonal branch at least 3 mm in diameter with acute angle takeoff, this bifurcation has 50 to 60% stenosis involving lara 1/1/1 classification with inflow LAD as well as both continuation LAD and ostial diagonal locations by definition.  Distally there is only luminal irregularities less than 10%  3.  Circumflex is a large-caliber codominant vessel with large PLV branch, there is no angiographic disease of any significance with only mild luminal irregularities less than 10%, obtuse marginal branch is a least 4 mm in diameter and trifurcates along the lateral wall  4.  RCA is a large-caliber codominant vessel, there is diffuse luminal regularities throughout 20% with PDA that is widely patent but there is slow flow indicative of possibly some microvascular dysfunction.  There is no obstructive CAD    Conclusions and recommendations  1.  Diffuse luminary  irregularities throughout all coronaries most severe in the RCA, moderate disease in the mid LAD 50 to 60% at the bifurcation of the diagonal branch in the midportion, negative FFR at 0.91 not hemodynamically significant and can be treated medically  2.  Preserved LVEF, normal  transaortic valve gradient, normal LVEDP    Secondary prevention goals for CAD, diet exercise cholesterol reduction to try to minimize and reduce plaque burden    Patient be discharged once discharge criteria met today in follow-up as outpatient    Truong Ortiz MD, PhD    The following portions of the patient's history were reviewed and updated as appropriate: allergies, current medications, past family history, past medical history, past social history, past surgical history and problem list.      ROS:  14 point review of systems negative except as mentioned above    Current Outpatient Medications:   •  aspirin 81 MG chewable tablet, Chew 81 mg Daily., Disp: , Rfl:   •  dilTIAZem (TIAZAC) 360 MG 24 hr capsule, Take 1 capsule by mouth Daily., Disp: 90 capsule, Rfl: 1  •  finasteride (PROSCAR) 5 MG tablet, Take 5 mg by mouth Daily., Disp: , Rfl:   •  meloxicam (MOBIC) 15 MG tablet, Take 15 mg by mouth Daily., Disp: , Rfl:   •  rosuvastatin (CRESTOR) 20 MG tablet, Take 1 tablet by mouth Daily., Disp: 90 tablet, Rfl: 1  •  vitamin D3 125 MCG (5000 UT) capsule capsule, Take 5,000 Units by mouth Daily., Disp: , Rfl:     Problem List:  Patient Active Problem List   Diagnosis   • Gastroenteritis, acute   • Mixed hyperlipidemia   • Essential hypertension   • Left bundle branch block   • Low back pain   • Obesity   • Paroxysmal atrial fibrillation (HCC)   • Abnormal stress test     Past Medical History:  Past Medical History:   Diagnosis Date   • Atrial fibrillation (HCC)    • Hyperlipidemia    • Hypertension      Past Surgical History:  Past Surgical History:   Procedure Laterality Date   • CARDIAC CATHETERIZATION N/A 4/22/2022    Procedure: Left Heart Cath;  Surgeon: Truong Ortiz MD;  [facility] CATH INVASIVE [facility];  Service: Cardiology;  Laterality: N/A;     Social History:  Social History     Socioeconomic History   • Marital status:    Tobacco Use   • Smoking status: Never   • Smokeless  tobacco: Never   Vaping Use   • Vaping Use: Never used   Substance and Sexual Activity   • Alcohol use: Yes     Alcohol/week: 6.0 standard drinks     Types: 6 Cans of beer per week     Comment: a year    • Drug use: Never   • Sexual activity: Defer     Allergies:  No Known Allergies  Immunizations:  Immunization History   Administered Date(s) Administered   • COVID-19 (MODERNA) 1st, 2nd, 3rd Dose Only 02/10/2021, 03/16/2021   • Flu Vaccine Quad PF 6-35MO 09/24/2016, 10/20/2019   • FluLaval/Fluzone >6mos 10/20/2019   • Fluad Quad 65+ 10/20/2020   • Hepatitis A 04/29/2018, 11/05/2018   • Influenza Quad Vaccine (Inpatient) 10/28/2017   • Influenza, Unspecified 01/08/2013, 11/24/2013, 10/24/2014, 11/05/2018   • Pneumococcal Conjugate 13-Valent (PCV13) 09/24/2020   • Zostavax 12/08/2016            In-Office Procedure(s):  No orders to display        ASCVD RIsk Score::  The ASCVD Risk score (Sloan KHAN, et al., 2019) failed to calculate for the following reasons:    Cannot find a previous HDL lab    Cannot find a previous total cholesterol lab    Imaging:    Results for orders placed in visit on 05/20/21    XR Humerus Left    Narrative  left humerus X-Ray  Indication: Postop ORIF humerus  AP and lateral  Findings: Healed fracture hardware in position  no bony lesion  Soft tissues normal  not examined joint spaces  Hardware appropriately positioned yes      yes prior studies available for comparison.    X-RAY was ordered and reviewed by Jarrell Deng MD               Lab Review:   Admission on 04/22/2022, Discharged on 04/22/2022   Component Date Value   • Activated Clotting Time  04/22/2022 249 (H)    Lab on 04/22/2022   Component Date Value   • COVID19 04/22/2022 Not Detected    Office Visit on 04/20/2022   Component Date Value   • Glucose 04/22/2022 121 (H)    • BUN 04/22/2022 19    • Creatinine 04/22/2022 1.14    • Sodium 04/22/2022 141    • Potassium 04/22/2022 4.3    • Chloride 04/22/2022 105    • CO2 04/22/2022  26.0    • Calcium 04/22/2022 9.6    • Total Protein 04/22/2022 6.9    • Albumin 04/22/2022 4.50    • ALT (SGPT) 04/22/2022 26    • AST (SGOT) 04/22/2022 17    • Alkaline Phosphatase 04/22/2022 68    • Total Bilirubin 04/22/2022 0.7    • Globulin 04/22/2022 2.4    • A/G Ratio 04/22/2022 1.9    • BUN/Creatinine Ratio 04/22/2022 16.7    • Anion Gap 04/22/2022 10.0    • eGFR 04/22/2022 70.5    • Protime 04/22/2022 10.3    • INR 04/22/2022 1.00    • WBC 04/22/2022 5.90    • RBC 04/22/2022 4.54    • Hemoglobin 04/22/2022 14.4    • Hematocrit 04/22/2022 42.7    • MCV 04/22/2022 94.0    • MCH 04/22/2022 31.8    • MCHC 04/22/2022 33.8    • RDW 04/22/2022 13.5    • RDW-SD 04/22/2022 44.6    • MPV 04/22/2022 7.3    • Platelets 04/22/2022 210    • Neutrophil % 04/22/2022 48.5    • Lymphocyte % 04/22/2022 43.3    • Monocyte % 04/22/2022 6.7    • Eosinophil % 04/22/2022 1.0    • Basophil % 04/22/2022 0.5    • Neutrophils, Absolute 04/22/2022 2.90    • Lymphocytes, Absolute 04/22/2022 2.60    • Monocytes, Absolute 04/22/2022 0.40    • Eosinophils, Absolute 04/22/2022 0.10    • Basophils, Absolute 04/22/2022 0.00    • nRBC 04/22/2022 0.1    Hospital Outpatient Visit on 04/19/2022   Component Date Value   • Target HR (85%) 04/19/2022 130    • Max. Pred. HR (100%) 04/19/2022 153    • Nuclear Prior Study 04/19/2022 3    •  CV REST NUCLEAR ISOTO* 04/19/2022 7.3    • BH CV STRESS NUCLEAR ISO* 04/19/2022 21.9    • Nuc Stress EF 04/19/2022 51      Recent labs reviewed and interpreted for clinical significance and application            Level of Care:           Truong Ortiz MD  10/14/22  .

## 2022-11-21 RX ORDER — DILTIAZEM HYDROCHLORIDE 360 MG/1
CAPSULE, EXTENDED RELEASE ORAL
Qty: 90 CAPSULE | Refills: 0 | Status: SHIPPED | OUTPATIENT
Start: 2022-11-21 | End: 2022-12-21

## 2022-12-02 ENCOUNTER — HOSPITAL ENCOUNTER (OUTPATIENT)
Dept: INTERVENTIONAL RADIOLOGY/VASCULAR | Facility: HOSPITAL | Age: 67
Discharge: HOME | End: 2022-12-02
Admitting: ORTHOPAEDIC SURGERY

## 2022-12-02 DIAGNOSIS — M25.551 RIGHT HIP PAIN: ICD-10-CM

## 2022-12-02 LAB
APPEARANCE FLD: ABNORMAL
COLOR FLD: ABNORMAL
CRYSTALS FLD MICRO: NORMAL
EOSINOPHIL NFR FLD MANUAL: 2 %
LYMPHOCYTES NFR FLD MANUAL: 38 %
METHOD: ABNORMAL
MONOCYTES NFR FLD: 12 %
NEUTROPHILS NFR FLD MANUAL: 48 %
NUC CELL # FLD: 1525 /MM3

## 2022-12-02 PROCEDURE — 0 LIDOCAINE 1 % SOLUTION: Performed by: RADIOLOGY

## 2022-12-02 PROCEDURE — 87205 SMEAR GRAM STAIN: CPT | Performed by: ORTHOPAEDIC SURGERY

## 2022-12-02 PROCEDURE — 77002 NEEDLE LOCALIZATION BY XRAY: CPT

## 2022-12-02 PROCEDURE — 88305 TISSUE EXAM BY PATHOLOGIST: CPT | Performed by: ORTHOPAEDIC SURGERY

## 2022-12-02 PROCEDURE — 89060 EXAM SYNOVIAL FLUID CRYSTALS: CPT | Performed by: ORTHOPAEDIC SURGERY

## 2022-12-02 PROCEDURE — 89051 BODY FLUID CELL COUNT: CPT | Performed by: ORTHOPAEDIC SURGERY

## 2022-12-02 PROCEDURE — 87070 CULTURE OTHR SPECIMN AEROBIC: CPT | Performed by: ORTHOPAEDIC SURGERY

## 2022-12-02 PROCEDURE — 87075 CULTR BACTERIA EXCEPT BLOOD: CPT | Performed by: ORTHOPAEDIC SURGERY

## 2022-12-02 RX ORDER — LIDOCAINE HYDROCHLORIDE 10 MG/ML
INJECTION, SOLUTION INFILTRATION; PERINEURAL AS NEEDED
Status: COMPLETED | OUTPATIENT
Start: 2022-12-02 | End: 2022-12-02

## 2022-12-02 RX ADMIN — LIDOCAINE HYDROCHLORIDE 3 ML: 10 INJECTION, SOLUTION INFILTRATION; PERINEURAL at 08:13

## 2022-12-06 ENCOUNTER — OFFICE VISIT (OUTPATIENT)
Dept: CARDIOLOGY | Facility: CLINIC | Age: 67
End: 2022-12-06

## 2022-12-06 VITALS
SYSTOLIC BLOOD PRESSURE: 126 MMHG | DIASTOLIC BLOOD PRESSURE: 80 MMHG | BODY MASS INDEX: 31.89 KG/M2 | WEIGHT: 235.4 LBS | RESPIRATION RATE: 18 BRPM | HEART RATE: 66 BPM | HEIGHT: 72 IN

## 2022-12-06 DIAGNOSIS — Z01.810 PRE-OPERATIVE CARDIOVASCULAR EXAMINATION: ICD-10-CM

## 2022-12-06 DIAGNOSIS — I44.7 LBBB (LEFT BUNDLE BRANCH BLOCK): ICD-10-CM

## 2022-12-06 DIAGNOSIS — E78.2 MIXED HYPERLIPIDEMIA: ICD-10-CM

## 2022-12-06 DIAGNOSIS — I48.0 PAROXYSMAL ATRIAL FIBRILLATION: ICD-10-CM

## 2022-12-06 DIAGNOSIS — Z01.810 PREOPERATIVE CARDIOVASCULAR EXAMINATION: Primary | ICD-10-CM

## 2022-12-06 DIAGNOSIS — R06.09 DYSPNEA ON EXERTION: ICD-10-CM

## 2022-12-06 PROCEDURE — 99213 OFFICE O/P EST LOW 20 MIN: CPT | Performed by: INTERNAL MEDICINE

## 2022-12-06 RX ORDER — DICLOFENAC SODIUM 75 MG/1
TABLET, DELAYED RELEASE ORAL 2 TIMES DAILY
COMMUNITY
Start: 2022-12-04

## 2022-12-06 RX ORDER — ACETAMINOPHEN 500 MG
1000 TABLET ORAL 2 TIMES DAILY
COMMUNITY

## 2022-12-06 NOTE — PROGRESS NOTES
Cardiology Clinic Note  Troung Ortiz MD, PhD    Subjective:     Encounter Date:12/06/2022      Patient ID: Dillon Aaron is a 66 y.o. male.    Chief Complaint:  Chief Complaint   Patient presents with   • Follow-up       HPI:       Previously I had the pleasure to see this very pleasant 66-year-old gentleman in clinic.  He is here for follow-up of an abnormal stress test.  Blood pressure 138/82 heart rates in the 60s.  He has history of left bundle branch block as well as essential hypertension and paroxysmal atrial fibrillation.  He was seen recently by nurse practitioner for perioperative risk assessment prior to left anterior total hip surgery, his exertional capacity was very limited secondary to his hip.  He was hospitalized for atrial fibrillation many years ago but unclear whether he had an ischemic evaluation at that time.  He is not terribly symptomatic with any anginal chest pain but again is not very exertional.  He does have occasional shortness of breath.  We did discuss his previous abnormal stress test demonstrating ischemia in the septum.  .  He underwent invasive ischemic evaluation demonstrating 60% mid LAD disease compromising ostial proximal diagonal and the continuation LAD, FFR revealed insignificant hemodynamic status with 0.91 value, this is been managed medically, he is chest pain-free, we did discuss risk factor reduction with increasing cholesterol reduction, diet exercise weight loss, heart healthy diet consisting really.      He presents back needing the other hip operated on given significant pain and discomfort with ambulation.  He continues to have no chest pain, normal EF, well compensated euvolemic with no heart failure.  He continues to have sinus rhythm which is been unchanged with underlying left bundle branch block which again is unchanged.    Again.  We did discuss need for possible pacemaker in the future if he has progression of his conduction disease or symptomatic  bradycardia can be attributed to any feelings of presyncope dizziness fatigue listlessness or he has chronotropic incompetence in the future.    He denies these presently.  He is poorly exertional given his musculoskeletal issues and would not tolerate a treadmill for assessment of chronotropic incompetence today he says.  We discussed the cardiovascular risk stratification really proceeds and pertains to perioperative cardiac event risk with MI and he does not need ischemic evaluation prior to noncardiovascular surgery noting prior invasive findings, lack of symptoms, normal EF and lack of decreasing perioperative risk with coronary intervention without symptoms, heart failure, and would unduly delay any surgery.  He is awaiting hip surgery in the next 3 months    Perioperative evaluation does not need cardiovascular stress or cath prior to the surgery.  Can proceed accordingly        Diet exercise per AHA guidelines recommended  Mediterranean diet low-cholesterol        Review of systems negative for 14 point review of systems except as mentioned above     Historical data copied forward from previous encounters in EMR including the history, exam, and assessment/plan has been reviewed and is unchanged unless noted otherwise.     Cardiac medicines reviewed with risk, benefits, and necessity of each discussed.     Risk and benefit of cardiac testing including left heart catheterization reviewed including death heart attack stroke pain bleeding infection need for vascular /cardiovascular surgery were discussed and the patient      Objective:         Objective          Reviewed below     Physical Exam  Regular rate and rhythm no rubs murmurs gallops  No heave no lift  No clubbing cyanosis or edema  Clear to auscultation  Normocephalic atraumatic pupils equal round  Intact grossly  Soft nontender nondistended  Normal pulses normal cap refill  No carotid bruits or JVD  Unchanged from prior encounter    Assessment:  "        Assessment          Diagnoses and all orders for this visit:     1. Abnormal stress test (Primary)  FFR the LAD is 0.91  Nonobstructive disease in the circumflex and RCA  60% mid LAD disease with HONORIO-3 flow  Preserved EF  No new symptoms of any angina shortness of breath dyspnea exertion heart failure orthopnea PND or other complaints     3. Paroxysmal atrial fibrillation (HCC)  Maintaining sinus bradycardia, has had no recurrence thus far there has been documented  No anticoagulation warranted  Daily aspirin  Diltiazem on board  Off beta-blockers with sinus bradycardia     Hyperlipidemia and nonobstructive CAD, continue high intensity statin Crestor to 20 daily     Perioperative evaluation, no need for ischemic evaluation prior to noncardiovascular surgery with prior findings and invasive work-up this year in April 2022 with nonobstructive CAD  No new symptoms  No heart failure  Avoid perioperative hypotension  No pacemakers presently indicated without symptoms with stable heart rates in the 50s to 60s     No contraindication to any noncardiovascular surgery, does not need further ischemic evaluation at this time              See him back in 6 months         Historical data copied forward from previous encounters in EMR including the history, exam, and assessment/plan has been reviewed and is unchanged unless noted otherwise.    Cardiac medicines reviewed with risk, benefits, and necessity of each discussed.    Risk and benefit of cardiac testing reviewed including death heart attack stroke pain bleeding infection need for vascular /cardiovascular surgery were discussed and the patient     Objective:         /80 (BP Location: Right arm, Patient Position: Sitting)   Pulse 66   Resp 18   Ht 182.9 cm (72\")   Wt 107 kg (235 lb 6.4 oz)   BMI 31.93 kg/m²           The pleasure to be involved in this patient's cardiovascular care.  Please call with any questions or concerns  Truong Ortiz MD, " PhD    Most recent EKG as reviewed and interpreted by me:  Procedures     Most recent echo as reviewed and interpreted by me:      Most recent stress test as reviewed and interpreted by me:  Results for orders placed during the hospital encounter of 04/19/22    Stress Test With Myocardial Perfusion (1 Day)    Interpretation Summary  · Left ventricular ejection fraction is borderline normal. (Calculated EF = 51%).  · Myocardial perfusion imaging indicates a located in the apex and septal wall.  · Resting images with anterior anteroseptal perfusion defect small to medium in size, moderate severity with summed rest score of 6, stress demonstrates worsening with moderate brad-infarct ischemia it would appear with now involvement of the inferoseptal wall, summed stress score of 15 with summed difference score of 9 EF 51% .  · Findings consistent with an equivocal ECG stress test.    Abnormal study  Left bundle branch with uninterpretable stress ECG  Pharmacologic study  No arrhythmias seen  No ST changes seen  Submaximal stress  Nuclear imaging demonstrates at rest a perfusion defect in the anteroseptal wall small to medium in size moderate severity, stress induces worsening with at least moderate brad-infarct ischemia with additional involvement of the inferoseptal wall, summed difference score of 9, intermediate to high risk by criteria    Study concerning for ischemia in the septum and apex  EF 51%    Abnormal study      Most recent cardiac catheterization as reviewed interpreted by me:  Results for orders placed during the hospital encounter of 04/22/22    Cardiac Catheterization/Vascular Study    Narrative   Truong Ortiz MD, PhD  [facility] cardiology  Date of service 4-    Procedure  1.  Left heart catheterization with coronary angiography left ventriculography in MENESES position  2 FFR of the mid LAD with peripherally administered adenosine secondary to 60% bifurcation disease and  abnormal stress test    Indication  Abnormal stress test  Dyspnea on exertion    After informed consent patient was brought to Cath Lab sterilely prepped and draped in usual fashion with exposure of the right wrist for right radial access via micropuncture modified surgery technique with placement of a 5/6 slender sheath, standard cocktail of heparin nitroglycerin and Cardene was administered via the sheath followed by 035 guidewire to the aortic valve followed by JL 3 for left coronary angiography, JR4 for right coronary angiography, JR4 was used across aortic valve followed by hand-injection for LV gram, EDP assessment and pullback assessment of the transaortic valve gradient.  Secondary to moderate disease of the mid LAD at the bifurcation with acute angle takeoff of the diagonal branch and abnormal stress test decision was made for hemodynamic assessment.  Patient was heparinized with 100 units/kg of heparin for ACT greater than 250.  A 6 Welsh EBU 3.5 guide was used engage left main followed by fractional flow reserve via standard technique with equalization of the left main and peripherally administered adenosine at 140 mcg/kg/min, minimum FFR value of 0.91 which was insignificant hemodynamically despite 60% bifurcation disease but in a very large caliber portion of the vessel.  Pullback revealed no significant drift and then all catheters and equipment removed.  Patient had no complications and left the Cath Lab chest pain-free hemodynamically electrically stable alert talking to staff neurologically grossly intact bilaterally with TR band    Findings  1.  Opening aortic pressure 134/84  2.  Closing pressure was unchanged  3.  LVEDP 12-14  4.  Normal LV systolic function 60%  5.  No transaortic valve gradient seen on pullback    Complications none  Blood loss less than 5 cc  Contrast 95 cc  Moderate conscious sedation time of 45 minutes with IV Versed and fentanyl administered by registered nurse with  complete ECG pulse oximetry and hemodynamic monitoring throughout the entirety the case observed by me    Angiography  1 left main large-caliber vessel no disease  2.  LAD is a large-caliber vessel coursing to and around the apex with a large diagonal branch in the midportion, the proximal diagonal branch is essentially a ramus intermedius, proximal LAD diffusely medical irregularities 10% maximally, there is a mid to distal takeoff of a sizable diagonal branch at least 3 mm in diameter with acute angle takeoff, this bifurcation has 50 to 60% stenosis involving lara 1/1/1 classification with inflow LAD as well as both continuation LAD and ostial diagonal locations by definition.  Distally there is only luminal irregularities less than 10%  3.  Circumflex is a large-caliber codominant vessel with large PLV branch, there is no angiographic disease of any significance with only mild luminal irregularities less than 10%, obtuse marginal branch is a least 4 mm in diameter and trifurcates along the lateral wall  4.  RCA is a large-caliber codominant vessel, there is diffuse luminal regularities throughout 20% with PDA that is widely patent but there is slow flow indicative of possibly some microvascular dysfunction.  There is no obstructive CAD    Conclusions and recommendations  1.  Diffuse luminary  irregularities throughout all coronaries most severe in the RCA, moderate disease in the mid LAD 50 to 60% at the bifurcation of the diagonal branch in the midportion, negative FFR at 0.91 not hemodynamically significant and can be treated medically  2.  Preserved LVEF, normal transaortic valve gradient, normal LVEDP    Secondary prevention goals for CAD, diet exercise cholesterol reduction to try to minimize and reduce plaque burden    Patient be discharged once discharge criteria met today in follow-up as outpatient    Truong Ortiz MD, PhD    The following portions of the patient's history were reviewed and updated as  appropriate: allergies, current medications, past family history, past medical history, past social history, past surgical history and problem list.      ROS:  14 point review of systems negative except as mentioned above    Current Outpatient Medications:   •  acetaminophen (TYLENOL) 500 MG tablet, Take 1,000 mg by mouth 2 (Two) Times a Day., Disp: , Rfl:   •  aspirin 81 MG chewable tablet, Chew 81 mg Daily., Disp: , Rfl:   •  diclofenac (VOLTAREN) 75 MG EC tablet, 2 (Two) Times a Day., Disp: , Rfl:   •  dilTIAZem (TIAZAC) 360 MG 24 hr capsule, TAKE 1 CAPSULE BY MOUTH EVERY DAY, Disp: 90 capsule, Rfl: 0  •  finasteride (PROSCAR) 5 MG tablet, Take 5 mg by mouth Daily., Disp: , Rfl:   •  rosuvastatin (CRESTOR) 20 MG tablet, Take 1 tablet by mouth Daily., Disp: 90 tablet, Rfl: 1  •  vitamin D3 125 MCG (5000 UT) capsule capsule, Take 5,000 Units by mouth Daily., Disp: , Rfl:     Problem List:  Patient Active Problem List   Diagnosis   • Gastroenteritis, acute   • Mixed hyperlipidemia   • Essential hypertension   • Left bundle branch block   • Low back pain   • Obesity   • Paroxysmal atrial fibrillation (HCC)   • Abnormal stress test     Past Medical History:  Past Medical History:   Diagnosis Date   • Atrial fibrillation (HCC)    • Hyperlipidemia    • Hypertension      Past Surgical History:  Past Surgical History:   Procedure Laterality Date   • CARDIAC CATHETERIZATION N/A 4/22/2022    Procedure: Left Heart Cath;  Surgeon: Truong Ortiz MD;  Location: [facility] CATH INVASIVE LOCATION;  Service: Cardiology;  Laterality: N/A;     Social History:  Social History     Socioeconomic History   • Marital status:    Tobacco Use   • Smoking status: Never   • Smokeless tobacco: Never   Vaping Use   • Vaping Use: Never used   Substance and Sexual Activity   • Alcohol use: Yes     Alcohol/week: 6.0 standard drinks     Types: 6 Cans of beer per week     Comment: a year    • Drug use: Never   • Sexual activity: Defer      Allergies:  No Known Allergies  Immunizations:  Immunization History   Administered Date(s) Administered   • COVID-19 (MODERNA) 1st, 2nd, 3rd Dose Only 02/10/2021, 03/16/2021   • Flu Vaccine Quad PF 6-35MO 09/24/2016, 10/20/2019   • FluLaval/Fluzone >6mos 10/20/2019   • Fluad Quad 65+ 10/20/2020   • Hepatitis A 04/29/2018, 11/05/2018   • Influenza Quad Vaccine (Inpatient) 10/28/2017   • Influenza, Unspecified 01/08/2013, 11/24/2013, 10/24/2014, 11/05/2018   • Pneumococcal Conjugate 13-Valent (PCV13) 09/24/2020   • Zostavax 12/08/2016            In-Office Procedure(s):  No orders to display        ASCVD RIsk Score::  The ASCVD Risk score (Sloan KHAN, et al., 2019) failed to calculate for the following reasons:    Cannot find a previous HDL lab    Cannot find a previous total cholesterol lab    Imaging:    Results for orders placed in visit on 05/20/21    XR Humerus Left    Narrative  left humerus X-Ray  Indication: Postop ORIF humerus  AP and lateral  Findings: Healed fracture hardware in position  no bony lesion  Soft tissues normal  not examined joint spaces  Hardware appropriately positioned yes      yes prior studies available for comparison.    X-RAY was ordered and reviewed by Jarrell Deng MD               Lab Review:   Hospital Outpatient Visit on 12/02/2022   Component Date Value   • Anaerobic Culture 12/02/2022 No anaerobes isolated at 3 days    • Body Fluid Culture 12/02/2022 No growth at 4 days    • Gram Stain 12/02/2022 Rare (1+) WBCs per low power field    • Gram Stain 12/02/2022 No organisms seen    • Crystals, Fluid 12/02/2022 No crystals seen    • Color, Fluid 12/02/2022 Red    • Appearance, Fluid 12/02/2022 Turbid (A)    • Nucleated Cells, Fluid 12/02/2022 1,525    • Method: 12/02/2022 Hemacytometer Method    • Neutrophils, Fluid 12/02/2022 48    • Lymphocytes, Fluid 12/02/2022 38    • Monocytes, Fluid 12/02/2022 12    • Eosinophils, Fluid 12/02/2022 2      Recent labs reviewed and  interpreted for clinical significance and application            Level of Care:           Truong Ortiz MD  12/06/22  .

## 2022-12-07 LAB
BACTERIA FLD CULT: NORMAL
BACTERIA SPEC ANAEROBE CULT: NORMAL
GRAM STN SPEC: NORMAL
GRAM STN SPEC: NORMAL
LAB AP CASE REPORT: NORMAL
PATH REPORT.FINAL DX SPEC: NORMAL
PATH REPORT.GROSS SPEC: NORMAL

## 2022-12-16 ENCOUNTER — LAB (OUTPATIENT)
Dept: LAB | Facility: HOSPITAL | Age: 67
End: 2022-12-16

## 2022-12-16 ENCOUNTER — HOSPITAL ENCOUNTER (OUTPATIENT)
Dept: CARDIOLOGY | Facility: HOSPITAL | Age: 67
Discharge: HOME | End: 2022-12-16

## 2022-12-16 ENCOUNTER — TRANSCRIBE ORDERS (OUTPATIENT)
Dept: ADMINISTRATIVE | Facility: HOSPITAL | Age: 67
End: 2022-12-16

## 2022-12-16 DIAGNOSIS — Z01.818 PRE-OP EXAMINATION: Primary | ICD-10-CM

## 2022-12-16 DIAGNOSIS — Z01.818 PRE-OP EXAMINATION: ICD-10-CM

## 2022-12-16 LAB
ALBUMIN SERPL-MCNC: 4.9 G/DL (ref 3.5–5.2)
ANION GAP SERPL CALCULATED.3IONS-SCNC: 7.7 MMOL/L (ref 5–15)
BASOPHILS # BLD AUTO: 0.03 10*3/MM3 (ref 0–0.2)
BASOPHILS NFR BLD AUTO: 0.4 % (ref 0–1.5)
BUN SERPL-MCNC: 21 MG/DL (ref 8–23)
BUN/CREAT SERPL: 21.6 (ref 7–25)
CALCIUM SPEC-SCNC: 9.8 MG/DL (ref 8.6–10.5)
CHLORIDE SERPL-SCNC: 101 MMOL/L (ref 98–107)
CO2 SERPL-SCNC: 27.3 MMOL/L (ref 22–29)
CREAT SERPL-MCNC: 0.97 MG/DL (ref 0.76–1.27)
DEPRECATED RDW RBC AUTO: 43 FL (ref 37–54)
EGFRCR SERPLBLD CKD-EPI 2021: 85.6 ML/MIN/1.73
EOSINOPHIL # BLD AUTO: 0.09 10*3/MM3 (ref 0–0.4)
EOSINOPHIL NFR BLD AUTO: 1.3 % (ref 0.3–6.2)
ERYTHROCYTE [DISTWIDTH] IN BLOOD BY AUTOMATED COUNT: 12.9 % (ref 12.3–15.4)
GLUCOSE SERPL-MCNC: 99 MG/DL (ref 65–99)
HBA1C MFR BLD: 5.7 % (ref 3.5–5.6)
HCT VFR BLD AUTO: 40.7 % (ref 37.5–51)
HGB BLD-MCNC: 14.2 G/DL (ref 13–17.7)
IMM GRANULOCYTES # BLD AUTO: 0.03 10*3/MM3 (ref 0–0.05)
IMM GRANULOCYTES NFR BLD AUTO: 0.4 % (ref 0–0.5)
LYMPHOCYTES # BLD AUTO: 2.82 10*3/MM3 (ref 0.7–3.1)
LYMPHOCYTES NFR BLD AUTO: 41.1 % (ref 19.6–45.3)
MCH RBC QN AUTO: 31.8 PG (ref 26.6–33)
MCHC RBC AUTO-ENTMCNC: 34.9 G/DL (ref 31.5–35.7)
MCV RBC AUTO: 91.1 FL (ref 79–97)
MONOCYTES # BLD AUTO: 0.57 10*3/MM3 (ref 0.1–0.9)
MONOCYTES NFR BLD AUTO: 8.3 % (ref 5–12)
MRSA DNA SPEC QL NAA+PROBE: NORMAL
NEUTROPHILS NFR BLD AUTO: 3.32 10*3/MM3 (ref 1.7–7)
NEUTROPHILS NFR BLD AUTO: 48.5 % (ref 42.7–76)
NRBC BLD AUTO-RTO: 0 /100 WBC (ref 0–0.2)
PLATELET # BLD AUTO: 224 10*3/MM3 (ref 140–450)
PMV BLD AUTO: 9.8 FL (ref 6–12)
POTASSIUM SERPL-SCNC: 4.3 MMOL/L (ref 3.5–5.2)
QT INTERVAL: 464 MS
RBC # BLD AUTO: 4.47 10*6/MM3 (ref 4.14–5.8)
SODIUM SERPL-SCNC: 136 MMOL/L (ref 136–145)
WBC NRBC COR # BLD: 6.86 10*3/MM3 (ref 3.4–10.8)

## 2022-12-16 PROCEDURE — 93005 ELECTROCARDIOGRAM TRACING: CPT | Performed by: ORTHOPAEDIC SURGERY

## 2022-12-16 PROCEDURE — 93010 ELECTROCARDIOGRAM REPORT: CPT | Performed by: INTERNAL MEDICINE

## 2022-12-16 PROCEDURE — 36415 COLL VENOUS BLD VENIPUNCTURE: CPT

## 2022-12-16 PROCEDURE — 80048 BASIC METABOLIC PNL TOTAL CA: CPT

## 2022-12-16 PROCEDURE — 85025 COMPLETE CBC W/AUTO DIFF WBC: CPT

## 2022-12-16 PROCEDURE — 87641 MR-STAPH DNA AMP PROBE: CPT

## 2022-12-16 PROCEDURE — 83036 HEMOGLOBIN GLYCOSYLATED A1C: CPT

## 2022-12-16 PROCEDURE — 82040 ASSAY OF SERUM ALBUMIN: CPT

## 2022-12-21 RX ORDER — DILTIAZEM HYDROCHLORIDE 360 MG/1
CAPSULE, EXTENDED RELEASE ORAL
Qty: 90 CAPSULE | Refills: 0 | Status: SHIPPED | OUTPATIENT
Start: 2022-12-21 | End: 2023-03-23

## 2022-12-27 RX ORDER — ROSUVASTATIN CALCIUM 20 MG/1
TABLET, COATED ORAL
Qty: 90 TABLET | Refills: 0 | Status: SHIPPED | OUTPATIENT
Start: 2022-12-27 | End: 2023-03-17

## 2023-03-17 RX ORDER — ROSUVASTATIN CALCIUM 20 MG/1
TABLET, COATED ORAL
Qty: 90 TABLET | Refills: 1 | Status: SHIPPED | OUTPATIENT
Start: 2023-03-17

## 2023-03-23 RX ORDER — DILTIAZEM HYDROCHLORIDE 360 MG/1
CAPSULE, EXTENDED RELEASE ORAL
Qty: 90 CAPSULE | Refills: 0 | Status: SHIPPED | OUTPATIENT
Start: 2023-03-23

## 2023-04-24 RX ORDER — DILTIAZEM HYDROCHLORIDE 360 MG/1
CAPSULE, EXTENDED RELEASE ORAL
Qty: 90 CAPSULE | Refills: 0 | Status: SHIPPED | OUTPATIENT
Start: 2023-04-24

## 2023-06-06 ENCOUNTER — OFFICE VISIT (OUTPATIENT)
Dept: CARDIOLOGY | Facility: CLINIC | Age: 68
End: 2023-06-06
Payer: MEDICARE

## 2023-06-06 VITALS
DIASTOLIC BLOOD PRESSURE: 78 MMHG | RESPIRATION RATE: 18 BRPM | HEART RATE: 60 BPM | SYSTOLIC BLOOD PRESSURE: 140 MMHG | WEIGHT: 243 LBS | BODY MASS INDEX: 32.91 KG/M2 | HEIGHT: 72 IN

## 2023-06-06 DIAGNOSIS — Z01.818 PRE-OP EXAMINATION: Primary | ICD-10-CM

## 2023-06-06 DIAGNOSIS — Z01.810 PRE-OPERATIVE CARDIOVASCULAR EXAMINATION: ICD-10-CM

## 2023-06-06 DIAGNOSIS — E78.2 MIXED HYPERLIPIDEMIA: ICD-10-CM

## 2023-06-06 DIAGNOSIS — R06.09 DYSPNEA ON EXERTION: ICD-10-CM

## 2023-06-06 RX ORDER — MELOXICAM 15 MG/1
1 TABLET ORAL DAILY
COMMUNITY
Start: 2023-05-25

## 2023-06-08 NOTE — PROGRESS NOTES
Cardiology Clinic Note  Truong Ortiz MD, PhD    Subjective:     Encounter Date:06/06/2023      Patient ID: Dillon Aaron is a 67 y.o. male.    Chief Complaint:  Chief Complaint   Patient presents with    Follow-up       HPI:      Previously I had the pleasure to see this very pleasant 66-year-old gentleman in clinic.  He is here for follow-up of an abnormal stress test.  Blood pressure 138/82 heart rates in the 60s.  He has history of left bundle branch block as well as essential hypertension and paroxysmal atrial fibrillation.  He was seen recently by nurse practitioner for perioperative risk assessment prior to left anterior total hip surgery, his exertional capacity was very limited secondary to his hip.  He was hospitalized for atrial fibrillation many years ago but unclear whether he had an ischemic evaluation at that time.  He is not terribly symptomatic with any anginal chest pain but again is not very exertional.  He does have occasional shortness of breath.  We did discuss his previous abnormal stress test demonstrating ischemia in the septum.  .  He underwent invasive ischemic evaluation demonstrating 60% mid LAD disease compromising ostial proximal diagonal and the continuation LAD, FFR revealed insignificant hemodynamic status with 0.91 value, this is been managed medically, he is chest pain-free, we did discuss risk factor reduction with increasing cholesterol reduction, diet exercise weight loss, heart healthy diet consisting really.      He presents back status post hip surgery otherwise doing well, was ambulating shortly after hip surgery with pain much improved overall.  He is increasing his ambulatory ability and stamina.  He continues to have no chest pain, normal EF, well compensated euvolemic with no heart failure.  He continues to have sinus rhythm which is been unchanged with underlying left bundle branch block which again is unchanged.  With prior conduction abnormalities, previously we did  discuss need for possible pacemaker in the future if he has progression of his conduction disease or symptomatic bradycardia can be attributed to any feelings of presyncope dizziness fatigue listlessness or he has chronotropic incompetence in the future.    He denies these presently.  He is doing well from CV standpoint, blood pressures are controlled less than 135 systolic, heart rates of 60, no anginal chest pain, no heart failure signs or symptoms stable medicines on aspirin statin therapy appropriately     Perioperative evaluation does not need cardiovascular stress or cath prior to the surgery.  Can proceed accordingly        Diet exercise per AHA guidelines recommended  Mediterranean diet low-cholesterol        Review of systems negative for 14 point review of systems except as mentioned above     Historical data copied forward from previous encounters in EMR including the history, exam, and assessment/plan has been reviewed and is unchanged unless noted otherwise.     Cardiac medicines reviewed with risk, benefits, and necessity of each discussed.     Risk and benefit of cardiac testing including left heart catheterization reviewed including death heart attack stroke pain bleeding infection need for vascular /cardiovascular surgery were discussed and the patient      Objective:         Objective          Reviewed below     Physical Exam  Regular rate and rhythm no rubs murmurs gallops  No heave no lift  No clubbing cyanosis or edema  Clear to auscultation  Normocephalic atraumatic pupils equal round  Intact grossly  Soft nontender nondistended  Normal pulses normal cap refill  No carotid bruits or JVD  Unchanged from prior encounter     Assessment:         Assessment          Diagnoses and all orders for this visit:     1. Abnormal stress test (Primary)  FFR the LAD is 0.91  Nonobstructive disease in the circumflex and RCA  60% mid LAD disease with HONORIO-3 flow  Preserved EF  No new symptoms of any angina  "shortness of breath dyspnea exertion heart failure orthopnea PND or other complaints     3. Paroxysmal atrial fibrillation (HCC)  Maintaining sinus bradycardia, has had no recurrence thus far there has been documented  No anticoagulation warranted  Daily aspirin  Diltiazem on board  Off beta-blockers with sinus bradycardia     Hyperlipidemia and nonobstructive CAD, continue high intensity statin Crestor to 20 daily     Perioperative evaluation, no need for ischemic evaluation prior to any necessary noncardiovascular surgery with prior findings and invasive work-up last year in April 2022 with nonobstructive CAD  No new symptoms  No heart failure  Avoid perioperative hypotension  No pacemakers presently indicated without symptoms with stable heart rates in the 50s to 60s     No contraindication to any noncardiovascular surgery, does not need further ischemic evaluation at this time     Continue present CV treatment plan  See him back in 9 months       Objective:         /78 (BP Location: Left arm, Patient Position: Sitting)   Pulse 60   Resp 18   Ht 182.9 cm (72\")   Wt 110 kg (243 lb)   BMI 32.96 kg/m²           The pleasure to be involved in this patient's cardiovascular care.  Please call with any questions or concerns  Truong Ortiz MD, PhD    Most recent EKG as reviewed and interpreted by me:  Procedures     Most recent echo as reviewed and interpreted by me:      Most recent stress test as reviewed and interpreted by me:  Results for orders placed during the hospital encounter of 04/19/22    Stress Test With Myocardial Perfusion (1 Day)    Interpretation Summary  · Left ventricular ejection fraction is borderline normal. (Calculated EF = 51%).  · Myocardial perfusion imaging indicates a located in the apex and septal wall.  · Resting images with anterior anteroseptal perfusion defect small to medium in size, moderate severity with summed rest score of 6, stress demonstrates worsening with moderate " brad-infarct ischemia it would appear with now involvement of the inferoseptal wall, summed stress score of 15 with summed difference score of 9 EF 51% .  · Findings consistent with an equivocal ECG stress test.    Abnormal study  Left bundle branch with uninterpretable stress ECG  Pharmacologic study  No arrhythmias seen  No ST changes seen  Submaximal stress  Nuclear imaging demonstrates at rest a perfusion defect in the anteroseptal wall small to medium in size moderate severity, stress induces worsening with at least moderate brad-infarct ischemia with additional involvement of the inferoseptal wall, summed difference score of 9, intermediate to high risk by criteria    Study concerning for ischemia in the septum and apex  EF 51%    Abnormal study      Most recent cardiac catheterization as reviewed interpreted by me:  Results for orders placed during the hospital encounter of 04/22/22    Cardiac Catheterization/Vascular Study    Narrative   Truong Ortiz MD, PhD  [facility]  Date of service 4-    Procedure  1.  Left heart catheterization with coronary angiography left ventriculography in MENESES position  2 FFR of the mid LAD with peripherally administered adenosine secondary to 60% bifurcation disease and abnormal stress test    Indication  Abnormal stress test  Dyspnea on exertion    After informed consent patient was brought to Cath Lab sterilely prepped and draped in usual fashion with exposure of the right wrist for right radial access via micropuncture modified surgery technique with placement of a 5/6 slender sheath, standard cocktail of heparin nitroglycerin and Cardene was administered via the sheath followed by 035 guidewire to the aortic valve followed by JL 3 for left coronary angiography, JR4 for right coronary angiography, JR4 was used across aortic valve followed by hand-injection for LV gram, EDP assessment and pullback assessment of the transaortic valve  gradient.  Secondary to moderate disease of the mid LAD at the bifurcation with acute angle takeoff of the diagonal branch and abnormal stress test decision was made for hemodynamic assessment.  Patient was heparinized with 100 units/kg of heparin for ACT greater than 250.  A 6 Swiss EBU 3.5 guide was used engage left main followed by fractional flow reserve via standard technique with equalization of the left main and peripherally administered adenosine at 140 mcg/kg/min, minimum FFR value of 0.91 which was insignificant hemodynamically despite 60% bifurcation disease but in a very large caliber portion of the vessel.  Pullback revealed no significant drift and then all catheters and equipment removed.  Patient had no complications and left the Cath Lab chest pain-free hemodynamically electrically stable alert talking to staff neurologically grossly intact bilaterally with TR band    Findings  1.  Opening aortic pressure 134/84  2.  Closing pressure was unchanged  3.  LVEDP 12-14  4.  Normal LV systolic function 60%  5.  No transaortic valve gradient seen on pullback    Complications none  Blood loss less than 5 cc  Contrast 95 cc  Moderate conscious sedation time of 45 minutes with IV Versed and fentanyl administered by registered nurse with complete ECG pulse oximetry and hemodynamic monitoring throughout the entirety the case observed by me    Angiography  1 left main large-caliber vessel no disease  2.  LAD is a large-caliber vessel coursing to and around the apex with a large diagonal branch in the midportion, the proximal diagonal branch is essentially a ramus intermedius, proximal LAD diffusely medical irregularities 10% maximally, there is a mid to distal takeoff of a sizable diagonal branch at least 3 mm in diameter with acute angle takeoff, this bifurcation has 50 to 60% stenosis involving lara 1/1/1 classification with inflow LAD as well as both continuation LAD and ostial diagonal locations by  definition.  Distally there is only luminal irregularities less than 10%  3.  Circumflex is a large-caliber codominant vessel with large PLV branch, there is no angiographic disease of any significance with only mild luminal irregularities less than 10%, obtuse marginal branch is a least 4 mm in diameter and trifurcates along the lateral wall  4.  RCA is a large-caliber codominant vessel, there is diffuse luminal regularities throughout 20% with PDA that is widely patent but there is slow flow indicative of possibly some microvascular dysfunction.  There is no obstructive CAD    Conclusions and recommendations  1.  Diffuse luminary  irregularities throughout all coronaries most severe in the RCA, moderate disease in the mid LAD 50 to 60% at the bifurcation of the diagonal branch in the midportion, negative FFR at 0.91 not hemodynamically significant and can be treated medically  2.  Preserved LVEF, normal transaortic valve gradient, normal LVEDP    Secondary prevention goals for CAD, diet exercise cholesterol reduction to try to minimize and reduce plaque burden    Patient be discharged once discharge criteria met today in follow-up as outpatient    Truong Ortiz MD, PhD    The following portions of the patient's history were reviewed and updated as appropriate: allergies, current medications, past family history, past medical history, past social history, past surgical history, and problem list.      ROS:  14 point review of systems negative except as mentioned above    Current Outpatient Medications:     aspirin 81 MG chewable tablet, Chew 1 tablet Daily., Disp: , Rfl:     dilTIAZem (TIAZAC) 360 MG 24 hr capsule, TAKE 1 CAPSULE BY MOUTH EVERY DAY, Disp: 90 capsule, Rfl: 0    finasteride (PROSCAR) 5 MG tablet, Take 1 tablet by mouth Daily., Disp: , Rfl:     meloxicam (MOBIC) 15 MG tablet, Take 1 tablet by mouth Daily., Disp: , Rfl:     rosuvastatin (CRESTOR) 20 MG tablet, TAKE 1 TABLET BY MOUTH EVERY DAY, Disp: 90  tablet, Rfl: 1    vitamin D3 125 MCG (5000 UT) capsule capsule, Take 1 capsule by mouth Daily., Disp: , Rfl:     Problem List:  Patient Active Problem List   Diagnosis    Gastroenteritis, acute    Mixed hyperlipidemia    Essential hypertension    Left bundle branch block    Low back pain    Obesity    Paroxysmal atrial fibrillation    Abnormal stress test     Past Medical History:  Past Medical History:   Diagnosis Date    Atrial fibrillation     Hyperlipidemia     Hypertension      Past Surgical History:  Past Surgical History:   Procedure Laterality Date    CARDIAC CATHETERIZATION N/A 4/22/2022    Procedure: Left Heart Cath;  Surgeon: Truong Ortiz MD;  Location: [facility] CATH INVASIVE LOCATION;  Service: Cardiology;  Laterality: N/A;     Social History:  Social History     Socioeconomic History    Marital status:    Tobacco Use    Smoking status: Never    Smokeless tobacco: Never   Vaping Use    Vaping Use: Never used   Substance and Sexual Activity    Alcohol use: Yes     Alcohol/week: 6.0 standard drinks     Types: 6 Cans of beer per week     Comment: a year     Drug use: Never    Sexual activity: Defer     Allergies:  No Known Allergies  Immunizations:  Immunization History   Administered Date(s) Administered    COVID-19 (MODERNA) 1st,2nd,3rd Dose Monovalent 02/10/2021, 03/16/2021    Flu Vaccine Quad PF 6-35MO 09/24/2016, 10/20/2019    FluLaval/Fluzone >6mos 10/20/2019    Fluad Quad 65+ 10/20/2020    Hepatitis A 04/29/2018, 11/05/2018    Influenza Quad Vaccine (Inpatient) 10/28/2017    Influenza, Unspecified 01/08/2013, 11/24/2013, 10/24/2014, 11/05/2018    Pneumococcal Conjugate 13-Valent (PCV13) 09/24/2020    Zostavax 12/08/2016            In-Office Procedure(s):  No orders to display        ASCVD RIsk Score::  The ASCVD Risk score (Sloan DK, et al., 2019) failed to calculate for the following reasons:    Cannot find a previous HDL lab    Cannot find a previous total cholesterol  lab    Imaging:    Results for orders placed in visit on 05/20/21    XR Humerus Left    Narrative  left humerus X-Ray  Indication: Postop ORIF humerus  AP and lateral  Findings: Healed fracture hardware in position  no bony lesion  Soft tissues normal  not examined joint spaces  Hardware appropriately positioned yes      yes prior studies available for comparison.    X-RAY was ordered and reviewed by Jarrell Deng MD               Lab Review:   Hospital Outpatient Visit on 12/16/2022   Component Date Value    QT Interval 12/16/2022 464    Lab on 12/16/2022   Component Date Value    Glucose 12/16/2022 99     BUN 12/16/2022 21     Creatinine 12/16/2022 0.97     Sodium 12/16/2022 136     Potassium 12/16/2022 4.3     Chloride 12/16/2022 101     CO2 12/16/2022 27.3     Calcium 12/16/2022 9.8     BUN/Creatinine Ratio 12/16/2022 21.6     Anion Gap 12/16/2022 7.7     eGFR 12/16/2022 85.6     Albumin 12/16/2022 4.90     Hemoglobin A1C 12/16/2022 5.7 (H)     WBC 12/16/2022 6.86     RBC 12/16/2022 4.47     Hemoglobin 12/16/2022 14.2     Hematocrit 12/16/2022 40.7     MCV 12/16/2022 91.1     MCH 12/16/2022 31.8     MCHC 12/16/2022 34.9     RDW 12/16/2022 12.9     RDW-SD 12/16/2022 43.0     MPV 12/16/2022 9.8     Platelets 12/16/2022 224     Neutrophil % 12/16/2022 48.5     Lymphocyte % 12/16/2022 41.1     Monocyte % 12/16/2022 8.3     Eosinophil % 12/16/2022 1.3     Basophil % 12/16/2022 0.4     Immature Grans % 12/16/2022 0.4     Neutrophils, Absolute 12/16/2022 3.32     Lymphocytes, Absolute 12/16/2022 2.82     Monocytes, Absolute 12/16/2022 0.57     Eosinophils, Absolute 12/16/2022 0.09     Basophils, Absolute 12/16/2022 0.03     Immature Grans, Absolute 12/16/2022 0.03     nRBC 12/16/2022 0.0     MRSA PCR 12/16/2022 No MRSA Detected      Recent labs reviewed and interpreted for clinical significance and application            Level of Care:           Truong Ortiz MD  06/08/23  .

## 2023-09-21 RX ORDER — ROSUVASTATIN CALCIUM 20 MG/1
TABLET, COATED ORAL
Qty: 90 TABLET | Refills: 0 | Status: SHIPPED | OUTPATIENT
Start: 2023-09-21

## 2023-09-29 RX ORDER — DILTIAZEM HYDROCHLORIDE 360 MG/1
360 CAPSULE, EXTENDED RELEASE ORAL DAILY
Qty: 90 CAPSULE | Refills: 0 | Status: SHIPPED | OUTPATIENT
Start: 2023-09-29

## 2023-09-29 NOTE — TELEPHONE ENCOUNTER
Caller: Dillon Aaron    Relationship: Self    Best call back number: [phone]    Requested Prescriptions:   Requested Prescriptions     Pending Prescriptions Disp Refills    dilTIAZem (TIAZAC) 360 MG 24 hr capsule 90 capsule 0     Sig: Take 1 capsule by mouth Daily.        Pharmacy where request should be sent: [id number] [address] [facility] - [phone]  - [phone] FX     Last office visit with prescribing clinician: 6/6/2023   Last telemedicine visit with prescribing clinician: Visit date not found   Next office visit with prescribing clinician: 3/6/2024       Does the patient have less than a 3 day supply:  [x] Yes  [] No    Would you like a call back once the refill request has been completed: [x] Yes [] No    If the office needs to give you a call back, can they leave a voicemail: [x] Yes [] No    Nubia Rizzo Rep   09/29/23 11:58 EDT

## 2023-10-18 ENCOUNTER — LAB (OUTPATIENT)
Dept: LAB | Facility: HOSPITAL | Age: 68
End: 2023-10-18
Payer: MEDICARE

## 2023-10-18 ENCOUNTER — HOSPITAL ENCOUNTER (OUTPATIENT)
Dept: CT IMAGING | Facility: HOSPITAL | Age: 68
Discharge: HOME | End: 2023-10-18
Payer: MEDICARE

## 2023-10-18 ENCOUNTER — TRANSCRIBE ORDERS (OUTPATIENT)
Dept: ADMINISTRATIVE | Facility: HOSPITAL | Age: 68
End: 2023-10-18
Payer: MEDICARE

## 2023-10-18 DIAGNOSIS — R06.02 SHORTNESS OF BREATH: ICD-10-CM

## 2023-10-18 DIAGNOSIS — R07.89 OTHER CHEST PAIN: Primary | ICD-10-CM

## 2023-10-18 DIAGNOSIS — R07.89 OTHER CHEST PAIN: ICD-10-CM

## 2023-10-18 LAB
ALBUMIN SERPL-MCNC: 4.4 G/DL (ref 3.5–5.2)
ALBUMIN/GLOB SERPL: 2.2 G/DL
ALP SERPL-CCNC: 60 U/L (ref 39–117)
ALT SERPL W P-5'-P-CCNC: 30 U/L (ref 1–41)
ANION GAP SERPL CALCULATED.3IONS-SCNC: 8 MMOL/L (ref 5–15)
AST SERPL-CCNC: 22 U/L (ref 1–40)
BASOPHILS # BLD AUTO: 0 10*3/MM3 (ref 0–0.2)
BASOPHILS NFR BLD AUTO: 0.4 % (ref 0–1.5)
BILIRUB SERPL-MCNC: 0.8 MG/DL (ref 0–1.2)
BUN SERPL-MCNC: 24 MG/DL (ref 8–23)
BUN/CREAT SERPL: 20.3 (ref 7–25)
CALCIUM SPEC-SCNC: 9.7 MG/DL (ref 8.6–10.5)
CHLORIDE SERPL-SCNC: 105 MMOL/L (ref 98–107)
CO2 SERPL-SCNC: 29 MMOL/L (ref 22–29)
CREAT BLDA-MCNC: 1.3 MG/DL (ref 0.6–1.3)
CREAT SERPL-MCNC: 1.18 MG/DL (ref 0.76–1.27)
D DIMER PPP FEU-MCNC: 0.74 MG/L (FEU) (ref 0–0.68)
DEPRECATED RDW RBC AUTO: 47.3 FL (ref 37–54)
EGFRCR SERPLBLD CKD-EPI 2021: 59.8 ML/MIN/1.73
EGFRCR SERPLBLD CKD-EPI 2021: 67.2 ML/MIN/1.73
EOSINOPHIL # BLD AUTO: 0 10*3/MM3 (ref 0–0.4)
EOSINOPHIL NFR BLD AUTO: 0.8 % (ref 0.3–6.2)
ERYTHROCYTE [DISTWIDTH] IN BLOOD BY AUTOMATED COUNT: 14.2 % (ref 12.3–15.4)
GLOBULIN UR ELPH-MCNC: 2 GM/DL
GLUCOSE SERPL-MCNC: 98 MG/DL (ref 65–99)
HCT VFR BLD AUTO: 43.3 % (ref 37.5–51)
HGB BLD-MCNC: 14.3 G/DL (ref 13–17.7)
LYMPHOCYTES # BLD AUTO: 2.3 10*3/MM3 (ref 0.7–3.1)
LYMPHOCYTES NFR BLD AUTO: 39.8 % (ref 19.6–45.3)
MCH RBC QN AUTO: 31.9 PG (ref 26.6–33)
MCHC RBC AUTO-ENTMCNC: 33 G/DL (ref 31.5–35.7)
MCV RBC AUTO: 96.9 FL (ref 79–97)
MONOCYTES # BLD AUTO: 0.5 10*3/MM3 (ref 0.1–0.9)
MONOCYTES NFR BLD AUTO: 8.1 % (ref 5–12)
NEUTROPHILS NFR BLD AUTO: 3 10*3/MM3 (ref 1.7–7)
NEUTROPHILS NFR BLD AUTO: 50.9 % (ref 42.7–76)
NRBC BLD AUTO-RTO: 0 /100 WBC (ref 0–0.2)
NT-PROBNP SERPL-MCNC: 1874 PG/ML (ref 0–900)
PLATELET # BLD AUTO: 201 10*3/MM3 (ref 140–450)
PMV BLD AUTO: 7.8 FL (ref 6–12)
POTASSIUM SERPL-SCNC: 4.8 MMOL/L (ref 3.5–5.2)
PROT SERPL-MCNC: 6.4 G/DL (ref 6–8.5)
RBC # BLD AUTO: 4.47 10*6/MM3 (ref 4.14–5.8)
SODIUM SERPL-SCNC: 142 MMOL/L (ref 136–145)
TROPONIN T SERPL HS-MCNC: 20 NG/L
TROPONIN T SERPL HS-MCNC: 21 NG/L
WBC NRBC COR # BLD: 5.8 10*3/MM3 (ref 3.4–10.8)

## 2023-10-18 PROCEDURE — 36415 COLL VENOUS BLD VENIPUNCTURE: CPT

## 2023-10-18 PROCEDURE — 85025 COMPLETE CBC W/AUTO DIFF WBC: CPT

## 2023-10-18 PROCEDURE — 80053 COMPREHEN METABOLIC PANEL: CPT

## 2023-10-18 PROCEDURE — 82565 ASSAY OF CREATININE: CPT

## 2023-10-18 PROCEDURE — 71275 CT ANGIOGRAPHY CHEST: CPT

## 2023-10-18 PROCEDURE — 84484 ASSAY OF TROPONIN QUANT: CPT

## 2023-10-18 PROCEDURE — 85379 FIBRIN DEGRADATION QUANT: CPT

## 2023-10-18 PROCEDURE — 83880 ASSAY OF NATRIURETIC PEPTIDE: CPT

## 2023-10-18 PROCEDURE — 25510000001 IOPAMIDOL PER 1 ML: Performed by: NURSE PRACTITIONER

## 2023-10-18 RX ADMIN — IOPAMIDOL 100 ML: 755 INJECTION, SOLUTION INTRAVENOUS at 17:07

## 2023-10-19 ENCOUNTER — TELEPHONE (OUTPATIENT)
Dept: CARDIOLOGY | Facility: CLINIC | Age: 68
End: 2023-10-19

## 2023-10-19 NOTE — TELEPHONE ENCOUNTER
AUTHORIZATION FOR RELEASE OF   CONFIDENTIAL INFORMATION    Dear Dr. Howell, Fax: [phone]    We are seeing Angelique Martin, date of birth 1961, in the clinic at [facility]. Tadeo Ambriz DO is the patient's PCP. Angelique Martin has an outstanding lab/procedure at the time we reviewed her chart. In order to help keep her health information updated, she has authorized us to request the following medical record(s):        (  )  MAMMOGRAM                                      ( X )  COLONOSCOPY      (  )  PAP SMEAR                                          (  )  OUTSIDE LAB RESULTS     (  )  DEXA SCAN                                          (  )  EYE EXAM            (  )  FOOT EXAM                                          (  )  ENTIRE RECORD     (  )  OUTSIDE IMMUNIZATIONS                 (  )  _______________         Please fax records to [facility], Quinn Quebodeaux, DO, [phone]     If you have any questions you can contact Sunitha Bolton at [phone].         Patient Name: Angelique Martin  : 1961  Patient Phone #: [phone]      Caller: Dillon Aaron    Relationship: Self    Best call back number: [phone]    What medications are you currently taking:   Current Outpatient Medications on File Prior to Visit   Medication Sig Dispense Refill    aspirin 81 MG chewable tablet Chew 1 tablet Daily.      dilTIAZem (TIAZAC) 360 MG 24 hr capsule Take 1 capsule by mouth Daily. 90 capsule 0    finasteride (PROSCAR) 5 MG tablet Take 1 tablet by mouth Daily.      meloxicam (MOBIC) 15 MG tablet Take 1 tablet by mouth Daily.      rosuvastatin (CRESTOR) 20 MG tablet TAKE 1 TABLET BY MOUTH EVERY DAY 90 tablet 0    vitamin D3 125 MCG (5000 UT) capsule capsule Take 1 capsule by mouth Daily.       Current Facility-Administered Medications on File Prior to Visit   Medication Dose Route Frequency Provider Last Rate Last Admin    [COMPLETED] iopamidol (ISOVUE-370) 76 % injection 100 mL  100 mL Intravenous Once in imaging Alice Velazquez APRN   100 mL at 10/18/23 1707            Which medication are you concerned about: BLOOD THINNER    What are your concerns: PT IS CALLING BECAUSE HE SPOKE WITH AN APRN JOSE ROBERTO JURADO AT HIS LAST PCP APPT ABOUT BEING PUT ON A BLOOD THINNER. HE WANTED TO MAKE SURE IT WAS OK WITH DR. GOODEN  AND GET HIS OPINION ON IT. PT SAID SHE FAXED OVER PAPERWORK ABOUT HIS APPT.    How long have you had these concerns: TODAY

## 2023-10-20 NOTE — TELEPHONE ENCOUNTER
Caller: Dillon Aaron    Relationship: Self    Best call back number: [phone]    What is the best time to reach you: ANY    Who are you requesting to speak with (clinical staff, provider,  specific staff member): DR GOODEN IF POSSIBLE    What was the call regarding: PATIENT DOESN'T REALLY WANT TO GO ON A BLOOD THINNER UNLESS HE ABSOLUTELY HAS TO , WANTS TO KNOW IF YOU CAN EVER COME BACK OFF.

## 2023-10-23 ENCOUNTER — OFFICE VISIT (OUTPATIENT)
Dept: CARDIOLOGY | Facility: CLINIC | Age: 68
End: 2023-10-23
Payer: MEDICARE

## 2023-10-23 VITALS
DIASTOLIC BLOOD PRESSURE: 86 MMHG | RESPIRATION RATE: 18 BRPM | SYSTOLIC BLOOD PRESSURE: 127 MMHG | HEART RATE: 83 BPM | HEIGHT: 72 IN | BODY MASS INDEX: 33.46 KG/M2 | WEIGHT: 247 LBS

## 2023-10-23 DIAGNOSIS — R06.02 SHORTNESS OF BREATH: Primary | ICD-10-CM

## 2023-10-23 DIAGNOSIS — I48.0 PAROXYSMAL ATRIAL FIBRILLATION: ICD-10-CM

## 2023-10-23 DIAGNOSIS — I48.19 ATRIAL FIBRILLATION, PERSISTENT: ICD-10-CM

## 2023-10-23 PROCEDURE — 3079F DIAST BP 80-89 MM HG: CPT | Performed by: INTERNAL MEDICINE

## 2023-10-23 PROCEDURE — 93000 ELECTROCARDIOGRAM COMPLETE: CPT | Performed by: INTERNAL MEDICINE

## 2023-10-23 PROCEDURE — 99214 OFFICE O/P EST MOD 30 MIN: CPT | Performed by: INTERNAL MEDICINE

## 2023-10-23 PROCEDURE — 3074F SYST BP LT 130 MM HG: CPT | Performed by: INTERNAL MEDICINE

## 2023-10-24 ENCOUNTER — TELEPHONE (OUTPATIENT)
Dept: CARDIOLOGY | Facility: CLINIC | Age: 68
End: 2023-10-24
Payer: MEDICARE

## 2023-10-24 NOTE — TELEPHONE ENCOUNTER
Spoke with patient and and let him know we can give him 10 days worth of Xarelto 20 mg and it will be ready for  in the office today

## 2023-10-24 NOTE — TELEPHONE ENCOUNTER
Caller: OSITO ALMANZAR    Relationship:SELF    Callback number: [phone]   Is it ok to leave a message: [] Yes [x] No    Requested medication for samples: XARELTO 20 MG    How much medication does the patient currently have left:     Who will be picking up the samples: OSITO ALMANZAR    Do you need information about patient financial assistance for this medication: [] Yes [x] No    Additional details provided:  PATIENT STATED THAT HE IS GOING TO BE 10 PILLS SHORT OF MEDICATION TO LAST HIM UNTIL HE SEE'S DR. GOODEN. HE STATED THAT DR. GOODEN HAD GIVEN HIM XARELTO 20 MG SAMPLES TO GET HIM THROUGH UNTIL HE HAD TESTING DONE AND SEE'S HIM AGAIN. PLEASE CONTACT PATIENT TO ADVISE WHEN HE CAN  SAMPLES.

## 2023-11-02 NOTE — PROGRESS NOTES
Cardiology Clinic Note  Truong Ortiz MD, PhD    Subjective:     Encounter Date:10/23/2023      Patient ID: Dillon Aaron is a 67 y.o. male.    Chief Complaint:  Chief Complaint   Patient presents with    Follow-up       HPI:        Previously I had the pleasure to see this very pleasant 67-year-old gentleman in clinic.    He has history of left bundle branch block as well as essential hypertension and paroxysmal atrial fibrillation.  He had previous abnormal stress test ordered for significant dyspnea on exertion and atypical symptoms which demonstrated ischemia in the septum.   He underwent invasive ischemic evaluation demonstrating 60% mid LAD disease compromising ostial proximal diagonal and the continuation LAD, FFR revealed borderline hemodynamic status with 0.91 value not meeting criteria for intervention, this is been managed medically, he was cleared for hip surgery and completed this successfully..   He has been followed intermittently with paroxysmal nature of his atrial fibrillation as well as CV comorbidities and nonobstructive CAD.      He presents back ultimately with recurrent atrial fibrillation today with underlying left bundle branch block left axis deviation with some reports of chest discomfort and dyspnea on exertion.  We discussed need for long-term anticoagulation with [id number] score of 3, lipid-lowering therapy, afterload reduction, heart rate control, secondary prevention and reevaluation of his coronary anatomy    Diet exercise per AHA guidelines recommended  Mediterranean diet low-cholesterol     EKG atrial fibrillation left bundle branch block today    Review of systems negative for 14 point review of systems except as mentioned above     Historical data copied forward from previous encounters in EMR including the history, exam, and assessment/plan has been reviewed and is unchanged unless noted otherwise.     Cardiac medicines reviewed with risk, benefits, and necessity of each  "discussed.     Risk and benefit of cardiac testing including left heart catheterization reviewed including death heart attack stroke pain bleeding infection need for vascular /cardiovascular surgery were discussed and the patient      Objective:         Objective          Reviewed below     Physical Exam  Regular rate and rhythm no rubs murmurs gallops  No heave no lift  No clubbing cyanosis or edema  Clear to auscultation  Normocephalic atraumatic pupils equal round  Intact grossly  Soft nontender nondistended  Normal pulses normal cap refill  No carotid bruits or JVD  Unchanged from prior encounter     Assessment:         Assessment          Diagnoses and all orders for this visit:     1. Abnormal stress test (Primary)  FFR the LAD is 0.91 April 2022  Nonobstructive disease in the circumflex and RCA  60% mid LAD disease with HONORIO-3 flow  Preserved EF    #2 dyspnea on exertion, return of atrial fibrillation  Lexiscan stress for risk stratification no progression of LAD disease  Repeat 2D echo with recurrent atrial fibrillation and shortness of breath, estimate filling pressures     3. Paroxysmal atrial fibrillation (HCC)  Xarelto 20 daily with chads Vascor of 3  Daily aspirin  Diltiazem on board  Off beta-blockers with sinus bradycardia previously     Hyperlipidemia and nonobstructive CAD, continue high intensity statin Crestor to 20 daily, correlate with lipid studies to meet goals per guidelines      Most recent labs demonstrate creatinine 1.3, normal electrolytes, A1c of 5.7, hemoglobin 14, elevated BNP and troponin mildly    Further recommendation follow findings    Objective:         /86 (BP Location: Right arm, Patient Position: Sitting)   Pulse 83   Resp 18   Ht 182.9 cm (72\")   Wt 112 kg (247 lb)   BMI 33.50 kg/m²   Diagnoses and all orders for this visit:    1. Shortness of breath (Primary)  -     Stress Test With Myocardial Perfusion One Day; Future    2. Atrial fibrillation, persistent    3. " Paroxysmal atrial fibrillation  -     Stress Test With Myocardial Perfusion One Day; Future  -     Adult Transthoracic Echo Complete W/ Color, Spectral and Contrast if Necessary Per Protocol; Future    Other orders  -     rivaroxaban (XARELTO) 20 MG tablet; Take 1 tablet by mouth Daily.  Dispense: 30 tablet; Refill: 5            The pleasure to be involved in this patient's cardiovascular care.  Please call with any questions or concerns  Truong Ortiz MD, PhD    Most recent EKG as reviewed and interpreted by me:    ECG 12 Lead    Date/Time: 10/23/2023 6:44 AM  Performed by: Truong Ortiz MD    Authorized by: Truong Ortiz MD  Comparison: not compared with previous ECG   Previous ECG: no previous ECG available  Rhythm: atrial fibrillation  Conduction: left bundle branch block  QRS axis: left  Other findings: non-specific ST-T wave changes    Clinical impression: abnormal EKG           Most recent echo as reviewed and interpreted by me:      Most recent stress test as reviewed and interpreted by me:  Results for orders placed during the hospital encounter of 04/19/22    Stress Test With Myocardial Perfusion (1 Day)    Interpretation Summary  · Left ventricular ejection fraction is borderline normal. (Calculated EF = 51%).  · Myocardial perfusion imaging indicates a located in the apex and septal wall.  · Resting images with anterior anteroseptal perfusion defect small to medium in size, moderate severity with summed rest score of 6, stress demonstrates worsening with moderate brad-infarct ischemia it would appear with now involvement of the inferoseptal wall, summed stress score of 15 with summed difference score of 9 EF 51% .  · Findings consistent with an equivocal ECG stress test.    Abnormal study  Left bundle branch with uninterpretable stress ECG  Pharmacologic study  No arrhythmias seen  No ST changes seen  Submaximal stress  Nuclear imaging demonstrates at rest a perfusion defect in the  anteroseptal wall small to medium in size moderate severity, stress induces worsening with at least moderate brad-infarct ischemia with additional involvement of the inferoseptal wall, summed difference score of 9, intermediate to high risk by criteria    Study concerning for ischemia in the septum and apex  EF 51%    Abnormal study      Most recent cardiac catheterization as reviewed interpreted by me:  Results for orders placed during the hospital encounter of 04/22/22    Cardiac Catheterization/Vascular Study    Narrative   Truong Ortiz MD, PhD  [facility]  Date of service 4-    Procedure  1.  Left heart catheterization with coronary angiography left ventriculography in MENESES position  2 FFR of the mid LAD with peripherally administered adenosine secondary to 60% bifurcation disease and abnormal stress test    Indication  Abnormal stress test  Dyspnea on exertion    After informed consent patient was brought to Cath Lab sterilely prepped and draped in usual fashion with exposure of the right wrist for right radial access via micropuncture modified surgery technique with placement of a 5/6 slender sheath, standard cocktail of heparin nitroglycerin and Cardene was administered via the sheath followed by 035 guidewire to the aortic valve followed by JL 3 for left coronary angiography, JR4 for right coronary angiography, JR4 was used across aortic valve followed by hand-injection for LV gram, EDP assessment and pullback assessment of the transaortic valve gradient.  Secondary to moderate disease of the mid LAD at the bifurcation with acute angle takeoff of the diagonal branch and abnormal stress test decision was made for hemodynamic assessment.  Patient was heparinized with 100 units/kg of heparin for ACT greater than 250.  A 6 [place] EBU 3.5 guide was used engage left main followed by fractional flow reserve via standard technique with equalization of the left main and  peripherally administered adenosine at 140 mcg/kg/min, minimum FFR value of 0.91 which was insignificant hemodynamically despite 60% bifurcation disease but in a very large caliber portion of the vessel.  Pullback revealed no significant drift and then all catheters and equipment removed.  Patient had no complications and left the Cath Lab chest pain-free hemodynamically electrically stable alert talking to staff neurologically grossly intact bilaterally with TR band    Findings  1.  Opening aortic pressure 134/84  2.  Closing pressure was unchanged  3.  LVEDP 12-14  4.  Normal LV systolic function 60%  5.  No transaortic valve gradient seen on pullback    Complications none  Blood loss less than 5 cc  Contrast 95 cc  Moderate conscious sedation time of 45 minutes with IV Versed and fentanyl administered by registered nurse with complete ECG pulse oximetry and hemodynamic monitoring throughout the entirety the case observed by me    Angiography  1 left main large-caliber vessel no disease  2.  LAD is a large-caliber vessel coursing to and around the apex with a large diagonal branch in the midportion, the proximal diagonal branch is essentially a ramus intermedius, proximal LAD diffusely medical irregularities 10% maximally, there is a mid to distal takeoff of a sizable diagonal branch at least 3 mm in diameter with acute angle takeoff, this bifurcation has 50 to 60% stenosis involving lara 1/1/1 classification with inflow LAD as well as both continuation LAD and ostial diagonal locations by definition.  Distally there is only luminal irregularities less than 10%  3.  Circumflex is a large-caliber codominant vessel with large PLV branch, there is no angiographic disease of any significance with only mild luminal irregularities less than 10%, obtuse marginal branch is a least 4 mm in diameter and trifurcates along the lateral wall  4.  RCA is a large-caliber codominant vessel, there is diffuse luminal regularities  throughout 20% with PDA that is widely patent but there is slow flow indicative of possibly some microvascular dysfunction.  There is no obstructive CAD    Conclusions and recommendations  1.  Diffuse luminary  irregularities throughout all coronaries most severe in the RCA, moderate disease in the mid LAD 50 to 60% at the bifurcation of the diagonal branch in the midportion, negative FFR at 0.91 not hemodynamically significant and can be treated medically  2.  Preserved LVEF, normal transaortic valve gradient, normal LVEDP    Secondary prevention goals for CAD, diet exercise cholesterol reduction to try to minimize and reduce plaque burden    Patient be discharged once discharge criteria met today in follow-up as outpatient    Truong Ortiz MD, PhD    The following portions of the patient's history were reviewed and updated as appropriate: allergies, current medications, past family history, past medical history, past social history, past surgical history, and problem list.      ROS:  14 point review of systems negative except as mentioned above    Current Outpatient Medications:     aspirin 81 MG chewable tablet, Chew 1 tablet Daily., Disp: , Rfl:     dilTIAZem (TIAZAC) 360 MG 24 hr capsule, Take 1 capsule by mouth Daily., Disp: 90 capsule, Rfl: 0    finasteride (PROSCAR) 5 MG tablet, Take 1 tablet by mouth Daily., Disp: , Rfl:     meloxicam (MOBIC) 15 MG tablet, Take 1 tablet by mouth Daily., Disp: , Rfl:     rosuvastatin (CRESTOR) 20 MG tablet, TAKE 1 TABLET BY MOUTH EVERY DAY, Disp: 90 tablet, Rfl: 0    vitamin D3 125 MCG (5000 UT) capsule capsule, Take 1 capsule by mouth Daily., Disp: , Rfl:     rivaroxaban (XARELTO) 20 MG tablet, Take 1 tablet by mouth Daily., Disp: 30 tablet, Rfl: 5    Problem List:  Patient Active Problem List   Diagnosis    Gastroenteritis, acute    Mixed hyperlipidemia    Essential hypertension    Left bundle branch block    Low back pain    Obesity    Paroxysmal atrial fibrillation     Abnormal stress test     Past Medical History:  Past Medical History:   Diagnosis Date    Atrial fibrillation     Hyperlipidemia     Hypertension      Past Surgical History:  Past Surgical History:   Procedure Laterality Date    CARDIAC CATHETERIZATION N/A 4/22/2022    Procedure: Left Heart Cath;  Surgeon: Truong Ortiz MD;  Location: [facility] CATH INVASIVE LOCATION;  Service: Cardiology;  Laterality: N/A;     Social History:  Social History     Socioeconomic History    Marital status:    Tobacco Use    Smoking status: Never    Smokeless tobacco: Never   Vaping Use    Vaping Use: Never used   Substance and Sexual Activity    Alcohol use: Yes     Alcohol/week: 6.0 standard drinks of alcohol     Types: 6 Cans of beer per week     Comment: a year     Drug use: Never    Sexual activity: Defer     Allergies:  No Known Allergies  Immunizations:  Immunization History   Administered Date(s) Administered    COVID-19 (MODERNA) 1st,2nd,3rd Dose Monovalent 02/10/2021, 03/16/2021    Flu Vaccine Quad PF 6-35MO 09/24/2016, 10/20/2019    Fluad Quad 65+ 10/20/2020    Fluzone (or Fluarix & Flulaval for VFC) >6mos 10/20/2019    Hepatitis A 04/29/2018, 11/05/2018    Influenza Quad Vaccine (Inpatient) 10/28/2017    Influenza, Unspecified 01/08/2013, 11/24/2013, 10/24/2014, 11/05/2018    Pneumococcal Conjugate 13-Valent (PCV13) 09/24/2020    Zostavax 12/08/2016            In-Office Procedure(s):  No orders to display        ASCVD RIsk Score::  The ASCVD Risk score (Sloan DK, et al., 2019) failed to calculate for the following reasons:    Cannot find a previous HDL lab    Cannot find a previous total cholesterol lab    Imaging:    Results for orders placed in visit on 05/20/21    XR Humerus Left    Narrative  left humerus X-Ray  Indication: Postop ORIF humerus  AP and lateral  Findings: Healed fracture hardware in position  no bony lesion  Soft tissues normal  not examined joint spaces  Hardware appropriately positioned  yes      yes prior studies available for comparison.    X-RAY was ordered and reviewed by Jarrell Deng MD       Results for orders placed during the hospital encounter of 10/18/23    CT Angiogram Chest Pulmonary Embolism    Narrative  CT ANGIOGRAM CHEST PULMONARY EMBOLISM    Date of Exam: 10/18/2023 4:54 PM EDT    Indication: SHORTNESS OF BREATH.    Comparison: Chest radiograph 12/17/2016.    Technique: Axial CT images were obtained of the chest after the uneventful intravenous administration of iodinated contrast utilizing pulmonary embolism protocol.  Sagittal and coronal reconstructions were performed.  Automated exposure control and  iterative reconstruction methods were used.      Findings:  Thyroid unremarkable. No supraclavicular adenopathy. Aortic atherosclerotic disease without aneurysm. Dilated main pulmonary artery up to 4.1 cm suggestive of pulmonary arterial hypertension. Negative for pulmonary embolism. Mild cardiomegaly. Moderate  calcified coronary atherosclerotic disease. No pericardial effusion.    No suspicious adenopathy. Esophagus grossly unremarkable. Multiple gallstones noted. Thickened adrenal glands without measurable nodule. Degenerative osteoarthritis of right glenohumeral joint. Surgical changes in the left humerus. Diffuse idiopathic  skeletal hyperostosis. No acute displaced fracture or aggressive bone lesion.    Central airways patent. Mild nonspecific peribronchial thickening most notably in the lower lobes. Mild smooth interlobar septal thickening with trace right effusion. Negative for infectious infiltrate, suspicious nodule or pneumothorax.    Impression  Impression:  1. Negative for pulmonary embolism.  2. Dilated main pulmonary artery which can be seen with pulmonary arterial hypertension.  3. Cardiomegaly with mild interstitial edema and trace right effusion.  4. Moderate coronary atherosclerotic disease.  5. Cholelithiasis.        Electronically Signed: Madan Bishop  MD  10/18/2023 5:14 PM EDT  Workstation ID: [id number]      Results for orders placed during the hospital encounter of 10/18/23    CT Angiogram Chest Pulmonary Embolism    Narrative  CT ANGIOGRAM CHEST PULMONARY EMBOLISM    Date of Exam: 10/18/2023 4:54 PM EDT    Indication: SHORTNESS OF BREATH.    Comparison: Chest radiograph 12/17/2016.    Technique: Axial CT images were obtained of the chest after the uneventful intravenous administration of iodinated contrast utilizing pulmonary embolism protocol.  Sagittal and coronal reconstructions were performed.  Automated exposure control and  iterative reconstruction methods were used.      Findings:  Thyroid unremarkable. No supraclavicular adenopathy. Aortic atherosclerotic disease without aneurysm. Dilated main pulmonary artery up to 4.1 cm suggestive of pulmonary arterial hypertension. Negative for pulmonary embolism. Mild cardiomegaly. Moderate  calcified coronary atherosclerotic disease. No pericardial effusion.    No suspicious adenopathy. Esophagus grossly unremarkable. Multiple gallstones noted. Thickened adrenal glands without measurable nodule. Degenerative osteoarthritis of right glenohumeral joint. Surgical changes in the left humerus. Diffuse idiopathic  skeletal hyperostosis. No acute displaced fracture or aggressive bone lesion.    Central airways patent. Mild nonspecific peribronchial thickening most notably in the lower lobes. Mild smooth interlobar septal thickening with trace right effusion. Negative for infectious infiltrate, suspicious nodule or pneumothorax.    Impression  Impression:  1. Negative for pulmonary embolism.  2. Dilated main pulmonary artery which can be seen with pulmonary arterial hypertension.  3. Cardiomegaly with mild interstitial edema and trace right effusion.  4. Moderate coronary atherosclerotic disease.  5. Cholelithiasis.        Electronically Signed: Madan Bishop MD  10/18/2023 5:14 PM EDT  Workstation ID: [id number]      Lab  Review:   Hospital Outpatient Visit on 10/18/2023   Component Date Value    Creatinine 10/18/2023 1.30     eGFR 10/18/2023 59.8 (L)    Lab on 10/18/2023   Component Date Value    HS Troponin T 10/18/2023 20 (H)    Lab on 10/18/2023   Component Date Value    Glucose 10/18/2023 98     BUN 10/18/2023 24 (H)     Creatinine 10/18/2023 1.18     Sodium 10/18/2023 142     Potassium 10/18/2023 4.8     Chloride 10/18/2023 105     CO2 10/18/2023 29.0     Calcium 10/18/2023 9.7     Total Protein 10/18/2023 6.4     Albumin 10/18/2023 4.4     ALT (SGPT) 10/18/2023 30     AST (SGOT) 10/18/2023 22     Alkaline Phosphatase 10/18/2023 60     Total Bilirubin 10/18/2023 0.8     Globulin 10/18/2023 2.0     A/G Ratio 10/18/2023 2.2     BUN/Creatinine Ratio 10/18/2023 20.3     Anion Gap 10/18/2023 8.0     eGFR 10/18/2023 67.2     proBNP 10/18/2023 1,874.0 (H)     D-Dimer, Quantitative 10/18/2023 0.74 (H)     HS Troponin T 10/18/2023 21 (H)     WBC 10/18/2023 5.80     RBC 10/18/2023 4.47     Hemoglobin 10/18/2023 14.3     Hematocrit 10/18/2023 43.3     MCV 10/18/2023 96.9     MCH 10/18/2023 31.9     MCHC 10/18/2023 33.0     RDW 10/18/2023 14.2     RDW-SD 10/18/2023 47.3     MPV 10/18/2023 7.8     Platelets 10/18/2023 201     Neutrophil % 10/18/2023 50.9     Lymphocyte % 10/18/2023 39.8     Monocyte % 10/18/2023 8.1     Eosinophil % 10/18/2023 0.8     Basophil % 10/18/2023 0.4     Neutrophils, Absolute 10/18/2023 3.00     Lymphocytes, Absolute 10/18/2023 2.30     Monocytes, Absolute 10/18/2023 0.50     Eosinophils, Absolute 10/18/2023 0.00     Basophils, Absolute 10/18/2023 0.00     nRBC 10/18/2023 0.0      Recent labs reviewed and interpreted for clinical significance and application            Level of Care:           Truong Ortiz MD  11/02/23  .

## 2023-11-24 ENCOUNTER — HOSPITAL ENCOUNTER (OUTPATIENT)
Dept: NUCLEAR MEDICINE | Facility: HOSPITAL | Age: 68
Discharge: HOME | End: 2023-11-24
Payer: MEDICARE

## 2023-11-24 ENCOUNTER — HOSPITAL ENCOUNTER (OUTPATIENT)
Dept: CARDIOLOGY | Facility: HOSPITAL | Age: 68
Discharge: HOME | End: 2023-11-24
Payer: MEDICARE

## 2023-11-24 VITALS
SYSTOLIC BLOOD PRESSURE: 136 MMHG | BODY MASS INDEX: 33.46 KG/M2 | WEIGHT: 247 LBS | DIASTOLIC BLOOD PRESSURE: 83 MMHG | HEIGHT: 72 IN

## 2023-11-24 DIAGNOSIS — I48.0 PAROXYSMAL ATRIAL FIBRILLATION: ICD-10-CM

## 2023-11-24 DIAGNOSIS — R06.02 SHORTNESS OF BREATH: ICD-10-CM

## 2023-11-24 PROCEDURE — 78452 HT MUSCLE IMAGE SPECT MULT: CPT

## 2023-11-24 PROCEDURE — 25010000002 SULFUR HEXAFLUORIDE MICROSPH 60.7-25 MG RECONSTITUTED SUSPENSION: Performed by: INTERNAL MEDICINE

## 2023-11-24 PROCEDURE — A9502 TC99M TETROFOSMIN: HCPCS | Performed by: INTERNAL MEDICINE

## 2023-11-24 PROCEDURE — 93017 CV STRESS TEST TRACING ONLY: CPT

## 2023-11-24 PROCEDURE — 93306 TTE W/DOPPLER COMPLETE: CPT

## 2023-11-24 PROCEDURE — 0 TECHNETIUM TETROFOSMIN KIT: Performed by: INTERNAL MEDICINE

## 2023-11-24 RX ORDER — REGADENOSON 0.08 MG/ML
0.4 INJECTION, SOLUTION INTRAVENOUS
Status: DISCONTINUED | OUTPATIENT
Start: 2023-11-24 | End: 2023-11-25 | Stop reason: HOSPADM

## 2023-11-24 RX ADMIN — SULFUR HEXAFLUORIDE 2 ML: KIT at 11:11

## 2023-11-24 RX ADMIN — TETROFOSMIN 1 DOSE: 1.38 INJECTION, POWDER, LYOPHILIZED, FOR SOLUTION INTRAVENOUS at 09:39

## 2023-11-27 LAB
BH CV ECHO MEAS - ACS: 2.14 CM
BH CV ECHO MEAS - AO MAX PG: 5.5 MMHG
BH CV ECHO MEAS - AO MEAN PG: 3.1 MMHG
BH CV ECHO MEAS - AO ROOT DIAM: 3.5 CM
BH CV ECHO MEAS - AO V2 MAX: 116.7 CM/SEC
BH CV ECHO MEAS - AO V2 VTI: 20.1 CM
BH CV ECHO MEAS - AVA(I,D): 2.44 CM2
BH CV ECHO MEAS - EDV(CUBED): 144 ML
BH CV ECHO MEAS - EDV(MOD-SP4): 149.6 ML
BH CV ECHO MEAS - EF(MOD-BP): 16 %
BH CV ECHO MEAS - EF(MOD-SP4): 15.9 %
BH CV ECHO MEAS - ESV(CUBED): 90.8 ML
BH CV ECHO MEAS - ESV(MOD-SP4): 125.9 ML
BH CV ECHO MEAS - FS: 14.2 %
BH CV ECHO MEAS - IVS/LVPW: 1 CM
BH CV ECHO MEAS - IVSD: 0.99 CM
BH CV ECHO MEAS - LV DIASTOLIC VOL/BSA (35-75): 64.2 CM2
BH CV ECHO MEAS - LV MASS(C)D: 193.3 GRAMS
BH CV ECHO MEAS - LV MAX PG: 3.1 MMHG
BH CV ECHO MEAS - LV MEAN PG: 1.75 MMHG
BH CV ECHO MEAS - LV SYSTOLIC VOL/BSA (12-30): 54 CM2
BH CV ECHO MEAS - LV V1 MAX: 87.8 CM/SEC
BH CV ECHO MEAS - LV V1 VTI: 16 CM
BH CV ECHO MEAS - LVIDD: 5.2 CM
BH CV ECHO MEAS - LVIDS: 4.5 CM
BH CV ECHO MEAS - LVOT AREA: 3.1 CM2
BH CV ECHO MEAS - LVOT DIAM: 1.98 CM
BH CV ECHO MEAS - LVPWD: 0.99 CM
BH CV ECHO MEAS - MR MAX PG: 73.7 MMHG
BH CV ECHO MEAS - MR MAX VEL: 429.3 CM/SEC
BH CV ECHO MEAS - MV E MAX VEL: 81.4 CM/SEC
BH CV ECHO MEAS - PA ACC TIME: 0.1 SEC
BH CV ECHO MEAS - PA V2 MAX: 112.8 CM/SEC
BH CV ECHO MEAS - RV MAX PG: 3.2 MMHG
BH CV ECHO MEAS - RV V1 MAX: 89.9 CM/SEC
BH CV ECHO MEAS - RV V1 VTI: 17.5 CM
BH CV ECHO MEAS - RVDD: 5.1 CM
BH CV ECHO MEAS - SI(MOD-SP4): 10.2 ML/M2
BH CV ECHO MEAS - SV(LVOT): 49.2 ML
BH CV ECHO MEAS - SV(MOD-SP4): 23.7 ML
BH CV ECHO MEAS - TR MAX PG: 34.9 MMHG
BH CV ECHO MEAS - TR MAX VEL: 294.6 CM/SEC
BH CV REST NUCLEAR ISOTOPE DOSE: 10 MCI
BH CV STRESS BP STAGE 1: NORMAL
BH CV STRESS COMMENTS STAGE 1: NORMAL
BH CV STRESS DOSE REGADENOSON STAGE 1: 0.4
BH CV STRESS DURATION MIN STAGE 1: 0
BH CV STRESS DURATION SEC STAGE 1: 10
BH CV STRESS HR STAGE 1: 99
BH CV STRESS NUCLEAR ISOTOPE DOSE: 33 MCI
BH CV STRESS PROTOCOL 1: NORMAL
BH CV STRESS RECOVERY BP: NORMAL MMHG
BH CV STRESS RECOVERY HR: 110 BPM
BH CV STRESS STAGE 1: 1
LV EF NUC BP: 22 %
MAXIMAL PREDICTED HEART RATE: 152 BPM
PERCENT MAX PREDICTED HR: 84.21 %
STRESS BASELINE BP: NORMAL MMHG
STRESS BASELINE HR: 98 BPM
STRESS PERCENT HR: 99 %
STRESS POST PEAK BP: NORMAL MMHG
STRESS POST PEAK HR: 128 BPM
STRESS TARGET HR: 129 BPM

## 2023-11-30 ENCOUNTER — OFFICE VISIT (OUTPATIENT)
Dept: CARDIOLOGY | Facility: CLINIC | Age: 68
End: 2023-11-30
Payer: MEDICARE

## 2023-11-30 VITALS
HEIGHT: 72 IN | HEART RATE: 86 BPM | BODY MASS INDEX: 34.54 KG/M2 | WEIGHT: 255 LBS | DIASTOLIC BLOOD PRESSURE: 96 MMHG | RESPIRATION RATE: 18 BRPM | SYSTOLIC BLOOD PRESSURE: 143 MMHG

## 2023-11-30 DIAGNOSIS — R07.89 CHEST PAIN, ATYPICAL: ICD-10-CM

## 2023-11-30 DIAGNOSIS — R94.39 ABNORMAL STRESS TEST: Primary | ICD-10-CM

## 2023-11-30 PROCEDURE — 3077F SYST BP >= 140 MM HG: CPT | Performed by: INTERNAL MEDICINE

## 2023-11-30 PROCEDURE — 3080F DIAST BP >= 90 MM HG: CPT | Performed by: INTERNAL MEDICINE

## 2023-11-30 PROCEDURE — 99214 OFFICE O/P EST MOD 30 MIN: CPT | Performed by: INTERNAL MEDICINE

## 2023-11-30 NOTE — H&P (VIEW-ONLY)
Cardiology Clinic Note  Truong Ortiz MD, PhD    Subjective:     Encounter Date:11/30/2023      Patient ID: Dillon Aaron is a 67 y.o. male.    Chief Complaint:  Chief Complaint   Patient presents with    Follow-up       HPI:        Previously I had the pleasure to see this very pleasant 67-year-old gentleman in clinic.    He has history of left bundle branch block as well as essential hypertension and paroxysmal atrial fibrillation.  He had previous abnormal stress test ordered for significant dyspnea on exertion and atypical symptoms which demonstrated ischemia in the septum.   He underwent invasive ischemic evaluation demonstrating 60% mid LAD disease compromising ostial proximal diagonal and the continuation LAD, FFR revealed borderline hemodynamic status with 0.91 value not meeting criteria for intervention, this is been managed medically, he was cleared for hip surgery and completed this successfully..   He has been followed intermittently with paroxysmal nature of his atrial fibrillation as well as CV comorbidities and nonobstructive CAD.       He presents back ultimately with recurrent atrial fibrillation today with underlying left bundle branch block left axis deviation with some reports of chest discomfort and dyspnea on exertion.  We discussed need for long-term anticoagulation with [id number] score of 3, lipid-lowering therapy, afterload reduction, heart rate control, secondary prevention and reevaluation of his coronary anatomy.  Work-up revealed abnormal stress test with anterior apical and septal reversibility concerning for progression of LAD and diagonal disease previously seen last year, he is having anginal chest pain that is stuttering in quality.  He is more short of breath.  We discussed abnormal stress test and echo with newly reduced EF of 20% compared to 50% last year with need for invasive ischemic evaluation to evaluate LAD diagonal which she is agreeable for.  Also EP evaluation for more  persistent atrial fibrillation which may be contributing to reduced EF in addition to left bundle branch block and dyssynchrony     Diet exercise per AHA guidelines recommended  Mediterranean diet low-cholesterol     EKG atrial fibrillation left bundle branch block today     Review of systems negative for 14 point review of systems except as mentioned above     Historical data copied forward from previous encounters in EMR including the history, exam, and assessment/plan has been reviewed and is unchanged unless noted otherwise.     Cardiac medicines reviewed with risk, benefits, and necessity of each discussed.     Risk and benefit of cardiac catheterization i reviewed including death heart attack stroke pain bleeding infection need for vascular /cardiovascular surgery were discussed and the patient         Reviewed below     Physical Exam  Regular rate and rhythm no rubs murmurs gallops  No heave no lift  No clubbing cyanosis or edema  Clear to auscultation  Normocephalic atraumatic pupils equal round  Intact grossly  Soft nontender nondistended  Normal pulses normal cap refill  No carotid bruits or JVD  Unchanged from prior encounter     Assessment:       1. Abnormal stress test (Primary)  Coronary artery disease  Cardiomyopathy with newly reduced EF 20% compared to 50% previously last year  FFR the LAD is 0.91 April 2022  Nonobstructive disease in the circumflex and RCA at that time  Recurrence of more unstable symptoms with exertional chest pain dyspnea on exertion  Abnormal stress test with reversibility at the apex and septum  New reduced ejection fraction of 20%, previously 50% which may be multifactorial with A-fib and tachycardias cardiomyopathy versus ischemic cardiomyopathy which is unclear at this point    Left heart catheterization with coronary angiography for definitive valuation of LAD and diagonal which were borderline last year with newly reduced EF    Return of atrial fibrillation now more  "persistent, new decline in EF with left bundle branch block  Ask EP to see the patient for A-fib ablation and restoration of sinus rhythm, consideration for resynchronization therapy potentially with biventricular device if indicated at that time depending on further work-up     #2 dyspnea on exertion, return of atrial fibrillation  Depressed EF  Clinical heart failure with reduced EF 20% by recent 2D echo     3. Paroxysmal atrial fibrillation (HCC)  Xarelto 20 daily with chads Vascor of 3  diltiazem on board  Remains off beta-blockers       Hyperlipidemia and nonobstructive CAD, continue high intensity statin Crestor to 20 daily, correlate with lipid studies to meet goals per guidelines      Further recommendations follow invasive ischemic evaluation given cardiomyopathy of new onset    Truong Ortiz MD, PhD    Objective:         /96 (BP Location: Left arm, Patient Position: Sitting)   Pulse 86   Resp 18   Ht 182.9 cm (72\")   Wt 116 kg (255 lb)   BMI 34.58 kg/m²   Diagnoses and all orders for this visit:    1. Abnormal stress test (Primary)  Overview:  Added automatically from request for surgery [id number]    Orders:  -     Case Request Cath Lab: Left Heart Cath  -     CBC & Differential  -     Comprehensive Metabolic Panel  -     Protime-INR    2. Chest pain, atypical  -     Case Request Cath Lab: Left Heart Cath  -     CBC & Differential  -     Comprehensive Metabolic Panel  -     Protime-INR            The pleasure to be involved in this patient's cardiovascular care.  Please call with any questions or concerns  Truong Ortiz MD, PhD    Most recent EKG as reviewed and interpreted by me:  Procedures     Most recent echo as reviewed and interpreted by me:  Results for orders placed during the hospital encounter of 11/24/23    Adult Transthoracic Echo Complete W/ Color, Spectral and Contrast if Necessary Per Protocol    Interpretation Summary    The left ventricular cavity is moderately dilated.    Left " ventricular diastolic function is consistent with (grade II w/high LAP) pseudonormalization.    The right ventricular cavity is borderline dilated.    Left atrial volume is mildly increased.    Estimated right ventricular systolic pressure from tricuspid regurgitation is mildly elevated (35-45 mmHg).    Mild pulmonary hypertension is present.    Regional abnormalities as described  EF 15 to 20% severely reduced  [id number] variability with bundle branch block as well as underlying atrial fibrillation      Most recent stress test as reviewed and interpreted by me:  Results for orders placed during the hospital encounter of 11/24/23    Stress Test With Myocardial Perfusion One Day    Interpretation Summary    Left ventricular ejection fraction is severely reduced (Calculated EF = 22%).    Abnormal LV wall motion consistent with severe hypokinesis.    Myocardial perfusion imaging indicates a moderate-sized infarct located in the anterior wall, septal wall and apex with moderate brad-infarct ischemia.    Impressions are consistent with a high risk study.    There is no prior study available for comparison. Resting and stress images were compared Resting images demonstrate decreased counts in the mid to apical anterior wall and anteroseptum consistent with prior MI Stress images demonstrate worsening of brad-infarct ischemia at least moderate in the anteroseptum, anterior wall apex and inferoseptum.  EF 22%, dilated ventricle  mL Abnormal study.    Findings consistent with an equivocal ECG stress test.    Abnormal study  Moderate brad-infarct ischemia in the septum anterior wall and apex  Severely reduced EF 22%  Underlying atrial fibrillation with left bundle branch block  Dilated ventricle  Intermediate/ high risk study      Most recent cardiac catheterization as reviewed interpreted by me:  Results for orders placed during the hospital encounter of 04/22/22    Cardiac Catheterization/Vascular  Study    Narrative   Truong Ortiz MD, PhD  [facility] cardiology  Date of service 4-    Procedure  1.  Left heart catheterization with coronary angiography left ventriculography in MENESES position  2 FFR of the mid LAD with peripherally administered adenosine secondary to 60% bifurcation disease and abnormal stress test    Indication  Abnormal stress test  Dyspnea on exertion    After informed consent patient was brought to Cath Lab sterilely prepped and draped in usual fashion with exposure of the right wrist for right radial access via micropuncture modified surgery technique with placement of a 5/6 slender sheath, standard cocktail of heparin nitroglycerin and Cardene was administered via the sheath followed by 035 guidewire to the aortic valve followed by JL 3 for left coronary angiography, JR4 for right coronary angiography, JR4 was used across aortic valve followed by hand-injection for LV gram, EDP assessment and pullback assessment of the transaortic valve gradient.  Secondary to moderate disease of the mid LAD at the bifurcation with acute angle takeoff of the diagonal branch and abnormal stress test decision was made for hemodynamic assessment.  Patient was heparinized with 100 units/kg of heparin for ACT greater than 250.  A 6 Czech EBU 3.5 guide was used engage left main followed by fractional flow reserve via standard technique with equalization of the left main and peripherally administered adenosine at 140 mcg/kg/min, minimum FFR value of 0.91 which was insignificant hemodynamically despite 60% bifurcation disease but in a very large caliber portion of the vessel.  Pullback revealed no significant drift and then all catheters and equipment removed.  Patient had no complications and left the Cath Lab chest pain-free hemodynamically electrically stable alert talking to staff neurologically grossly intact bilaterally with TR band    Findings  1.  Opening aortic pressure  134/84  2.  Closing pressure was unchanged  3.  LVEDP 12-14  4.  Normal LV systolic function 60%  5.  No transaortic valve gradient seen on pullback    Complications none  Blood loss less than 5 cc  Contrast 95 cc  Moderate conscious sedation time of 45 minutes with IV Versed and fentanyl administered by registered nurse with complete ECG pulse oximetry and hemodynamic monitoring throughout the entirety the case observed by me    Angiography  1 left main large-caliber vessel no disease  2.  LAD is a large-caliber vessel coursing to and around the apex with a large diagonal branch in the midportion, the proximal diagonal branch is essentially a ramus intermedius, proximal LAD diffusely medical irregularities 10% maximally, there is a mid to distal takeoff of a sizable diagonal branch at least 3 mm in diameter with acute angle takeoff, this bifurcation has 50 to 60% stenosis involving lara 1/1/1 classification with inflow LAD as well as both continuation LAD and ostial diagonal locations by definition.  Distally there is only luminal irregularities less than 10%  3.  Circumflex is a large-caliber codominant vessel with large PLV branch, there is no angiographic disease of any significance with only mild luminal irregularities less than 10%, obtuse marginal branch is a least 4 mm in diameter and trifurcates along the lateral wall  4.  RCA is a large-caliber codominant vessel, there is diffuse luminal regularities throughout 20% with PDA that is widely patent but there is slow flow indicative of possibly some microvascular dysfunction.  There is no obstructive CAD    Conclusions and recommendations  1.  Diffuse luminary  irregularities throughout all coronaries most severe in the RCA, moderate disease in the mid LAD 50 to 60% at the bifurcation of the diagonal branch in the midportion, negative FFR at 0.91 not hemodynamically significant and can be treated medically  2.  Preserved LVEF, normal transaortic valve  gradient, normal LVEDP    Secondary prevention goals for CAD, diet exercise cholesterol reduction to try to minimize and reduce plaque burden    Patient be discharged once discharge criteria met today in follow-up as outpatient    Truong Ortiz MD, PhD    The following portions of the patient's history were reviewed and updated as appropriate: allergies, current medications, past family history, past medical history, past social history, past surgical history, and problem list.      ROS:  14 point review of systems negative except as mentioned above    Current Outpatient Medications:     aspirin 81 MG chewable tablet, Chew 1 tablet Daily., Disp: , Rfl:     dilTIAZem (TIAZAC) 360 MG 24 hr capsule, Take 1 capsule by mouth Daily., Disp: 90 capsule, Rfl: 0    finasteride (PROSCAR) 5 MG tablet, Take 1 tablet by mouth Daily., Disp: , Rfl:     meloxicam (MOBIC) 15 MG tablet, Take 1 tablet by mouth Daily., Disp: , Rfl:     rivaroxaban (XARELTO) 20 MG tablet, Take 1 tablet by mouth Daily., Disp: 30 tablet, Rfl: 5    rosuvastatin (CRESTOR) 20 MG tablet, TAKE 1 TABLET BY MOUTH EVERY DAY, Disp: 90 tablet, Rfl: 0    vitamin D3 125 MCG (5000 UT) capsule capsule, Take 1 capsule by mouth Daily., Disp: , Rfl:     Problem List:  Patient Active Problem List   Diagnosis    Gastroenteritis, acute    Mixed hyperlipidemia    Essential hypertension    Left bundle branch block    Low back pain    Obesity    Paroxysmal atrial fibrillation    Abnormal stress test    Chest pain, atypical     Past Medical History:  Past Medical History:   Diagnosis Date    Atrial fibrillation     Hyperlipidemia     Hypertension      Past Surgical History:  Past Surgical History:   Procedure Laterality Date    CARDIAC CATHETERIZATION N/A 4/22/2022    Procedure: Left Heart Cath;  Surgeon: Truong Ortiz MD;  Location: [facility] CATH INVASIVE LOCATION;  Service: Cardiology;  Laterality: N/A;     Social History:  Social History     Socioeconomic History     Marital status:    Tobacco Use    Smoking status: Never    Smokeless tobacco: Never   Vaping Use    Vaping Use: Never used   Substance and Sexual Activity    Alcohol use: Yes     Alcohol/week: 6.0 standard drinks of alcohol     Types: 6 Cans of beer per week     Comment: a year     Drug use: Never    Sexual activity: Defer     Allergies:  No Known Allergies  Immunizations:  Immunization History   Administered Date(s) Administered    COVID-19 (MODERNA) 1st,2nd,3rd Dose Monovalent 02/10/2021, 03/16/2021    Flu Vaccine Quad PF 6-35MO 09/24/2016, 10/20/2019    Fluad Quad 65+ 10/20/2020    Fluzone (or Fluarix & Flulaval for VFC) >6mos 10/20/2019    Hepatitis A 04/29/2018, 11/05/2018    Influenza Quad Vaccine (Inpatient) 10/28/2017    Influenza, Unspecified 01/08/2013, 11/24/2013, 10/24/2014, 11/05/2018    Pneumococcal Conjugate 13-Valent (PCV13) 09/24/2020    Zostavax 12/08/2016            In-Office Procedure(s):  No orders to display        ASCVD RIsk Score::  The ASCVD Risk score (Sloan DK, et al., 2019) failed to calculate for the following reasons:    The patient has a prior MI or stroke diagnosis    Imaging:    Results for orders placed in visit on 05/20/21    XR Humerus Left    Narrative  left humerus X-Ray  Indication: Postop ORIF humerus  AP and lateral  Findings: Healed fracture hardware in position  no bony lesion  Soft tissues normal  not examined joint spaces  Hardware appropriately positioned yes      yes prior studies available for comparison.    X-RAY was ordered and reviewed by Jarrell Deng MD       Results for orders placed during the hospital encounter of 10/18/23    CT Angiogram Chest Pulmonary Embolism    Narrative  CT ANGIOGRAM CHEST PULMONARY EMBOLISM    Date of Exam: 10/18/2023 4:54 PM EDT    Indication: SHORTNESS OF BREATH.    Comparison: Chest radiograph 12/17/2016.    Technique: Axial CT images were obtained of the chest after the uneventful intravenous administration of iodinated  contrast utilizing pulmonary embolism protocol.  Sagittal and coronal reconstructions were performed.  Automated exposure control and  iterative reconstruction methods were used.      Findings:  Thyroid unremarkable. No supraclavicular adenopathy. Aortic atherosclerotic disease without aneurysm. Dilated main pulmonary artery up to 4.1 cm suggestive of pulmonary arterial hypertension. Negative for pulmonary embolism. Mild cardiomegaly. Moderate  calcified coronary atherosclerotic disease. No pericardial effusion.    No suspicious adenopathy. Esophagus grossly unremarkable. Multiple gallstones noted. Thickened adrenal glands without measurable nodule. Degenerative osteoarthritis of right glenohumeral joint. Surgical changes in the left humerus. Diffuse idiopathic  skeletal hyperostosis. No acute displaced fracture or aggressive bone lesion.    Central airways patent. Mild nonspecific peribronchial thickening most notably in the lower lobes. Mild smooth interlobar septal thickening with trace right effusion. Negative for infectious infiltrate, suspicious nodule or pneumothorax.    Impression  Impression:  1. Negative for pulmonary embolism.  2. Dilated main pulmonary artery which can be seen with pulmonary arterial hypertension.  3. Cardiomegaly with mild interstitial edema and trace right effusion.  4. Moderate coronary atherosclerotic disease.  5. Cholelithiasis.        Electronically Signed: Madan Bishop MD  10/18/2023 5:14 PM EDT  Workstation ID: [id number]      Results for orders placed during the hospital encounter of 10/18/23    CT Angiogram Chest Pulmonary Embolism    Narrative  CT ANGIOGRAM CHEST PULMONARY EMBOLISM    Date of Exam: 10/18/2023 4:54 PM EDT    Indication: SHORTNESS OF BREATH.    Comparison: Chest radiograph 12/17/2016.    Technique: Axial CT images were obtained of the chest after the uneventful intravenous administration of iodinated contrast utilizing pulmonary embolism protocol.  Sagittal and  coronal reconstructions were performed.  Automated exposure control and  iterative reconstruction methods were used.      Findings:  Thyroid unremarkable. No supraclavicular adenopathy. Aortic atherosclerotic disease without aneurysm. Dilated main pulmonary artery up to 4.1 cm suggestive of pulmonary arterial hypertension. Negative for pulmonary embolism. Mild cardiomegaly. Moderate  calcified coronary atherosclerotic disease. No pericardial effusion.    No suspicious adenopathy. Esophagus grossly unremarkable. Multiple gallstones noted. Thickened adrenal glands without measurable nodule. Degenerative osteoarthritis of right glenohumeral joint. Surgical changes in the left humerus. Diffuse idiopathic  skeletal hyperostosis. No acute displaced fracture or aggressive bone lesion.    Central airways patent. Mild nonspecific peribronchial thickening most notably in the lower lobes. Mild smooth interlobar septal thickening with trace right effusion. Negative for infectious infiltrate, suspicious nodule or pneumothorax.    Impression  Impression:  1. Negative for pulmonary embolism.  2. Dilated main pulmonary artery which can be seen with pulmonary arterial hypertension.  3. Cardiomegaly with mild interstitial edema and trace right effusion.  4. Moderate coronary atherosclerotic disease.  5. Cholelithiasis.        Electronically Signed: Madan Bishop MD  10/18/2023 5:14 PM EDT  Workstation ID: [id number]      Lab Review:   Hospital Outpatient Visit on 11/24/2023   Component Date Value    BH CV STRESS PROTOCOL 1 11/24/2023 Pharmacologic     Stage 1 11/24/2023 1.0     HR Stage 1 11/24/2023 99     BP Stage 1 11/24/2023 116/84     Duration Min Stage 1 11/24/2023 0     Duration Sec Stage 1 11/24/2023 10     Stress Dose Regadenoson * 11/24/2023 0.40     Stress Comments Stage 1 11/24/2023 10 sec bolus injection     Baseline HR 11/24/2023 98     Baseline BP 11/24/2023 113/79     Peak HR 11/24/2023 128     Peak BP 11/24/2023  134/83     Recovery HR 11/24/2023 110     Recovery BP 11/24/2023 117/80     Target HR (85%) 11/24/2023 129     Max. Pred. HR (100%) 11/24/2023 152     Percent Max Pred HR 11/24/2023 84.21     Percent Target HR 11/24/2023 99     BH CV REST NUCLEAR ISOTO* 11/24/2023 10.0     BH CV STRESS NUCLEAR ISO* 11/24/2023 33.0     Nuc Stress EF 11/24/2023 22    Hospital Outpatient Visit on 11/24/2023   Component Date Value    LVIDd 11/24/2023 5.2     LVIDs 11/24/2023 4.5     IVSd 11/24/2023 0.99     LVPWd 11/24/2023 0.99     FS 11/24/2023 14.2     IVS/LVPW 11/24/2023 1.00     ESV(cubed) 11/24/2023 90.8     LV Sys Vol (BSA correcte* 11/24/2023 54.0     EDV(cubed) 11/24/2023 144.0     LV Wright Vol (BSA correct* 11/24/2023 64.2     LV mass(C)d 11/24/2023 193.3     LVOT area 11/24/2023 3.1     LVOT diam 11/24/2023 1.98     EDV(MOD-sp4) 11/24/2023 149.6     ESV(MOD-sp4) 11/24/2023 125.9     SV(MOD-sp4) 11/24/2023 23.7     SI(MOD-sp4) 11/24/2023 10.2     EF(MOD-sp4) 11/24/2023 15.9     MV E max belia 11/24/2023 81.4     SV(LVOT) 11/24/2023 49.2     RVIDd 11/24/2023 5.1     LV V1 max 11/24/2023 87.8     LV V1 max PG 11/24/2023 3.1     LV V1 mean PG 11/24/2023 1.75     LV V1 VTI 11/24/2023 16.0     Ao pk belia 11/24/2023 116.7     Ao max PG 11/24/2023 5.5     Ao mean PG 11/24/2023 3.1     Ao V2 VTI 11/24/2023 20.1     CHEKO(I,D) 11/24/2023 2.44     MR max belia 11/24/2023 429.3     MR max PG 11/24/2023 73.7     TR max belia 11/24/2023 294.6     TR max PG 11/24/2023 34.9     RV V1 max PG 11/24/2023 3.2     RV V1 max 11/24/2023 89.9     RV V1 VTI 11/24/2023 17.5     PA V2 max 11/24/2023 112.8     PA acc time 11/24/2023 0.10     Ao root diam 11/24/2023 3.5     ACS 11/24/2023 2.14     EF(MOD-bp) 11/24/2023 16.0    Hospital Outpatient Visit on 10/18/2023   Component Date Value    Creatinine 10/18/2023 1.30     eGFR 10/18/2023 59.8 (L)    Lab on 10/18/2023   Component Date Value    HS Troponin T 10/18/2023 20 (H)    Lab on 10/18/2023   Component Date  Value    Glucose 10/18/2023 98     BUN 10/18/2023 24 (H)     Creatinine 10/18/2023 1.18     Sodium 10/18/2023 142     Potassium 10/18/2023 4.8     Chloride 10/18/2023 105     CO2 10/18/2023 29.0     Calcium 10/18/2023 9.7     Total Protein 10/18/2023 6.4     Albumin 10/18/2023 4.4     ALT (SGPT) 10/18/2023 30     AST (SGOT) 10/18/2023 22     Alkaline Phosphatase 10/18/2023 60     Total Bilirubin 10/18/2023 0.8     Globulin 10/18/2023 2.0     A/G Ratio 10/18/2023 2.2     BUN/Creatinine Ratio 10/18/2023 20.3     Anion Gap 10/18/2023 8.0     eGFR 10/18/2023 67.2     proBNP 10/18/2023 1,874.0 (H)     D-Dimer, Quantitative 10/18/2023 0.74 (H)     HS Troponin T 10/18/2023 21 (H)     WBC 10/18/2023 5.80     RBC 10/18/2023 4.47     Hemoglobin 10/18/2023 14.3     Hematocrit 10/18/2023 43.3     MCV 10/18/2023 96.9     MCH 10/18/2023 31.9     MCHC 10/18/2023 33.0     RDW 10/18/2023 14.2     RDW-SD 10/18/2023 47.3     MPV 10/18/2023 7.8     Platelets 10/18/2023 201     Neutrophil % 10/18/2023 50.9     Lymphocyte % 10/18/2023 39.8     Monocyte % 10/18/2023 8.1     Eosinophil % 10/18/2023 0.8     Basophil % 10/18/2023 0.4     Neutrophils, Absolute 10/18/2023 3.00     Lymphocytes, Absolute 10/18/2023 2.30     Monocytes, Absolute 10/18/2023 0.50     Eosinophils, Absolute 10/18/2023 0.00     Basophils, Absolute 10/18/2023 0.00     nRBC 10/18/2023 0.0      Recent labs reviewed and interpreted for clinical significance and application            Level of Care:           Truong Ortiz MD  11/30/23  .

## 2023-11-30 NOTE — PROGRESS NOTES
Cardiology Clinic Note  Truong Ortiz MD, PhD    Subjective:     Encounter Date:11/30/2023      Patient ID: Dillon Aaron is a 67 y.o. male.    Chief Complaint:  Chief Complaint   Patient presents with    Follow-up       HPI:        Previously I had the pleasure to see this very pleasant 67-year-old gentleman in clinic.    He has history of left bundle branch block as well as essential hypertension and paroxysmal atrial fibrillation.  He had previous abnormal stress test ordered for significant dyspnea on exertion and atypical symptoms which demonstrated ischemia in the septum.   He underwent invasive ischemic evaluation demonstrating 60% mid LAD disease compromising ostial proximal diagonal and the continuation LAD, FFR revealed borderline hemodynamic status with 0.91 value not meeting criteria for intervention, this is been managed medically, he was cleared for hip surgery and completed this successfully..   He has been followed intermittently with paroxysmal nature of his atrial fibrillation as well as CV comorbidities and nonobstructive CAD.       He presents back ultimately with recurrent atrial fibrillation today with underlying left bundle branch block left axis deviation with some reports of chest discomfort and dyspnea on exertion.  We discussed need for long-term anticoagulation with [id number] score of 3, lipid-lowering therapy, afterload reduction, heart rate control, secondary prevention and reevaluation of his coronary anatomy.  Work-up revealed abnormal stress test with anterior apical and septal reversibility concerning for progression of LAD and diagonal disease previously seen last year, he is having anginal chest pain that is stuttering in quality.  He is more short of breath.  We discussed abnormal stress test and echo with newly reduced EF of 20% compared to 50% last year with need for invasive ischemic evaluation to evaluate LAD diagonal which she is agreeable for.  Also EP evaluation for more  persistent atrial fibrillation which may be contributing to reduced EF in addition to left bundle branch block and dyssynchrony     Diet exercise per AHA guidelines recommended  Mediterranean diet low-cholesterol     EKG atrial fibrillation left bundle branch block today     Review of systems negative for 14 point review of systems except as mentioned above     Historical data copied forward from previous encounters in EMR including the history, exam, and assessment/plan has been reviewed and is unchanged unless noted otherwise.     Cardiac medicines reviewed with risk, benefits, and necessity of each discussed.     Risk and benefit of cardiac catheterization i reviewed including death heart attack stroke pain bleeding infection need for vascular /cardiovascular surgery were discussed and the patient         Reviewed below     Physical Exam  Regular rate and rhythm no rubs murmurs gallops  No heave no lift  No clubbing cyanosis or edema  Clear to auscultation  Normocephalic atraumatic pupils equal round  Intact grossly  Soft nontender nondistended  Normal pulses normal cap refill  No carotid bruits or JVD  Unchanged from prior encounter     Assessment:       1. Abnormal stress test (Primary)  Coronary artery disease  Cardiomyopathy with newly reduced EF 20% compared to 50% previously last year  FFR the LAD is 0.91 April 2022  Nonobstructive disease in the circumflex and RCA at that time  Recurrence of more unstable symptoms with exertional chest pain dyspnea on exertion  Abnormal stress test with reversibility at the apex and septum  New reduced ejection fraction of 20%, previously 50% which may be multifactorial with A-fib and tachycardias cardiomyopathy versus ischemic cardiomyopathy which is unclear at this point    Left heart catheterization with coronary angiography for definitive valuation of LAD and diagonal which were borderline last year with newly reduced EF    Return of atrial fibrillation now more  "persistent, new decline in EF with left bundle branch block  Ask EP to see the patient for A-fib ablation and restoration of sinus rhythm, consideration for resynchronization therapy potentially with biventricular device if indicated at that time depending on further work-up     #2 dyspnea on exertion, return of atrial fibrillation  Depressed EF  Clinical heart failure with reduced EF 20% by recent 2D echo     3. Paroxysmal atrial fibrillation (HCC)  Xarelto 20 daily with chads Vascor of 3  diltiazem on board  Remains off beta-blockers       Hyperlipidemia and nonobstructive CAD, continue high intensity statin Crestor to 20 daily, correlate with lipid studies to meet goals per guidelines      Further recommendations follow invasive ischemic evaluation given cardiomyopathy of new onset    Truong Ortiz MD, PhD    Objective:         /96 (BP Location: Left arm, Patient Position: Sitting)   Pulse 86   Resp 18   Ht 182.9 cm (72\")   Wt 116 kg (255 lb)   BMI 34.58 kg/m²   Diagnoses and all orders for this visit:    1. Abnormal stress test (Primary)  Overview:  Added automatically from request for surgery [id number]    Orders:  -     Case Request Cath Lab: Left Heart Cath  -     CBC & Differential  -     Comprehensive Metabolic Panel  -     Protime-INR    2. Chest pain, atypical  -     Case Request Cath Lab: Left Heart Cath  -     CBC & Differential  -     Comprehensive Metabolic Panel  -     Protime-INR            The pleasure to be involved in this patient's cardiovascular care.  Please call with any questions or concerns  Truong Ortiz MD, PhD    Most recent EKG as reviewed and interpreted by me:  Procedures     Most recent echo as reviewed and interpreted by me:  Results for orders placed during the hospital encounter of 11/24/23    Adult Transthoracic Echo Complete W/ Color, Spectral and Contrast if Necessary Per Protocol    Interpretation Summary    The left ventricular cavity is moderately dilated.    Left " ventricular diastolic function is consistent with (grade II w/high LAP) pseudonormalization.    The right ventricular cavity is borderline dilated.    Left atrial volume is mildly increased.    Estimated right ventricular systolic pressure from tricuspid regurgitation is mildly elevated (35-45 mmHg).    Mild pulmonary hypertension is present.    Regional abnormalities as described  EF 15 to 20% severely reduced  [id number] variability with bundle branch block as well as underlying atrial fibrillation      Most recent stress test as reviewed and interpreted by me:  Results for orders placed during the hospital encounter of 11/24/23    Stress Test With Myocardial Perfusion One Day    Interpretation Summary    Left ventricular ejection fraction is severely reduced (Calculated EF = 22%).    Abnormal LV wall motion consistent with severe hypokinesis.    Myocardial perfusion imaging indicates a moderate-sized infarct located in the anterior wall, septal wall and apex with moderate brad-infarct ischemia.    Impressions are consistent with a high risk study.    There is no prior study available for comparison. Resting and stress images were compared Resting images demonstrate decreased counts in the mid to apical anterior wall and anteroseptum consistent with prior MI Stress images demonstrate worsening of brad-infarct ischemia at least moderate in the anteroseptum, anterior wall apex and inferoseptum.  EF 22%, dilated ventricle  mL Abnormal study.    Findings consistent with an equivocal ECG stress test.    Abnormal study  Moderate brad-infarct ischemia in the septum anterior wall and apex  Severely reduced EF 22%  Underlying atrial fibrillation with left bundle branch block  Dilated ventricle  Intermediate/ high risk study      Most recent cardiac catheterization as reviewed interpreted by me:  Results for orders placed during the hospital encounter of 04/22/22    Cardiac Catheterization/Vascular  Study    Narrative   Truong Ortiz MD, PhD  [facility] cardiology  Date of service 4-    Procedure  1.  Left heart catheterization with coronary angiography left ventriculography in MENESES position  2 FFR of the mid LAD with peripherally administered adenosine secondary to 60% bifurcation disease and abnormal stress test    Indication  Abnormal stress test  Dyspnea on exertion    After informed consent patient was brought to Cath Lab sterilely prepped and draped in usual fashion with exposure of the right wrist for right radial access via micropuncture modified surgery technique with placement of a 5/6 slender sheath, standard cocktail of heparin nitroglycerin and Cardene was administered via the sheath followed by 035 guidewire to the aortic valve followed by JL 3 for left coronary angiography, JR4 for right coronary angiography, JR4 was used across aortic valve followed by hand-injection for LV gram, EDP assessment and pullback assessment of the transaortic valve gradient.  Secondary to moderate disease of the mid LAD at the bifurcation with acute angle takeoff of the diagonal branch and abnormal stress test decision was made for hemodynamic assessment.  Patient was heparinized with 100 units/kg of heparin for ACT greater than 250.  A 6 Serbian EBU 3.5 guide was used engage left main followed by fractional flow reserve via standard technique with equalization of the left main and peripherally administered adenosine at 140 mcg/kg/min, minimum FFR value of 0.91 which was insignificant hemodynamically despite 60% bifurcation disease but in a very large caliber portion of the vessel.  Pullback revealed no significant drift and then all catheters and equipment removed.  Patient had no complications and left the Cath Lab chest pain-free hemodynamically electrically stable alert talking to staff neurologically grossly intact bilaterally with TR band    Findings  1.  Opening aortic pressure  134/84  2.  Closing pressure was unchanged  3.  LVEDP 12-14  4.  Normal LV systolic function 60%  5.  No transaortic valve gradient seen on pullback    Complications none  Blood loss less than 5 cc  Contrast 95 cc  Moderate conscious sedation time of 45 minutes with IV Versed and fentanyl administered by registered nurse with complete ECG pulse oximetry and hemodynamic monitoring throughout the entirety the case observed by me    Angiography  1 left main large-caliber vessel no disease  2.  LAD is a large-caliber vessel coursing to and around the apex with a large diagonal branch in the midportion, the proximal diagonal branch is essentially a ramus intermedius, proximal LAD diffusely medical irregularities 10% maximally, there is a mid to distal takeoff of a sizable diagonal branch at least 3 mm in diameter with acute angle takeoff, this bifurcation has 50 to 60% stenosis involving lara 1/1/1 classification with inflow LAD as well as both continuation LAD and ostial diagonal locations by definition.  Distally there is only luminal irregularities less than 10%  3.  Circumflex is a large-caliber codominant vessel with large PLV branch, there is no angiographic disease of any significance with only mild luminal irregularities less than 10%, obtuse marginal branch is a least 4 mm in diameter and trifurcates along the lateral wall  4.  RCA is a large-caliber codominant vessel, there is diffuse luminal regularities throughout 20% with PDA that is widely patent but there is slow flow indicative of possibly some microvascular dysfunction.  There is no obstructive CAD    Conclusions and recommendations  1.  Diffuse luminary  irregularities throughout all coronaries most severe in the RCA, moderate disease in the mid LAD 50 to 60% at the bifurcation of the diagonal branch in the midportion, negative FFR at 0.91 not hemodynamically significant and can be treated medically  2.  Preserved LVEF, normal transaortic valve  gradient, normal LVEDP    Secondary prevention goals for CAD, diet exercise cholesterol reduction to try to minimize and reduce plaque burden    Patient be discharged once discharge criteria met today in follow-up as outpatient    Truong Ortiz MD, PhD    The following portions of the patient's history were reviewed and updated as appropriate: allergies, current medications, past family history, past medical history, past social history, past surgical history, and problem list.      ROS:  14 point review of systems negative except as mentioned above    Current Outpatient Medications:     aspirin 81 MG chewable tablet, Chew 1 tablet Daily., Disp: , Rfl:     dilTIAZem (TIAZAC) 360 MG 24 hr capsule, Take 1 capsule by mouth Daily., Disp: 90 capsule, Rfl: 0    finasteride (PROSCAR) 5 MG tablet, Take 1 tablet by mouth Daily., Disp: , Rfl:     meloxicam (MOBIC) 15 MG tablet, Take 1 tablet by mouth Daily., Disp: , Rfl:     rivaroxaban (XARELTO) 20 MG tablet, Take 1 tablet by mouth Daily., Disp: 30 tablet, Rfl: 5    rosuvastatin (CRESTOR) 20 MG tablet, TAKE 1 TABLET BY MOUTH EVERY DAY, Disp: 90 tablet, Rfl: 0    vitamin D3 125 MCG (5000 UT) capsule capsule, Take 1 capsule by mouth Daily., Disp: , Rfl:     Problem List:  Patient Active Problem List   Diagnosis    Gastroenteritis, acute    Mixed hyperlipidemia    Essential hypertension    Left bundle branch block    Low back pain    Obesity    Paroxysmal atrial fibrillation    Abnormal stress test    Chest pain, atypical     Past Medical History:  Past Medical History:   Diagnosis Date    Atrial fibrillation     Hyperlipidemia     Hypertension      Past Surgical History:  Past Surgical History:   Procedure Laterality Date    CARDIAC CATHETERIZATION N/A 4/22/2022    Procedure: Left Heart Cath;  Surgeon: Truong Ortiz MD;  Location: [facility] CATH INVASIVE LOCATION;  Service: Cardiology;  Laterality: N/A;     Social History:  Social History     Socioeconomic History     Marital status:    Tobacco Use    Smoking status: Never    Smokeless tobacco: Never   Vaping Use    Vaping Use: Never used   Substance and Sexual Activity    Alcohol use: Yes     Alcohol/week: 6.0 standard drinks of alcohol     Types: 6 Cans of beer per week     Comment: a year     Drug use: Never    Sexual activity: Defer     Allergies:  No Known Allergies  Immunizations:  Immunization History   Administered Date(s) Administered    COVID-19 (MODERNA) 1st,2nd,3rd Dose Monovalent 02/10/2021, 03/16/2021    Flu Vaccine Quad PF 6-35MO 09/24/2016, 10/20/2019    Fluad Quad 65+ 10/20/2020    Fluzone (or Fluarix & Flulaval for VFC) >6mos 10/20/2019    Hepatitis A 04/29/2018, 11/05/2018    Influenza Quad Vaccine (Inpatient) 10/28/2017    Influenza, Unspecified 01/08/2013, 11/24/2013, 10/24/2014, 11/05/2018    Pneumococcal Conjugate 13-Valent (PCV13) 09/24/2020    Zostavax 12/08/2016            In-Office Procedure(s):  No orders to display        ASCVD RIsk Score::  The ASCVD Risk score (Sloan DK, et al., 2019) failed to calculate for the following reasons:    The patient has a prior MI or stroke diagnosis    Imaging:    Results for orders placed in visit on 05/20/21    XR Humerus Left    Narrative  left humerus X-Ray  Indication: Postop ORIF humerus  AP and lateral  Findings: Healed fracture hardware in position  no bony lesion  Soft tissues normal  not examined joint spaces  Hardware appropriately positioned yes      yes prior studies available for comparison.    X-RAY was ordered and reviewed by Jarrell Deng MD       Results for orders placed during the hospital encounter of 10/18/23    CT Angiogram Chest Pulmonary Embolism    Narrative  CT ANGIOGRAM CHEST PULMONARY EMBOLISM    Date of Exam: 10/18/2023 4:54 PM EDT    Indication: SHORTNESS OF BREATH.    Comparison: Chest radiograph 12/17/2016.    Technique: Axial CT images were obtained of the chest after the uneventful intravenous administration of iodinated  contrast utilizing pulmonary embolism protocol.  Sagittal and coronal reconstructions were performed.  Automated exposure control and  iterative reconstruction methods were used.      Findings:  Thyroid unremarkable. No supraclavicular adenopathy. Aortic atherosclerotic disease without aneurysm. Dilated main pulmonary artery up to 4.1 cm suggestive of pulmonary arterial hypertension. Negative for pulmonary embolism. Mild cardiomegaly. Moderate  calcified coronary atherosclerotic disease. No pericardial effusion.    No suspicious adenopathy. Esophagus grossly unremarkable. Multiple gallstones noted. Thickened adrenal glands without measurable nodule. Degenerative osteoarthritis of right glenohumeral joint. Surgical changes in the left humerus. Diffuse idiopathic  skeletal hyperostosis. No acute displaced fracture or aggressive bone lesion.    Central airways patent. Mild nonspecific peribronchial thickening most notably in the lower lobes. Mild smooth interlobar septal thickening with trace right effusion. Negative for infectious infiltrate, suspicious nodule or pneumothorax.    Impression  Impression:  1. Negative for pulmonary embolism.  2. Dilated main pulmonary artery which can be seen with pulmonary arterial hypertension.  3. Cardiomegaly with mild interstitial edema and trace right effusion.  4. Moderate coronary atherosclerotic disease.  5. Cholelithiasis.        Electronically Signed: Madan Bishop MD  10/18/2023 5:14 PM EDT  Workstation ID: [id number]      Results for orders placed during the hospital encounter of 10/18/23    CT Angiogram Chest Pulmonary Embolism    Narrative  CT ANGIOGRAM CHEST PULMONARY EMBOLISM    Date of Exam: 10/18/2023 4:54 PM EDT    Indication: SHORTNESS OF BREATH.    Comparison: Chest radiograph 12/17/2016.    Technique: Axial CT images were obtained of the chest after the uneventful intravenous administration of iodinated contrast utilizing pulmonary embolism protocol.  Sagittal and  coronal reconstructions were performed.  Automated exposure control and  iterative reconstruction methods were used.      Findings:  Thyroid unremarkable. No supraclavicular adenopathy. Aortic atherosclerotic disease without aneurysm. Dilated main pulmonary artery up to 4.1 cm suggestive of pulmonary arterial hypertension. Negative for pulmonary embolism. Mild cardiomegaly. Moderate  calcified coronary atherosclerotic disease. No pericardial effusion.    No suspicious adenopathy. Esophagus grossly unremarkable. Multiple gallstones noted. Thickened adrenal glands without measurable nodule. Degenerative osteoarthritis of right glenohumeral joint. Surgical changes in the left humerus. Diffuse idiopathic  skeletal hyperostosis. No acute displaced fracture or aggressive bone lesion.    Central airways patent. Mild nonspecific peribronchial thickening most notably in the lower lobes. Mild smooth interlobar septal thickening with trace right effusion. Negative for infectious infiltrate, suspicious nodule or pneumothorax.    Impression  Impression:  1. Negative for pulmonary embolism.  2. Dilated main pulmonary artery which can be seen with pulmonary arterial hypertension.  3. Cardiomegaly with mild interstitial edema and trace right effusion.  4. Moderate coronary atherosclerotic disease.  5. Cholelithiasis.        Electronically Signed: Madan Bishop MD  10/18/2023 5:14 PM EDT  Workstation ID: [id number]      Lab Review:   Hospital Outpatient Visit on 11/24/2023   Component Date Value    BH CV STRESS PROTOCOL 1 11/24/2023 Pharmacologic     Stage 1 11/24/2023 1.0     HR Stage 1 11/24/2023 99     BP Stage 1 11/24/2023 116/84     Duration Min Stage 1 11/24/2023 0     Duration Sec Stage 1 11/24/2023 10     Stress Dose Regadenoson * 11/24/2023 0.40     Stress Comments Stage 1 11/24/2023 10 sec bolus injection     Baseline HR 11/24/2023 98     Baseline BP 11/24/2023 113/79     Peak HR 11/24/2023 128     Peak BP 11/24/2023  134/83     Recovery HR 11/24/2023 110     Recovery BP 11/24/2023 117/80     Target HR (85%) 11/24/2023 129     Max. Pred. HR (100%) 11/24/2023 152     Percent Max Pred HR 11/24/2023 84.21     Percent Target HR 11/24/2023 99     BH CV REST NUCLEAR ISOTO* 11/24/2023 10.0     BH CV STRESS NUCLEAR ISO* 11/24/2023 33.0     Nuc Stress EF 11/24/2023 22    Hospital Outpatient Visit on 11/24/2023   Component Date Value    LVIDd 11/24/2023 5.2     LVIDs 11/24/2023 4.5     IVSd 11/24/2023 0.99     LVPWd 11/24/2023 0.99     FS 11/24/2023 14.2     IVS/LVPW 11/24/2023 1.00     ESV(cubed) 11/24/2023 90.8     LV Sys Vol (BSA correcte* 11/24/2023 54.0     EDV(cubed) 11/24/2023 144.0     LV Wright Vol (BSA correct* 11/24/2023 64.2     LV mass(C)d 11/24/2023 193.3     LVOT area 11/24/2023 3.1     LVOT diam 11/24/2023 1.98     EDV(MOD-sp4) 11/24/2023 149.6     ESV(MOD-sp4) 11/24/2023 125.9     SV(MOD-sp4) 11/24/2023 23.7     SI(MOD-sp4) 11/24/2023 10.2     EF(MOD-sp4) 11/24/2023 15.9     MV E max belia 11/24/2023 81.4     SV(LVOT) 11/24/2023 49.2     RVIDd 11/24/2023 5.1     LV V1 max 11/24/2023 87.8     LV V1 max PG 11/24/2023 3.1     LV V1 mean PG 11/24/2023 1.75     LV V1 VTI 11/24/2023 16.0     Ao pk belia 11/24/2023 116.7     Ao max PG 11/24/2023 5.5     Ao mean PG 11/24/2023 3.1     Ao V2 VTI 11/24/2023 20.1     CHEKO(I,D) 11/24/2023 2.44     MR max belia 11/24/2023 429.3     MR max PG 11/24/2023 73.7     TR max belia 11/24/2023 294.6     TR max PG 11/24/2023 34.9     RV V1 max PG 11/24/2023 3.2     RV V1 max 11/24/2023 89.9     RV V1 VTI 11/24/2023 17.5     PA V2 max 11/24/2023 112.8     PA acc time 11/24/2023 0.10     Ao root diam 11/24/2023 3.5     ACS 11/24/2023 2.14     EF(MOD-bp) 11/24/2023 16.0    Hospital Outpatient Visit on 10/18/2023   Component Date Value    Creatinine 10/18/2023 1.30     eGFR 10/18/2023 59.8 (L)    Lab on 10/18/2023   Component Date Value    HS Troponin T 10/18/2023 20 (H)    Lab on 10/18/2023   Component Date  Value    Glucose 10/18/2023 98     BUN 10/18/2023 24 (H)     Creatinine 10/18/2023 1.18     Sodium 10/18/2023 142     Potassium 10/18/2023 4.8     Chloride 10/18/2023 105     CO2 10/18/2023 29.0     Calcium 10/18/2023 9.7     Total Protein 10/18/2023 6.4     Albumin 10/18/2023 4.4     ALT (SGPT) 10/18/2023 30     AST (SGOT) 10/18/2023 22     Alkaline Phosphatase 10/18/2023 60     Total Bilirubin 10/18/2023 0.8     Globulin 10/18/2023 2.0     A/G Ratio 10/18/2023 2.2     BUN/Creatinine Ratio 10/18/2023 20.3     Anion Gap 10/18/2023 8.0     eGFR 10/18/2023 67.2     proBNP 10/18/2023 1,874.0 (H)     D-Dimer, Quantitative 10/18/2023 0.74 (H)     HS Troponin T 10/18/2023 21 (H)     WBC 10/18/2023 5.80     RBC 10/18/2023 4.47     Hemoglobin 10/18/2023 14.3     Hematocrit 10/18/2023 43.3     MCV 10/18/2023 96.9     MCH 10/18/2023 31.9     MCHC 10/18/2023 33.0     RDW 10/18/2023 14.2     RDW-SD 10/18/2023 47.3     MPV 10/18/2023 7.8     Platelets 10/18/2023 201     Neutrophil % 10/18/2023 50.9     Lymphocyte % 10/18/2023 39.8     Monocyte % 10/18/2023 8.1     Eosinophil % 10/18/2023 0.8     Basophil % 10/18/2023 0.4     Neutrophils, Absolute 10/18/2023 3.00     Lymphocytes, Absolute 10/18/2023 2.30     Monocytes, Absolute 10/18/2023 0.50     Eosinophils, Absolute 10/18/2023 0.00     Basophils, Absolute 10/18/2023 0.00     nRBC 10/18/2023 0.0      Recent labs reviewed and interpreted for clinical significance and application            Level of Care:           Truong Ortiz MD  11/30/23  .

## 2023-12-01 ENCOUNTER — HOSPITAL ENCOUNTER (INPATIENT)
Facility: HOSPITAL | Age: 68
LOS: 1 days | Discharge: HOME | DRG: 324 | End: 2023-12-04
Attending: INTERNAL MEDICINE | Admitting: STUDENT IN AN ORGANIZED HEALTH CARE EDUCATION/TRAINING PROGRAM
Payer: MEDICARE

## 2023-12-01 ENCOUNTER — LAB (OUTPATIENT)
Dept: LAB | Facility: HOSPITAL | Age: 68
DRG: 324 | End: 2023-12-01
Payer: MEDICARE

## 2023-12-01 DIAGNOSIS — I25.5 ISCHEMIC CARDIOMYOPATHY: Primary | ICD-10-CM

## 2023-12-01 DIAGNOSIS — R07.89 CHEST PAIN, ATYPICAL: ICD-10-CM

## 2023-12-01 DIAGNOSIS — R94.39 ABNORMAL STRESS TEST: ICD-10-CM

## 2023-12-01 PROBLEM — I25.10 CAD (CORONARY ARTERY DISEASE): Status: ACTIVE | Noted: 2023-12-01

## 2023-12-01 LAB
ACT BLD: 260 SECONDS (ref 89–137)
ACT BLD: 304 SECONDS (ref 89–137)
ALBUMIN SERPL-MCNC: 4.5 G/DL (ref 3.5–5.2)
ALBUMIN/GLOB SERPL: 2.1 G/DL
ALP SERPL-CCNC: 63 U/L (ref 39–117)
ALT SERPL W P-5'-P-CCNC: 28 U/L (ref 1–41)
ANION GAP SERPL CALCULATED.3IONS-SCNC: 7 MMOL/L (ref 5–15)
AST SERPL-CCNC: 16 U/L (ref 1–40)
BASOPHILS # BLD AUTO: 0 10*3/MM3 (ref 0–0.2)
BASOPHILS NFR BLD AUTO: 0.4 % (ref 0–1.5)
BILIRUB SERPL-MCNC: 1.2 MG/DL (ref 0–1.2)
BUN SERPL-MCNC: 21 MG/DL (ref 8–23)
BUN/CREAT SERPL: 17.8 (ref 7–25)
CALCIUM SPEC-SCNC: 9.8 MG/DL (ref 8.6–10.5)
CHLORIDE SERPL-SCNC: 105 MMOL/L (ref 98–107)
CO2 SERPL-SCNC: 29 MMOL/L (ref 22–29)
CREAT SERPL-MCNC: 1.18 MG/DL (ref 0.76–1.27)
DEPRECATED RDW RBC AUTO: 48.1 FL (ref 37–54)
EGFRCR SERPLBLD CKD-EPI 2021: 67.2 ML/MIN/1.73
EOSINOPHIL # BLD AUTO: 0 10*3/MM3 (ref 0–0.4)
EOSINOPHIL NFR BLD AUTO: 0.7 % (ref 0.3–6.2)
ERYTHROCYTE [DISTWIDTH] IN BLOOD BY AUTOMATED COUNT: 14 % (ref 12.3–15.4)
GLOBULIN UR ELPH-MCNC: 2.1 GM/DL
GLUCOSE SERPL-MCNC: 112 MG/DL (ref 65–99)
HCT VFR BLD AUTO: 43.7 % (ref 37.5–51)
HGB BLD-MCNC: 14.9 G/DL (ref 13–17.7)
INR PPP: 1.07 (ref 0.93–1.1)
LYMPHOCYTES # BLD AUTO: 2.5 10*3/MM3 (ref 0.7–3.1)
LYMPHOCYTES NFR BLD AUTO: 36.3 % (ref 19.6–45.3)
MCH RBC QN AUTO: 31.9 PG (ref 26.6–33)
MCHC RBC AUTO-ENTMCNC: 34.1 G/DL (ref 31.5–35.7)
MCV RBC AUTO: 93.6 FL (ref 79–97)
MONOCYTES # BLD AUTO: 0.6 10*3/MM3 (ref 0.1–0.9)
MONOCYTES NFR BLD AUTO: 8.1 % (ref 5–12)
NEUTROPHILS NFR BLD AUTO: 3.7 10*3/MM3 (ref 1.7–7)
NEUTROPHILS NFR BLD AUTO: 54.5 % (ref 42.7–76)
NRBC BLD AUTO-RTO: 0 /100 WBC (ref 0–0.2)
PLATELET # BLD AUTO: 197 10*3/MM3 (ref 140–450)
PMV BLD AUTO: 7.8 FL (ref 6–12)
POTASSIUM SERPL-SCNC: 5.1 MMOL/L (ref 3.5–5.2)
PROT SERPL-MCNC: 6.6 G/DL (ref 6–8.5)
PROTHROMBIN TIME: 11.6 SECONDS (ref 9.6–11.7)
RBC # BLD AUTO: 4.67 10*6/MM3 (ref 4.14–5.8)
SODIUM SERPL-SCNC: 141 MMOL/L (ref 136–145)
WBC NRBC COR # BLD AUTO: 6.8 10*3/MM3 (ref 3.4–10.8)

## 2023-12-01 PROCEDURE — 36415 COLL VENOUS BLD VENIPUNCTURE: CPT | Performed by: INTERNAL MEDICINE

## 2023-12-01 PROCEDURE — 25010000002 ENOXAPARIN PER 10 MG: Performed by: INTERNAL MEDICINE

## 2023-12-01 PROCEDURE — 0715T: CPT | Performed by: INTERNAL MEDICINE

## 2023-12-01 PROCEDURE — C1761: HCPCS | Performed by: INTERNAL MEDICINE

## 2023-12-01 PROCEDURE — G0378 HOSPITAL OBSERVATION PER HR: HCPCS

## 2023-12-01 PROCEDURE — 25010000002 DIGOXIN PER 500 MCG: Performed by: INTERNAL MEDICINE

## 2023-12-01 PROCEDURE — C1725 CATH, TRANSLUMIN NON-LASER: HCPCS | Performed by: INTERNAL MEDICINE

## 2023-12-01 PROCEDURE — C1769 GUIDE WIRE: HCPCS | Performed by: INTERNAL MEDICINE

## 2023-12-01 PROCEDURE — 80053 COMPREHEN METABOLIC PANEL: CPT | Performed by: INTERNAL MEDICINE

## 2023-12-01 PROCEDURE — 25010000002 MIDAZOLAM PER 1 MG: Performed by: INTERNAL MEDICINE

## 2023-12-01 PROCEDURE — C9601 PERC DRUG-EL COR STENT BRAN: HCPCS | Performed by: INTERNAL MEDICINE

## 2023-12-01 PROCEDURE — 02F03ZZ FRAGMENTATION IN CORONARY ARTERY, ONE ARTERY, PERCUTANEOUS APPROACH: ICD-10-PCS | Performed by: INTERNAL MEDICINE

## 2023-12-01 PROCEDURE — 85610 PROTHROMBIN TIME: CPT | Performed by: INTERNAL MEDICINE

## 2023-12-01 PROCEDURE — C1874 STENT, COATED/COV W/DEL SYS: HCPCS | Performed by: INTERNAL MEDICINE

## 2023-12-01 PROCEDURE — 25010000002 AMIODARONE IN DEXTROSE 5% 360-4.14 MG/200ML-% SOLUTION: Performed by: INTERNAL MEDICINE

## 2023-12-01 PROCEDURE — C1894 INTRO/SHEATH, NON-LASER: HCPCS | Performed by: INTERNAL MEDICINE

## 2023-12-01 PROCEDURE — 93458 L HRT ARTERY/VENTRICLE ANGIO: CPT | Performed by: INTERNAL MEDICINE

## 2023-12-01 PROCEDURE — C9600 PERC DRUG-EL COR STENT SING: HCPCS | Performed by: INTERNAL MEDICINE

## 2023-12-01 PROCEDURE — 99153 MOD SED SAME PHYS/QHP EA: CPT | Performed by: INTERNAL MEDICINE

## 2023-12-01 PROCEDURE — 25010000002 HEPARIN (PORCINE) PER 1000 UNITS: Performed by: INTERNAL MEDICINE

## 2023-12-01 PROCEDURE — 25510000001 IOPAMIDOL PER 1 ML: Performed by: INTERNAL MEDICINE

## 2023-12-01 PROCEDURE — 4A023N7 MEASUREMENT OF CARDIAC SAMPLING AND PRESSURE, LEFT HEART, PERCUTANEOUS APPROACH: ICD-10-PCS | Performed by: INTERNAL MEDICINE

## 2023-12-01 PROCEDURE — 99152 MOD SED SAME PHYS/QHP 5/>YRS: CPT | Performed by: INTERNAL MEDICINE

## 2023-12-01 PROCEDURE — 25810000003 SODIUM CHLORIDE 0.9 % SOLUTION: Performed by: INTERNAL MEDICINE

## 2023-12-01 PROCEDURE — B2151ZZ FLUOROSCOPY OF LEFT HEART USING LOW OSMOLAR CONTRAST: ICD-10-PCS | Performed by: INTERNAL MEDICINE

## 2023-12-01 PROCEDURE — 0270356 DILATION OF CORONARY ARTERY, ONE ARTERY, BIFURCATION, WITH TWO DRUG-ELUTING INTRALUMINAL DEVICES, PERCUTANEOUS APPROACH: ICD-10-PCS | Performed by: INTERNAL MEDICINE

## 2023-12-01 PROCEDURE — 85347 COAGULATION TIME ACTIVATED: CPT

## 2023-12-01 PROCEDURE — 85025 COMPLETE CBC W/AUTO DIFF WBC: CPT | Performed by: INTERNAL MEDICINE

## 2023-12-01 PROCEDURE — C1887 CATHETER, GUIDING: HCPCS | Performed by: INTERNAL MEDICINE

## 2023-12-01 PROCEDURE — 25010000002 NITROGLYCERIN 5 MG/ML SOLUTION: Performed by: INTERNAL MEDICINE

## 2023-12-01 PROCEDURE — 25010000002 AMIODARONE IN DEXTROSE 5% 150-4.21 MG/100ML-% SOLUTION: Performed by: INTERNAL MEDICINE

## 2023-12-01 PROCEDURE — B2111ZZ FLUOROSCOPY OF MULTIPLE CORONARY ARTERIES USING LOW OSMOLAR CONTRAST: ICD-10-PCS | Performed by: INTERNAL MEDICINE

## 2023-12-01 PROCEDURE — 25010000002 PHENYLEPHRINE 10 MG/ML SOLUTION: Performed by: INTERNAL MEDICINE

## 2023-12-01 PROCEDURE — 25010000002 FENTANYL CITRATE (PF) 100 MCG/2ML SOLUTION: Performed by: INTERNAL MEDICINE

## 2023-12-01 PROCEDURE — 25010000002 LIDOCAINE 1 % SOLUTION: Performed by: INTERNAL MEDICINE

## 2023-12-01 DEVICE — XIENCE SKYPOINT™ EVEROLIMUS ELUTING CORONARY STENT SYSTEM 2.50 MM X 12 MM / RAPID-EXCHANGE
Type: IMPLANTABLE DEVICE | Status: FUNCTIONAL
Brand: XIENCE SKYPOINT™

## 2023-12-01 DEVICE — XIENCE SKYPOINT™ EVEROLIMUS ELUTING CORONARY STENT SYSTEM 2.50 MM X 28 MM / RAPID-EXCHANGE
Type: IMPLANTABLE DEVICE | Status: FUNCTIONAL
Brand: XIENCE SKYPOINT™

## 2023-12-01 RX ORDER — DIGOXIN 250 MCG
250 TABLET ORAL
Status: DISCONTINUED | OUTPATIENT
Start: 2023-12-02 | End: 2023-12-02

## 2023-12-01 RX ORDER — MIDAZOLAM HYDROCHLORIDE 1 MG/ML
INJECTION INTRAMUSCULAR; INTRAVENOUS
Status: DISCONTINUED | OUTPATIENT
Start: 2023-12-01 | End: 2023-12-01 | Stop reason: HOSPADM

## 2023-12-01 RX ORDER — LIDOCAINE HYDROCHLORIDE 10 MG/ML
INJECTION, SOLUTION INFILTRATION; PERINEURAL
Status: DISCONTINUED | OUTPATIENT
Start: 2023-12-01 | End: 2023-12-01 | Stop reason: HOSPADM

## 2023-12-01 RX ORDER — SODIUM CHLORIDE 9 MG/ML
75 INJECTION, SOLUTION INTRAVENOUS CONTINUOUS
Status: DISPENSED | OUTPATIENT
Start: 2023-12-01 | End: 2023-12-02

## 2023-12-01 RX ORDER — CLOPIDOGREL BISULFATE 75 MG/1
300 TABLET ORAL ONCE
Status: COMPLETED | OUTPATIENT
Start: 2023-12-01 | End: 2023-12-01

## 2023-12-01 RX ORDER — CLOPIDOGREL BISULFATE 75 MG/1
75 TABLET ORAL DAILY
Status: DISCONTINUED | OUTPATIENT
Start: 2023-12-02 | End: 2023-12-04 | Stop reason: HOSPADM

## 2023-12-01 RX ORDER — MELOXICAM 15 MG/1
15 TABLET ORAL DAILY
Status: DISCONTINUED | OUTPATIENT
Start: 2023-12-01 | End: 2023-12-04 | Stop reason: HOSPADM

## 2023-12-01 RX ORDER — PHENYLEPHRINE HYDROCHLORIDE 10 MG/ML
INJECTION INTRAVENOUS
Status: DISCONTINUED | OUTPATIENT
Start: 2023-12-01 | End: 2023-12-01 | Stop reason: HOSPADM

## 2023-12-01 RX ORDER — ACETAMINOPHEN 325 MG/1
650 TABLET ORAL EVERY 4 HOURS PRN
Status: DISCONTINUED | OUTPATIENT
Start: 2023-12-01 | End: 2023-12-04 | Stop reason: HOSPADM

## 2023-12-01 RX ORDER — NITROGLYCERIN 0.4 MG/1
0.4 TABLET SUBLINGUAL
Status: DISCONTINUED | OUTPATIENT
Start: 2023-12-01 | End: 2023-12-04 | Stop reason: HOSPADM

## 2023-12-01 RX ORDER — HEPARIN SODIUM 1000 [USP'U]/ML
INJECTION, SOLUTION INTRAVENOUS; SUBCUTANEOUS
Status: DISCONTINUED | OUTPATIENT
Start: 2023-12-01 | End: 2023-12-01 | Stop reason: HOSPADM

## 2023-12-01 RX ORDER — ROSUVASTATIN CALCIUM 10 MG/1
20 TABLET, COATED ORAL NIGHTLY
Status: DISCONTINUED | OUTPATIENT
Start: 2023-12-01 | End: 2023-12-04 | Stop reason: HOSPADM

## 2023-12-01 RX ORDER — SODIUM CHLORIDE 9 MG/ML
INJECTION, SOLUTION INTRAVENOUS
Status: COMPLETED | OUTPATIENT
Start: 2023-12-01 | End: 2023-12-01

## 2023-12-01 RX ORDER — ASPIRIN 325 MG
TABLET ORAL
Status: DISCONTINUED | OUTPATIENT
Start: 2023-12-01 | End: 2023-12-01 | Stop reason: HOSPADM

## 2023-12-01 RX ORDER — FINASTERIDE 5 MG/1
5 TABLET, FILM COATED ORAL DAILY
Status: DISCONTINUED | OUTPATIENT
Start: 2023-12-01 | End: 2023-12-04 | Stop reason: HOSPADM

## 2023-12-01 RX ORDER — NITROGLYCERIN 5 MG/ML
INJECTION, SOLUTION INTRAVENOUS
Status: DISCONTINUED | OUTPATIENT
Start: 2023-12-01 | End: 2023-12-01 | Stop reason: HOSPADM

## 2023-12-01 RX ORDER — ASPIRIN 81 MG/1
81 TABLET, CHEWABLE ORAL DAILY
Status: DISCONTINUED | OUTPATIENT
Start: 2023-12-02 | End: 2023-12-04 | Stop reason: HOSPADM

## 2023-12-01 RX ORDER — FENTANYL CITRATE 50 UG/ML
INJECTION, SOLUTION INTRAMUSCULAR; INTRAVENOUS
Status: DISCONTINUED | OUTPATIENT
Start: 2023-12-01 | End: 2023-12-01 | Stop reason: HOSPADM

## 2023-12-01 RX ORDER — METOPROLOL TARTRATE 1 MG/ML
INJECTION, SOLUTION INTRAVENOUS
Status: DISCONTINUED | OUTPATIENT
Start: 2023-12-01 | End: 2023-12-01 | Stop reason: HOSPADM

## 2023-12-01 RX ORDER — ENOXAPARIN SODIUM 150 MG/ML
120 INJECTION SUBCUTANEOUS ONCE
Status: COMPLETED | OUTPATIENT
Start: 2023-12-01 | End: 2023-12-01

## 2023-12-01 RX ORDER — SODIUM CHLORIDE 9 MG/ML
30 INJECTION, SOLUTION INTRAVENOUS CONTINUOUS
Status: DISCONTINUED | OUTPATIENT
Start: 2023-12-01 | End: 2023-12-02

## 2023-12-01 RX ORDER — DIGOXIN 0.25 MG/ML
500 INJECTION INTRAMUSCULAR; INTRAVENOUS ONCE
Status: COMPLETED | OUTPATIENT
Start: 2023-12-01 | End: 2023-12-01

## 2023-12-01 RX ADMIN — MELOXICAM 15 MG: 15 TABLET ORAL at 16:04

## 2023-12-01 RX ADMIN — FINASTERIDE 5 MG: 5 TABLET, FILM COATED ORAL at 16:04

## 2023-12-01 RX ADMIN — CLOPIDOGREL BISULFATE 300 MG: 75 TABLET ORAL at 21:11

## 2023-12-01 RX ADMIN — SODIUM CHLORIDE 30 ML/HR: 9 INJECTION, SOLUTION INTRAVENOUS at 11:01

## 2023-12-01 RX ADMIN — AMIODARONE HYDROCHLORIDE 1 MG/MIN: 1.8 INJECTION, SOLUTION INTRAVENOUS at 15:13

## 2023-12-01 RX ADMIN — AMIODARONE HYDROCHLORIDE 150 MG: 1.5 INJECTION, SOLUTION INTRAVENOUS at 14:52

## 2023-12-01 RX ADMIN — DIGOXIN 500 MCG: 0.25 INJECTION INTRAMUSCULAR; INTRAVENOUS at 14:47

## 2023-12-01 RX ADMIN — AMIODARONE HYDROCHLORIDE 0.5 MG/MIN: 1.8 INJECTION, SOLUTION INTRAVENOUS at 21:05

## 2023-12-01 RX ADMIN — SODIUM CHLORIDE 75 ML/HR: 9 INJECTION, SOLUTION INTRAVENOUS at 16:04

## 2023-12-01 RX ADMIN — ROSUVASTATIN 20 MG: 10 TABLET, FILM COATED ORAL at 21:11

## 2023-12-01 RX ADMIN — ENOXAPARIN SODIUM 120 MG: 150 INJECTION SUBCUTANEOUS at 21:11

## 2023-12-01 NOTE — Clinical Note
Second inflation of balloon - Max pressure = 20 dinh. 2nd Inflation of balloon - Duration = 10 seconds. Third inflation of balloon - Max pressure = 20 dinh. 3rd Inflation of balloon - Duration = 23 seconds. Fourth inflation of balloon - Max pressure = 20 dinh. 4th Inflation of balloon - Duration = 10 seconds.

## 2023-12-01 NOTE — Clinical Note
First balloon inflation max pressure = 18 dinh. First balloon inflation duration = 10 seconds. Second inflation of balloon - Max pressure = 16 dinh. 2nd Inflation of balloon - Duration = 6 seconds. Third inflation of balloon - Max pressure = 18 dinh. 3rd Inflation of balloon - Duration = 9 seconds.

## 2023-12-01 NOTE — Clinical Note
Second inflation of balloon - Max pressure = 20 dinh. 2nd Inflation of balloon - Duration = 35 seconds.

## 2023-12-01 NOTE — PLAN OF CARE
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

## 2023-12-01 NOTE — Clinical Note
First balloon inflation max pressure = 12 dinh. First balloon inflation duration = 22 seconds. Second inflation of balloon - Max pressure = 16 dinh. 2nd Inflation of balloon - Duration = 22 seconds. Third inflation of balloon - Max pressure = 18 dinh. 3rd Inflation of balloon - Duration = 18 seconds.

## 2023-12-01 NOTE — Clinical Note
First balloon inflation max pressure = 12 dinh. First balloon inflation duration = 10 seconds. Second inflation of balloon - Max pressure = 16 dinh. 2nd Inflation of balloon - Duration = 16 seconds.

## 2023-12-01 NOTE — Clinical Note
First balloon inflation max pressure = 12 dinh. First balloon inflation duration = 10 seconds. Second inflation of balloon - Max pressure = 12 dinh. 2nd Inflation of balloon - Duration = 10 seconds. Third inflation of balloon - Max pressure = 6 dinh. 3rd Inflation of balloon - Duration = 10 seconds. Fourth inflation of balloon - Max pressure = 8 dinh. 4th Inflation of balloon - Duration = 17 seconds.

## 2023-12-02 ENCOUNTER — PREP FOR SURGERY (OUTPATIENT)
Dept: OTHER | Facility: HOSPITAL | Age: 68
End: 2023-12-02
Payer: MEDICARE

## 2023-12-02 DIAGNOSIS — I48.19 PERSISTENT ATRIAL FIBRILLATION: Primary | ICD-10-CM

## 2023-12-02 PROBLEM — I25.5 ISCHEMIC CARDIOMYOPATHY: Status: ACTIVE | Noted: 2023-12-02

## 2023-12-02 LAB
ANION GAP SERPL CALCULATED.3IONS-SCNC: 10 MMOL/L (ref 5–15)
BUN SERPL-MCNC: 17 MG/DL (ref 8–23)
BUN/CREAT SERPL: 14.5 (ref 7–25)
CALCIUM SPEC-SCNC: 9 MG/DL (ref 8.6–10.5)
CHLORIDE SERPL-SCNC: 105 MMOL/L (ref 98–107)
CHOLEST SERPL-MCNC: 93 MG/DL (ref 0–200)
CO2 SERPL-SCNC: 25 MMOL/L (ref 22–29)
CREAT SERPL-MCNC: 1.17 MG/DL (ref 0.76–1.27)
DEPRECATED RDW RBC AUTO: 48.1 FL (ref 37–54)
EGFRCR SERPLBLD CKD-EPI 2021: 67.9 ML/MIN/1.73
ERYTHROCYTE [DISTWIDTH] IN BLOOD BY AUTOMATED COUNT: 14 % (ref 12.3–15.4)
GLUCOSE SERPL-MCNC: 110 MG/DL (ref 65–99)
HBA1C MFR BLD: 6.2 % (ref 4.8–5.6)
HCT VFR BLD AUTO: 40.7 % (ref 37.5–51)
HDLC SERPL-MCNC: 30 MG/DL (ref 40–60)
HGB BLD-MCNC: 13.6 G/DL (ref 13–17.7)
LDLC SERPL CALC-MCNC: 44 MG/DL (ref 0–100)
LDLC/HDLC SERPL: 1.45 {RATIO}
MCH RBC QN AUTO: 31.5 PG (ref 26.6–33)
MCHC RBC AUTO-ENTMCNC: 33.5 G/DL (ref 31.5–35.7)
MCV RBC AUTO: 94 FL (ref 79–97)
PLATELET # BLD AUTO: 168 10*3/MM3 (ref 140–450)
PMV BLD AUTO: 7.9 FL (ref 6–12)
POTASSIUM SERPL-SCNC: 4.5 MMOL/L (ref 3.5–5.2)
RBC # BLD AUTO: 4.33 10*6/MM3 (ref 4.14–5.8)
SODIUM SERPL-SCNC: 140 MMOL/L (ref 136–145)
TRIGL SERPL-MCNC: 97 MG/DL (ref 0–150)
VLDLC SERPL-MCNC: 19 MG/DL (ref 5–40)
WBC NRBC COR # BLD AUTO: 6.3 10*3/MM3 (ref 3.4–10.8)

## 2023-12-02 PROCEDURE — G0378 HOSPITAL OBSERVATION PER HR: HCPCS

## 2023-12-02 PROCEDURE — 25010000002 AMIODARONE IN DEXTROSE 5% 360-4.14 MG/200ML-% SOLUTION: Performed by: INTERNAL MEDICINE

## 2023-12-02 PROCEDURE — 80048 BASIC METABOLIC PNL TOTAL CA: CPT | Performed by: INTERNAL MEDICINE

## 2023-12-02 PROCEDURE — 93005 ELECTROCARDIOGRAM TRACING: CPT | Performed by: INTERNAL MEDICINE

## 2023-12-02 PROCEDURE — 85027 COMPLETE CBC AUTOMATED: CPT | Performed by: INTERNAL MEDICINE

## 2023-12-02 PROCEDURE — 80061 LIPID PANEL: CPT | Performed by: INTERNAL MEDICINE

## 2023-12-02 PROCEDURE — 83036 HEMOGLOBIN GLYCOSYLATED A1C: CPT | Performed by: INTERNAL MEDICINE

## 2023-12-02 RX ORDER — DIGOXIN 125 MCG
125 TABLET ORAL
Status: DISCONTINUED | OUTPATIENT
Start: 2023-12-02 | End: 2023-12-04 | Stop reason: HOSPADM

## 2023-12-02 RX ORDER — AMIODARONE HYDROCHLORIDE 200 MG/1
400 TABLET ORAL EVERY 12 HOURS SCHEDULED
Status: DISCONTINUED | OUTPATIENT
Start: 2023-12-02 | End: 2023-12-04 | Stop reason: HOSPADM

## 2023-12-02 RX ORDER — VALSARTAN 40 MG/1
20 TABLET ORAL 2 TIMES DAILY
Status: DISCONTINUED | OUTPATIENT
Start: 2023-12-02 | End: 2023-12-03

## 2023-12-02 RX ADMIN — ROSUVASTATIN 20 MG: 10 TABLET, FILM COATED ORAL at 22:15

## 2023-12-02 RX ADMIN — CLOPIDOGREL BISULFATE 75 MG: 75 TABLET ORAL at 08:00

## 2023-12-02 RX ADMIN — ASPIRIN 81 MG CHEWABLE TABLET 81 MG: 81 TABLET CHEWABLE at 08:00

## 2023-12-02 RX ADMIN — APIXABAN 5 MG: 5 TABLET, FILM COATED ORAL at 09:30

## 2023-12-02 RX ADMIN — VALSARTAN 20 MG: 40 TABLET, COATED ORAL at 22:15

## 2023-12-02 RX ADMIN — MELOXICAM 15 MG: 15 TABLET ORAL at 08:00

## 2023-12-02 RX ADMIN — APIXABAN 5 MG: 5 TABLET, FILM COATED ORAL at 22:15

## 2023-12-02 RX ADMIN — AMIODARONE HYDROCHLORIDE 400 MG: 200 TABLET ORAL at 17:10

## 2023-12-02 RX ADMIN — METOPROLOL TARTRATE 25 MG: 25 TABLET, FILM COATED ORAL at 09:30

## 2023-12-02 RX ADMIN — FINASTERIDE 5 MG: 5 TABLET, FILM COATED ORAL at 08:00

## 2023-12-02 RX ADMIN — AMIODARONE HYDROCHLORIDE 0.5 MG/MIN: 1.8 INJECTION, SOLUTION INTRAVENOUS at 07:51

## 2023-12-02 RX ADMIN — DIGOXIN 125 MCG: 125 TABLET ORAL at 12:04

## 2023-12-02 NOTE — PLAN OF CARE
Goal Outcome Evaluation:      Patient A&Ox4 and on room air. Cath site looks dry,clean and intact. Maintained NPO status overnight R/T cardioversion and/or ablation procedure. Call light within reach and patient has no questions at this time.

## 2023-12-02 NOTE — PLAN OF CARE
Pt is A&OX4 and pleasant, pt has been in afib this shift with intermittent tachycardia in the 120's-130's. Pt is supposed to have a BLANCHE/cardioversion Monday 12/04/2023. Consent was signed and placed in the chart. Amiodarone gtt was transitioned to PO, VSS at this time.   Problem: Adult Inpatient Plan of Care  Goal: Plan of Care Review  Outcome: Ongoing, Progressing  Goal: Patient-Specific Goal (Individualized)  Outcome: Ongoing, Progressing  Goal: Absence of Hospital-Acquired Illness or Injury  Outcome: Ongoing, Progressing  Intervention: Identify and Manage Fall Risk  Recent Flowsheet Documentation  Taken 12/2/2023 1600 by Susan Flowers LPN  Safety Promotion/Fall Prevention:   assistive device/personal items within reach   clutter free environment maintained   nonskid shoes/slippers when out of bed   safety round/check completed  Taken 12/2/2023 1400 by Susan Flowers LPN  Safety Promotion/Fall Prevention:   assistive device/personal items within reach   clutter free environment maintained   nonskid shoes/slippers when out of bed   safety round/check completed  Taken 12/2/2023 1200 by Susan Flowers LPN  Safety Promotion/Fall Prevention:   assistive device/personal items within reach   clutter free environment maintained   nonskid shoes/slippers when out of bed   safety round/check completed  Taken 12/2/2023 1000 by Susan Flowers LPN  Safety Promotion/Fall Prevention:   activity supervised   assistive device/personal items within reach   clutter free environment maintained   gait belt   fall prevention program maintained   nonskid shoes/slippers when out of bed   safety round/check completed  Taken 12/2/2023 0800 by Susan Flowers LPN  Safety Promotion/Fall Prevention:   assistive device/personal items within reach   activity supervised   clutter free environment maintained   gait belt   fall prevention program maintained   nonskid shoes/slippers when out of bed   safety round/check completed  Intervention:  Prevent Skin Injury  Recent Flowsheet Documentation  Taken 12/2/2023 1600 by Susan Flowers LPN  Skin Protection: adhesive use limited  Taken 12/2/2023 1200 by Susan Flowers LPN  Skin Protection: adhesive use limited  Taken 12/2/2023 0800 by Susan Flowers LPN  Skin Protection: adhesive use limited  Intervention: Prevent and Manage VTE (Venous Thromboembolism) Risk  Recent Flowsheet Documentation  Taken 12/2/2023 1600 by Susan Flowers LPN  Range of Motion: active ROM (range of motion) encouraged  Taken 12/2/2023 1200 by Susan Flowers LPN  Range of Motion: active ROM (range of motion) encouraged  Taken 12/2/2023 0800 by Susan Flowers LPN  Range of Motion: active ROM (range of motion) encouraged  Intervention: Prevent Infection  Recent Flowsheet Documentation  Taken 12/2/2023 1600 by Susan Flowers LPN  Infection Prevention:   hand hygiene promoted   single patient room provided  Taken 12/2/2023 1400 by Susan Flowers LPN  Infection Prevention:   hand hygiene promoted   single patient room provided  Taken 12/2/2023 1200 by Susan Flowers LPN  Infection Prevention:   hand hygiene promoted   single patient room provided  Taken 12/2/2023 1000 by Susan Flowers LPN  Infection Prevention:   hand hygiene promoted   single patient room provided  Taken 12/2/2023 0800 by Susan Flowers LPN  Infection Prevention:   hand hygiene promoted   single patient room provided  Goal: Optimal Comfort and Wellbeing  Outcome: Ongoing, Progressing  Intervention: Provide Person-Centered Care  Recent Flowsheet Documentation  Taken 12/2/2023 1600 by Susan Flowers LPN  Trust Relationship/Rapport:   care explained   thoughts/feelings acknowledged   reassurance provided   questions encouraged   questions answered  Taken 12/2/2023 1200 by Susan Flowers LPN  Trust Relationship/Rapport:   care explained   reassurance provided   thoughts/feelings acknowledged   questions encouraged   questions answered  Taken 12/2/2023 0800 by  Susan Flowers LPN  Trust Relationship/Rapport:   care explained   thoughts/feelings acknowledged   reassurance provided   questions encouraged   questions answered  Goal: Readiness for Transition of Care  Outcome: Ongoing, Progressing     Problem: Heart Failure Comorbidity  Goal: Maintenance of Heart Failure Symptom Control  Outcome: Ongoing, Progressing  Intervention: Maintain Heart Failure-Management  Recent Flowsheet Documentation  Taken 12/2/2023 1600 by Susan Flowers LPN  Medication Review/Management: medications reviewed  Taken 12/2/2023 1400 by Susan Flowers LPN  Medication Review/Management: medications reviewed  Taken 12/2/2023 1200 by Susan Flowers LPN  Medication Review/Management: medications reviewed  Taken 12/2/2023 1000 by Susan Flowers LPN  Medication Review/Management: medications reviewed  Taken 12/2/2023 0800 by Susan Flowers LPN  Medication Review/Management: medications reviewed     Problem: Hypertension Comorbidity  Goal: Blood Pressure in Desired Range  Outcome: Ongoing, Progressing  Intervention: Maintain Blood Pressure Management  Recent Flowsheet Documentation  Taken 12/2/2023 1600 by Susan Flowers LPN  Medication Review/Management: medications reviewed  Taken 12/2/2023 1400 by Susan Flowers LPN  Medication Review/Management: medications reviewed  Taken 12/2/2023 1200 by Susan Flowers LPN  Medication Review/Management: medications reviewed  Taken 12/2/2023 1000 by Susan Flowers LPN  Medication Review/Management: medications reviewed  Taken 12/2/2023 0800 by Susan Flowers LPN  Medication Review/Management: medications reviewed     Problem: Obstructive Sleep Apnea Risk or Actual Comorbidity Management  Goal: Unobstructed Breathing During Sleep  Outcome: Ongoing, Progressing     Problem: Fall Injury Risk  Goal: Absence of Fall and Fall-Related Injury  Outcome: Ongoing, Progressing  Intervention: Identify and Manage Contributors  Recent Flowsheet Documentation  Taken  12/2/2023 1600 by Susan Flowers LPN  Medication Review/Management: medications reviewed  Taken 12/2/2023 1400 by Susan Flowers LPN  Medication Review/Management: medications reviewed  Taken 12/2/2023 1200 by Susan Flowers LPN  Medication Review/Management: medications reviewed  Taken 12/2/2023 1000 by Susan Flowers LPN  Medication Review/Management: medications reviewed  Taken 12/2/2023 0800 by Susan Flowers LPN  Medication Review/Management: medications reviewed  Intervention: Promote Injury-Free Environment  Recent Flowsheet Documentation  Taken 12/2/2023 1600 by Susan Flowers LPN  Safety Promotion/Fall Prevention:   assistive device/personal items within reach   clutter free environment maintained   nonskid shoes/slippers when out of bed   safety round/check completed  Taken 12/2/2023 1400 by Susan Flowers LPN  Safety Promotion/Fall Prevention:   assistive device/personal items within reach   clutter free environment maintained   nonskid shoes/slippers when out of bed   safety round/check completed  Taken 12/2/2023 1200 by Susan Flowers LPN  Safety Promotion/Fall Prevention:   assistive device/personal items within reach   clutter free environment maintained   nonskid shoes/slippers when out of bed   safety round/check completed  Taken 12/2/2023 1000 by Susan Flowers LPN  Safety Promotion/Fall Prevention:   activity supervised   assistive device/personal items within reach   clutter free environment maintained   gait belt   fall prevention program maintained   nonskid shoes/slippers when out of bed   safety round/check completed  Taken 12/2/2023 0800 by Susan Flowers LPN  Safety Promotion/Fall Prevention:   assistive device/personal items within reach   activity supervised   clutter free environment maintained   gait belt   fall prevention program maintained   nonskid shoes/slippers when out of bed   safety round/check completed   Goal Outcome Evaluation:

## 2023-12-02 NOTE — PROGRESS NOTES
Referring Provider: Truong Ortiz,*     Patient Care Team:  Tre Burkett MD as PCP - General      SUBJECTIVE    Continues to be atrial fibrillation with heart rate in the 90s     ROS  Review of all systems negative except as indicated above    Personal History:    Past Medical History:   Diagnosis Date    Atrial fibrillation     Hyperlipidemia     Hypertension        Past Surgical History:   Procedure Laterality Date    CARDIAC CATHETERIZATION N/A 04/22/2022    Procedure: Left Heart Cath;  Surgeon: Truong Ortiz MD;  Location: [facility] CATH INVASIVE LOCATION;  Service: Cardiology;  Laterality: N/A;    CORONARY ANGIOPLASTY WITH STENT PLACEMENT  12/01/2023    Left heart cath with angioplasty and kissing stents to LAD and diagonal by Dr Ortiz       Family History   Problem Relation Age of Onset    Heart disease Mother     Heart disease Father        Social History     Tobacco Use    Smoking status: Never    Smokeless tobacco: Never   Vaping Use    Vaping Use: Never used   Substance Use Topics    Alcohol use: Yes     Alcohol/week: 6.0 standard drinks of alcohol     Types: 6 Cans of beer per week     Comment: a year     Drug use: Never        Home meds:  Prior to Admission medications    Medication Sig Start Date End Date Taking? Authorizing Provider   aspirin 81 MG chewable tablet Chew 1 tablet Daily.   Yes ProviderCharity MD   dilTIAZem (TIAZAC) 360 MG 24 hr capsule Take 1 capsule by mouth Daily. 9/29/23  Yes Truong Ortiz MD   finasteride (PROSCAR) 5 MG tablet Take 1 tablet by mouth Daily. 5/14/20  Yes Charity Stallings MD   meloxicam (MOBIC) 15 MG tablet Take 1 tablet by mouth Daily. 5/25/23  Yes Charity Stallings MD   rosuvastatin (CRESTOR) 20 MG tablet TAKE 1 TABLET BY MOUTH EVERY DAY 9/21/23  Yes Truong Ortiz MD   vitamin D3 125 MCG (5000 UT) capsule capsule Take 1 capsule by mouth Daily.   Yes ProviderCharity MD   rivaroxaban (XARELTO) 20 MG  "tablet Take 1 tablet by mouth Daily. 10/23/23   Truong Ortiz MD       Allergies:  Patient has no known allergies.      OBJECTIVE    Vital Signs  Vitals:    12/02/23 1000 12/02/23 1100 12/02/23 1200 12/02/23 1201   BP: 124/94 123/92 127/85    BP Location:       Patient Position:       Pulse: 90 86 95 89   Resp:       Temp:       TempSrc:       SpO2: 95% 97% 96% 96%   Weight:       Height:           Flowsheet Rows      Flowsheet Row First Filed Value   Admission Height 177.8 cm (70\") Documented at 12/01/2023 1055   Admission Weight 114 kg (251 lb 1.7 oz) Documented at 12/01/2023 1055              Intake/Output Summary (Last 24 hours) at 12/2/2023 1306  Last data filed at 12/2/2023 1201  Gross per 24 hour   Intake 922 ml   Output 2475 ml   Net -1553 ml              Physical Exam:  General-no acute distress  No elevated JVP noted.  Cardiovascular-S1-S2 normal, no murmurs noted.  Respiratory-normal breath sounds, no wheezing/crackles.  GI-abdomen is soft and nontender  No pedal edema        Results Review:    BNP        TROPONIN        CoAg  Results from last 7 days   Lab Units 12/01/23  0918   INR  1.07       Creatinine Clearance  Estimated Creatinine Clearance: 76.1 mL/min (by C-G formula based on SCr of 1.17 mg/dL).      Radiology  No radiology results for the last day      EKG  I personally viewed and interpreted the patient's EKG/Telemetry data:  ECG 12 Lead Drug Monitoring; Amiodarone   Preliminary Result   HEART RATE= 89  bpm   RR Interval= 673  ms   SD Interval=   ms   P Horizontal Axis=   deg   P Front Axis=   deg   QRSD Interval= 148  ms   QT Interval= 413  ms   QTcB= 503  ms   QRS Axis= -48  deg   T Wave Axis= 139  deg   - ABNORMAL ECG -   Atrial fibrillation   Left bundle branch block   ST elevation secondary to IVCD   Electronically Signed By:    Date and Time of Study: 2023-12-02 05:30:00      SCANNED - TELEMETRY     Final Result      SCANNED - TELEMETRY     Final Result      ECG 12 Lead Drug " Monitoring; Amiodarone    (Results Pending)   ECG 12 Lead Pre-Op / Pre-Procedure    (Results Pending)         Echocardiogram:    Results for orders placed during the hospital encounter of 11/24/23    Adult Transthoracic Echo Complete W/ Color, Spectral and Contrast if Necessary Per Protocol    Interpretation Summary    The left ventricular cavity is moderately dilated.    Left ventricular diastolic function is consistent with (grade II w/high LAP) pseudonormalization.    The right ventricular cavity is borderline dilated.    Left atrial volume is mildly increased.    Estimated right ventricular systolic pressure from tricuspid regurgitation is mildly elevated (35-45 mmHg).    Mild pulmonary hypertension is present.    Regional abnormalities as described  EF 15 to 20% severely reduced  Parx-mu-[id number] variability with bundle branch block as well as underlying atrial fibrillation        Stress Test:  Results for orders placed during the hospital encounter of 11/24/23    Stress Test With Myocardial Perfusion One Day    Interpretation Summary    Left ventricular ejection fraction is severely reduced (Calculated EF = 22%).    Abnormal LV wall motion consistent with severe hypokinesis.    Myocardial perfusion imaging indicates a moderate-sized infarct located in the anterior wall, septal wall and apex with moderate brad-infarct ischemia.    Impressions are consistent with a high risk study.    There is no prior study available for comparison. Resting and stress images were compared Resting images demonstrate decreased counts in the mid to apical anterior wall and anteroseptum consistent with prior MI Stress images demonstrate worsening of brad-infarct ischemia at least moderate in the anteroseptum, anterior wall apex and inferoseptum.  EF 22%, dilated ventricle  mL Abnormal study.    Findings consistent with an equivocal ECG stress test.    Abnormal study  Moderate brad-infarct ischemia in the septum anterior wall and  apex  Severely reduced EF 22%  Underlying atrial fibrillation with left bundle branch block  Dilated ventricle  Intermediate/ high risk study         Cardiac Catheterization:  Results for orders placed during the hospital encounter of 12/01/23    Cardiac Catheterization/Vascular Study    Narrative  Primary operative Truong Ortiz MD, PhD  [facility] cardiology  Date of service 12-1-2023    Procedure  1.  Left heart catheterization coronary angiography left ventriculography on MENESES position  2.  Shockwave balloon lithotripsy to the mid LAD diagonal bifurcation 2.5 x 12 utilization of 80 pulses circumferential calcium at least 180 degrees  3.,  Bifurcation PCI, 2.5 x 12 Xience drug-eluting stent postdilated at 20 dinh to the ostial proximal diagonal branch at 3 mm  4.,  LAD PCI 2.5 x 28 Xience drug-eluting stent in the midportion spanning the diagonal bifurcation, deployment at 20 dinh postdilatation 3.25 x 12 NC balloon at 18 to 20 dinh and distal to proximal fashion    Reduction of stenosis ostial diagonal branch 10% residual, less than 10% residual stenosis of LAD, HONORIO-3 flow pre and post, no distal wire trauma edge dissection or other complication    After informed consent patient brought to the Cath Lab sterilely prepped and draped in usual fashion exposure of the right groin for right common femoral arterial access via micropuncture and modified Seldinger technique with placement of a 6 Italian sheath.  A 3 5 guidewires advanced aortic valve followed by diagnostic JL 4 and JR4 catheters for selective left and right coronary angiography respectively.  JR4 was used to cross aortic valve followed by hand-injection for LV gram EDP assessment and pullback assessment of the transaortic valve gradient.  There was obstructive disease in the mid LAD at the diagonal bifurcation at least 80% to 90% lara 1/1/1 morphology with 180 degrees of circumferential calcium.  There was progression year-over-year from last year  comparative and angiography.  He has a newly reduced EF of 20%, previously 50% with abnormal stress test demonstrating reversibility of the anterior wall apex and septum.  Decision made for intervention.  Patient patient heparinized with 100 units/kg of heparin with repeated boluses maintain ACT greater than 250 on background therapy of aspirin.  A 7 [place] sheath was then upsized along with 7 [place] EBU 4.0 guide to engage left main, run-through wire to the diagonal branch and a BMW wire to the LAD, given calcification and bifurcation morphology, shockwave balloon lithotripsy with 2.5 x 12 balloon was performed with 80 pulses in the LAD.  We attempted to perform also of the ostial proximal diagonal branch however secondary wire wrapped we were unable to advance the deployed shockwave balloon.  This was ultimately removed followed by predilation of the ostial proximal diagonal segment with 2.5 x 12 NC balloon at 18 dinh with full expansion, further predilation was also performed in the LAD similarly, a 2.5 x 12 Xience drug stent was positioned the ostial proximal diagonal branch and a 2.5 x 28 Xience negative stent in the mid LAD, the diagonal lumen was deployed at 16 dinh followed by pullback of the stent balloon for postdilatation at 20 to 22 dinh for 30 seconds with great angiographic results, mild stepdown distally but otherwise much improvement of the ostial proximal segment.  This was removed followed by deployment of the LAD stent at 20 dinh which was viewed to be slightly undersized with improved runoff after predilation and nitroglycerin.  This was reinflated 22 dinh followed by postdilatation with 3.25 x 12 noncompliant balloon at 18 dinh and distal to proximal fashion with great angiographic results, 10% residual stenosis of the lesion, 20% residual stenosis in the ostial proximal diagonal branch.  Proximal optimization was performed as well at 22 dinh above the diagonal takeoff with great results.  No distal  wire trauma edge dissection or complications.  6 Chinese Angio-Seal was used to close right, from arteriotomy with immediate hemostasis and maintenance of distal pulses.  He left Cath Lab chest pain-free hemodynamically electrically stable alert talkative staff neurologically gross intact bilaterally    Findings  1.  Opening aortic pressure of 109/62, closing pressure was unchanged  2.,  No LV gram performed to conserve contrast, LVEDP elevated 23-24  3.  Normal transorbital gradient on pullback    Angiography  1 left main normal no disease  2.  The LAD is a medium caliber vessel with 80 to 90% disease at the bifurcation including the ostial proximal diagonal branch which is also 80 to 90%.  Remainder the vessel diffuse luminal irregularities only 20%  3.  Circumflex large caliber nondominant vessel with minimal angiographic disease throughout less than 10%  Over 4 RCA is a large-caliber dominant vessel, diffuse luminal irregularities less than 20% throughout    Conclusions recommendations  1 successful bifurcation stenting, 10% residual stenosis in each vessel at the bifurcation status post shockwave balloon lithotripsy and high-pressure postdilatation and proximal optimization as described  2.  Aspirin and P2 Y12 B inhibitor will be continued with cessation of aspirin in 30 days.  He will be back on anticoagulation given underlying atrial fibrillation    High intensity statin  Beta-blocker  Patient be admitted for rate control of atrial fibrillation with consult electrophysiology for consideration for elective DC cardioversion given severely reduced LV systolic function likely contributory by underlying atrial fibrillation and left bundle branch block with consideration for device placement with prior sinus bradycardia not amenable for beta-blockers previously essentially with tachybradycardia syndrome and likely combined ischemic nonischemic cardiomyopathy    Further recommendation follow findings and clinical  course    Truong Ortiz MD, PhD         Other:         ASSESSMENT & PLAN:    Abnormal stress test, coronary artery disease status post PCI to LAD Dr. Ortiz on 12/1/2023  Plan  - Continue baby aspirin and Plavix  - Also on anticoagulation for atrial fibrillation, continue triple therapy while in the hospital, will be discharged on NOAC plus Plavix for 1 year followed by NOAC plus aspirin indefinitely.    Atrial fibrillation  Relative rate controlled for now  Given significant cardiomyopathy, will plan for restoration of sinus rhythm  Plan  - Plan for BLANCHE cardioversion on Monday  - Continue apixaban 5 mg twice daily for now  - Continue digoxin for rate control  - Continue amiodarone IV infusion and switch to amiodarone p.o. later today.  -EP following  -Not a candidate for beta-blocker as per Dr. Ortiz noted.      Heart failure with severe reduced ejection fraction  Left bundle branch block  Optimize GDMT, will start valsartan 20 mg twice daily, if tolerates, will switch to Entresto tomorrow  - Outpatient evaluation for biventricular ICD  - Restoration of sinus rhythm as above  - Initiate MRA and SGLT2 as per clinical course.        EMR Dragon/Transcription disclaimer:  Much of this encounter note is an electronic transcription/translation of spoken language to printed text. The electronic translation of spoken language may permit erroneous, or at times, nonsensical words or phrases to be inadvertently transcribed; Although I have reviewed the note for such errors, some may still exist.    Lynsey Vaca MD  12/02/23  13:06 EST

## 2023-12-02 NOTE — CONSULTS
HP      Name: Dillon Aaron ADMIT: 2023   : 1955  PCP: Tre Burkett MD    MRN: [id number] LOS: 0 days   AGE/SEX: 67 y.o. male  ROOM:      No chief complaint on file.      Subjective        History of present illness  Dillon Aaron is a 67-year-old male patient who has history of paroxysmal atrial fibrillation, chronic left bundle branch block, is admitted to the hospital due to weakness and shortness of breath for the last couple of months.  Patient has noticed irregular heart rhythm on his blood pressure cuff for the last couple of months as well.  During this hospitalization he was found to be in A-fib with rapid ventricular rates, also he had a heart catheterization on 2023 for low ejection fraction and was found to have severe LAD lesion and was treated with PCI.  EP was consulted for rhythm management options.    Past Medical History:   Diagnosis Date    Atrial fibrillation     Hyperlipidemia     Hypertension      Past Surgical History:   Procedure Laterality Date    CARDIAC CATHETERIZATION N/A 2022    Procedure: Left Heart Cath;  Surgeon: Truong Ortiz MD;  Location: [facility] CATH INVASIVE LOCATION;  Service: Cardiology;  Laterality: N/A;    CORONARY ANGIOPLASTY WITH STENT PLACEMENT  2023    Left heart cath with angioplasty and kissing stents to LAD and diagonal by Dr Ortiz     Family History   Problem Relation Age of Onset    Heart disease Mother     Heart disease Father      Social History     Tobacco Use    Smoking status: Never    Smokeless tobacco: Never   Vaping Use    Vaping Use: Never used   Substance Use Topics    Alcohol use: Yes     Alcohol/week: 6.0 standard drinks of alcohol     Types: 6 Cans of beer per week     Comment: a year     Drug use: Never     Medications Prior to Admission   Medication Sig Dispense Refill Last Dose    aspirin 81 MG chewable tablet Chew 1 tablet Daily.   2023    dilTIAZem (TIAZAC) 360 MG 24 hr capsule Take 1 capsule  by mouth Daily. 90 capsule 0 11/30/2023    finasteride (PROSCAR) 5 MG tablet Take 1 tablet by mouth Daily.   11/30/2023    meloxicam (MOBIC) 15 MG tablet Take 1 tablet by mouth Daily.   11/30/2023    rosuvastatin (CRESTOR) 20 MG tablet TAKE 1 TABLET BY MOUTH EVERY DAY 90 tablet 0 11/30/2023    vitamin D3 125 MCG (5000 UT) capsule capsule Take 1 capsule by mouth Daily.   11/30/2023    rivaroxaban (XARELTO) 20 MG tablet Take 1 tablet by mouth Daily. 30 tablet 5 11/29/2023     Allergies:  Patient has no known allergies.    Review of systems    Constitutional: Negative.    Respiratory and cardiovascular: As detailed in HPI section.  Gastrointestinal: Negative for constipation, nausea and vomiting negative for abdominal distention, abdominal pain and diarrhea.   Genitourinary: Negative for difficulty urinating and flank pain.   Musculoskeletal: Negative for arthralgias, joint swelling and myalgias.   Skin: Negative for color change, rash and wound.   Neurological: Negative for dizziness, syncope, weakness and headaches.   Hematological: Negative for adenopathy.   Psychiatric/Behavioral: Negative for confusion.   All other systems reviewed and are negative.       Physical Exam  VITALS REVIEWED    General:      well developed, in no acute distress.    Head:      normocephalic and atraumatic.    Eyes:      PERRL/EOM intact, conjunctiva and sclera clear with out nystagmus.    Neck:      no masses, thyromegaly,  trachea central with normal respiratory effort and PMI displaced laterally  Lungs:      Clear to auscultation bilaterally  Heart:       Irregular rhythm, tachycardic  Msk:      no deformity or scoliosis noted of thoracic or lumbar spine.    Pulses:      pulses normal in all 4 extremities.    Extremities:       No lower extremity edema  Neurologic:      no focal deficits.   alert oriented x3  Skin:      intact without lesions or rashes.    Psych:      alert and cooperative; normal mood and affect; normal attention span  and concentration.      Result Review :               Pertinent cardiac workup    Heart 12/1/2023 90% bifurcating lesion at the LAD/diagonal, treated with PCI.  Echo 11/27/2023 ejection fraction 15 to 20%.  EKG 12/2/2023 atrial fibrillation with left bundle branch block, ventricular rate of 89 bpm.  EKG 12/16/2022 sinus rhythm with left bundle branch block      Assessment and Plan         Chest pain, atypical    Left bundle branch block    Paroxysmal atrial fibrillation    Abnormal stress test    CAD (coronary artery disease)    Ischemic cardiomyopathy      Dillon Aaron is a 67-year-old male patient who has history of paroxysmal atrial fibrillation, who was in sinus rhythm in December 2022, presented to the hospital essentially for CHF but also found to have A-fib with rapid ventricular rates, he has chronic left bundle branch block.  His ejection fraction was very low and heart catheterization showed severe LAD lesion for which she received stent.  He remains in A-fib with rapid ventricular rates.  He had been on Xarelto for about a month.  I believe that his cardiomyopathy is multifactorial, ischemic, nonischemic from A-fib with rapid ventricular rates but also dyssynchrony from left bundle branch block.    So for now we will focus on rhythm restoration, since he is symptomatic, we will go ahead and proceed with BLANCHE guided cardioversion on Monday.    After that he will be scheduled for an outpatient ablation as well.    Eventually it is likely that the patient will need a BiV ICD, unless his ejection fraction improves post revascularization and rhythm restoration.    All this was explained to the patient and he is agreeable with the outlined plan, he understands that he might need more than 1 procedure to help with his complex conditions.        No follow-ups on file.  Patient was given instructions and counseling regarding his condition or for health maintenance advice. Please see specific information pulled into  the AVS if appropriate.

## 2023-12-03 PROCEDURE — 93005 ELECTROCARDIOGRAM TRACING: CPT | Performed by: INTERNAL MEDICINE

## 2023-12-03 RX ADMIN — CLOPIDOGREL BISULFATE 75 MG: 75 TABLET ORAL at 08:13

## 2023-12-03 RX ADMIN — VALSARTAN 20 MG: 40 TABLET, COATED ORAL at 09:25

## 2023-12-03 RX ADMIN — SACUBITRIL AND VALSARTAN 1 TABLET: 24; 26 TABLET, FILM COATED ORAL at 21:00

## 2023-12-03 RX ADMIN — AMIODARONE HYDROCHLORIDE 400 MG: 200 TABLET ORAL at 20:58

## 2023-12-03 RX ADMIN — APIXABAN 5 MG: 5 TABLET, FILM COATED ORAL at 20:59

## 2023-12-03 RX ADMIN — APIXABAN 5 MG: 5 TABLET, FILM COATED ORAL at 08:13

## 2023-12-03 RX ADMIN — AMIODARONE HYDROCHLORIDE 400 MG: 200 TABLET ORAL at 08:13

## 2023-12-03 RX ADMIN — DIGOXIN 125 MCG: 125 TABLET ORAL at 13:14

## 2023-12-03 RX ADMIN — FINASTERIDE 5 MG: 5 TABLET, FILM COATED ORAL at 08:13

## 2023-12-03 RX ADMIN — ASPIRIN 81 MG CHEWABLE TABLET 81 MG: 81 TABLET CHEWABLE at 08:13

## 2023-12-03 RX ADMIN — MELOXICAM 15 MG: 15 TABLET ORAL at 08:14

## 2023-12-03 RX ADMIN — ROSUVASTATIN 20 MG: 10 TABLET, FILM COATED ORAL at 20:59

## 2023-12-03 NOTE — PROGRESS NOTES
Referring Provider: Truong Ortiz,*     Patient Care Team:  Tre Burkett MD as PCP - General      SUBJECTIVE    Continues to be in atrial fibrillation with heart rate ranging from 90-1 10  Overall feels well  Tolerated losartan, will switch to Entresto       ROS  Review of all systems negative except as indicated above    Personal History:    Past Medical History:   Diagnosis Date    Atrial fibrillation     Hyperlipidemia     Hypertension        Past Surgical History:   Procedure Laterality Date    CARDIAC CATHETERIZATION N/A 04/22/2022    Procedure: Left Heart Cath;  Surgeon: Truong Ortiz MD;  Location: [facility] CATH INVASIVE LOCATION;  Service: Cardiology;  Laterality: N/A;    CORONARY ANGIOPLASTY WITH STENT PLACEMENT  12/01/2023    Left heart cath with angioplasty and kissing stents to LAD and diagonal by Dr Ortiz       Family History   Problem Relation Age of Onset    Heart disease Mother     Heart disease Father        Social History     Tobacco Use    Smoking status: Never    Smokeless tobacco: Never   Vaping Use    Vaping Use: Never used   Substance Use Topics    Alcohol use: Yes     Alcohol/week: 6.0 standard drinks of alcohol     Types: 6 Cans of beer per week     Comment: a year     Drug use: Never        Home meds:  Prior to Admission medications    Medication Sig Start Date End Date Taking? Authorizing Provider   aspirin 81 MG chewable tablet Chew 1 tablet Daily.   Yes ProviderCharity MD   dilTIAZem (TIAZAC) 360 MG 24 hr capsule Take 1 capsule by mouth Daily. 9/29/23  Yes Truong Ortiz MD   finasteride (PROSCAR) 5 MG tablet Take 1 tablet by mouth Daily. 5/14/20  Yes ProviderCharity MD   meloxicam (MOBIC) 15 MG tablet Take 1 tablet by mouth Daily. 5/25/23  Yes Charity Stallings MD   rosuvastatin (CRESTOR) 20 MG tablet TAKE 1 TABLET BY MOUTH EVERY DAY 9/21/23  Yes Truong Ortiz MD   vitamin D3 125 MCG (5000 UT) capsule capsule Take 1 capsule  "by mouth Daily.   Yes Provider, MD Charity   rivaroxaban (XARELTO) 20 MG tablet Take 1 tablet by mouth Daily. 10/23/23   Truong Ortiz MD       Allergies:  Patient has no known allergies.      OBJECTIVE    Vital Signs  Vitals:    12/03/23 0447 12/03/23 0500 12/03/23 0813 12/03/23 0932   BP: 134/94  127/93 125/78   BP Location: Right arm   Left arm   Patient Position: Lying   Lying   Pulse:   93 99   Resp: 16   12   Temp: 98.1 °F (36.7 °C)   97.6 °F (36.4 °C)   TempSrc: Oral   Oral   SpO2:    96%   Weight:  115 kg (252 lb 6.8 oz)     Height:           Flowsheet Rows      Flowsheet Row First Filed Value   Admission Height 177.8 cm (70\") Documented at 12/01/2023 1055   Admission Weight 114 kg (251 lb 1.7 oz) Documented at 12/01/2023 1055              Intake/Output Summary (Last 24 hours) at 12/3/2023 1200  Last data filed at 12/3/2023 0932  Gross per 24 hour   Intake 680 ml   Output 2725 ml   Net -2045 ml              Physical Exam:  General-no acute distress  No elevated JVP noted.  Cardiovascular-S1-S2 normal, no murmurs noted.  Respiratory-normal breath sounds, no wheezing/crackles.  GI-abdomen is soft and nontender  No pedal edema        Results Review:    BNP        TROPONIN        CoAg  Results from last 7 days   Lab Units 12/01/23  0918   INR  1.07       Creatinine Clearance  Estimated Creatinine Clearance: 76.8 mL/min (by C-G formula based on SCr of 1.17 mg/dL).      Radiology  No radiology results for the last day      EKG  I personally viewed and interpreted the patient's EKG/Telemetry data:  ECG 12 Lead Drug Monitoring; Amiodarone   Preliminary Result   HEART RATE= 94  bpm   RR Interval= 640  ms   VA Interval=   ms   P Horizontal Axis=   deg   P Front Axis=   deg   QRSD Interval= 150  ms   QT Interval= 430  ms   QTcB= 538  ms   QRS Axis= 243  deg   T Wave Axis= 117  deg   - ABNORMAL ECG -   Atrial fibrillation   Left axis deviation   Left bundle branch block   ST elevation secondary to IVCD "   When compared with ECG of 02-Dec-2023 5:30:00,   No significant change   Electronically Signed By:    Date and Time of Study: 2023-12-03 05:23:15      ECG 12 Lead Drug Monitoring; Amiodarone   Preliminary Result   HEART RATE= 89  bpm   RR Interval= 673  ms   ID Interval=   ms   P Horizontal Axis=   deg   P Front Axis=   deg   QRSD Interval= 148  ms   QT Interval= 413  ms   QTcB= 503  ms   QRS Axis= -48  deg   T Wave Axis= 139  deg   - ABNORMAL ECG -   Atrial fibrillation   Left bundle branch block   ST elevation secondary to IVCD   Electronically Signed By:    Date and Time of Study: 2023-12-02 05:30:00      SCANNED - TELEMETRY     Final Result      SCANNED - TELEMETRY     Final Result      SCANNED - TELEMETRY     Final Result      ECG 12 Lead Drug Monitoring; Amiodarone    (Results Pending)   ECG 12 Lead Pre-Op / Pre-Procedure    (Results Pending)         Echocardiogram:    Results for orders placed during the hospital encounter of 11/24/23    Adult Transthoracic Echo Complete W/ Color, Spectral and Contrast if Necessary Per Protocol    Interpretation Summary    The left ventricular cavity is moderately dilated.    Left ventricular diastolic function is consistent with (grade II w/high LAP) pseudonormalization.    The right ventricular cavity is borderline dilated.    Left atrial volume is mildly increased.    Estimated right ventricular systolic pressure from tricuspid regurgitation is mildly elevated (35-45 mmHg).    Mild pulmonary hypertension is present.    Regional abnormalities as described  EF 15 to 20% severely reduced  [id number] variability with bundle branch block as well as underlying atrial fibrillation        Stress Test:  Results for orders placed during the hospital encounter of 11/24/23    Stress Test With Myocardial Perfusion One Day    Interpretation Summary    Left ventricular ejection fraction is severely reduced (Calculated EF = 22%).    Abnormal LV wall motion consistent with severe  hypokinesis.    Myocardial perfusion imaging indicates a moderate-sized infarct located in the anterior wall, septal wall and apex with moderate brad-infarct ischemia.    Impressions are consistent with a high risk study.    There is no prior study available for comparison. Resting and stress images were compared Resting images demonstrate decreased counts in the mid to apical anterior wall and anteroseptum consistent with prior MI Stress images demonstrate worsening of brad-infarct ischemia at least moderate in the anteroseptum, anterior wall apex and inferoseptum.  EF 22%, dilated ventricle  mL Abnormal study.    Findings consistent with an equivocal ECG stress test.    Abnormal study  Moderate brad-infarct ischemia in the septum anterior wall and apex  Severely reduced EF 22%  Underlying atrial fibrillation with left bundle branch block  Dilated ventricle  Intermediate/ high risk study         Cardiac Catheterization:  Results for orders placed during the hospital encounter of 12/01/23    Cardiac Catheterization/Vascular Study    Narrative  Primary operative Truong Ortiz MD, PhD  [facility] cardiology  Date of service 12-1-2023    Procedure  1.  Left heart catheterization coronary angiography left ventriculography on MENESES position  2.  Shockwave balloon lithotripsy to the mid LAD diagonal bifurcation 2.5 x 12 utilization of 80 pulses circumferential calcium at least 180 degrees  3.,  Bifurcation PCI, 2.5 x 12 Xience drug-eluting stent postdilated at 20 dinh to the ostial proximal diagonal branch at 3 mm  4.,  LAD PCI 2.5 x 28 Xience drug-eluting stent in the midportion spanning the diagonal bifurcation, deployment at 20 dinh postdilatation 3.25 x 12 NC balloon at 18 to 20 dinh and distal to proximal fashion    Reduction of stenosis ostial diagonal branch 10% residual, less than 10% residual stenosis of LAD, HONORIO-3 flow pre and post, no distal wire trauma edge dissection or other complication    After  informed consent patient brought to the Cath Lab sterilely prepped and draped in usual fashion exposure of the right groin for right common femoral arterial access via micropuncture and modified Seldinger technique with placement of a 6 Greenlandic sheath.  A 3 5 guidewires advanced aortic valve followed by diagnostic JL 4 and JR4 catheters for selective left and right coronary angiography respectively.  JR4 was used to cross aortic valve followed by hand-injection for LV gram EDP assessment and pullback assessment of the transaortic valve gradient.  There was obstructive disease in the mid LAD at the diagonal bifurcation at least 80% to 90% lara 1/1/1 morphology with 180 degrees of circumferential calcium.  There was progression year-over-year from last year comparative and angiography.  He has a newly reduced EF of 20%, previously 50% with abnormal stress test demonstrating reversibility of the anterior wall apex and septum.  Decision made for intervention.  Patient patient heparinized with 100 units/kg of heparin with repeated boluses maintain ACT greater than 250 on background therapy of aspirin.  A 7 Greenlandic sheath was then upsized along with 7 Greenlandic EBU 4.0 guide to engage left main, run-through wire to the diagonal branch and a BMW wire to the LAD, given calcification and bifurcation morphology, shockwave balloon lithotripsy with 2.5 x 12 balloon was performed with 80 pulses in the LAD.  We attempted to perform also of the ostial proximal diagonal branch however secondary wire wrapped we were unable to advance the deployed shockwave balloon.  This was ultimately removed followed by predilation of the ostial proximal diagonal segment with 2.5 x 12 NC balloon at 18 dinh with full expansion, further predilation was also performed in the LAD similarly, a 2.5 x 12 Xience drug stent was positioned the ostial proximal diagonal branch and a 2.5 x 28 Xience negative stent in the mid LAD, the diagonal lumen was deployed at  16 dinh followed by pullback of the stent balloon for postdilatation at 20 to 22 dinh for 30 seconds with great angiographic results, mild stepdown distally but otherwise much improvement of the ostial proximal segment.  This was removed followed by deployment of the LAD stent at 20 dinh which was viewed to be slightly undersized with improved runoff after predilation and nitroglycerin.  This was reinflated 22 dinh followed by postdilatation with 3.25 x 12 noncompliant balloon at 18 dinh and distal to proximal fashion with great angiographic results, 10% residual stenosis of the lesion, 20% residual stenosis in the ostial proximal diagonal branch.  Proximal optimization was performed as well at 22 dinh above the diagonal takeoff with great results.  No distal wire trauma edge dissection or complications.  6 Azeri Angio-Seal was used to close right, from arteriotomy with immediate hemostasis and maintenance of distal pulses.  He left Cath Lab chest pain-free hemodynamically electrically stable alert talkative staff neurologically gross intact bilaterally    Findings  1.  Opening aortic pressure of 109/62, closing pressure was unchanged  2.,  No LV gram performed to conserve contrast, LVEDP elevated 23-24  3.  Normal transorbital gradient on pullback    Angiography  1 left main normal no disease  2.  The LAD is a medium caliber vessel with 80 to 90% disease at the bifurcation including the ostial proximal diagonal branch which is also 80 to 90%.  Remainder the vessel diffuse luminal irregularities only 20%  3.  Circumflex large caliber nondominant vessel with minimal angiographic disease throughout less than 10%  Over 4 RCA is a large-caliber dominant vessel, diffuse luminal irregularities less than 20% throughout    Conclusions recommendations  1 successful bifurcation stenting, 10% residual stenosis in each vessel at the bifurcation status post shockwave balloon lithotripsy and high-pressure postdilatation and proximal  optimization as described  2.  Aspirin and P2 Y12 B inhibitor will be continued with cessation of aspirin in 30 days.  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 Ortiz on 12/1/2023  Plan  - Continue baby aspirin and Plavix  - Also on anticoagulation for atrial fibrillation, continue triple therapy while in the hospital, will be discharged on NOAC plus Plavix for 1 year followed by NOAC plus aspirin indefinitely.     Atrial fibrillation  Relative rate controlled for now  Given significant cardiomyopathy, will plan for restoration of sinus rhythm  Plan  - N.p.o. after midnight for BLANCHE cardioversion tomorrow  - Continue apixaban 5 mg twice daily  - Continue digoxin for rate control, can likely stop after cardioversion.  - Status post amiodarone infusion, continue amiodarone 400 mg twice daily for 2 weeks followed by maintenance dose.  -EP following  -Not a candidate for beta-blocker as per Dr. Ortiz notes.        Heart failure with severe reduced ejection fraction  Left bundle branch block  Optimize GDMT for heart failure with severely reduced ejection fraction  - Start Entresto 24-26 mg twice daily.  Stop valsartan.  - Outpatient evaluation for biventricular ICD  - Restoration of  sinus rhythm as above  - Initiate MRA and SGLT2 as per clinical course.             EMR Dragon/Transcription disclaimer:  Much of this encounter note is an electronic transcription/translation of spoken language to printed text. The electronic translation of spoken language may permit erroneous, or at times, nonsensical words or phrases to be inadvertently transcribed; Although I have reviewed the note for such errors, some may still exist.    Lynsey Vaca MD  12/03/23  12:00 EST

## 2023-12-03 NOTE — PLAN OF CARE
Goal Outcome Evaluation:      Patients site is dry,clean, and intact. Bruising present around site. Patients cardiac rhythm afib and sometimes tachycardic. Call light within reach and patient has no questions or concerns at this time.

## 2023-12-03 NOTE — CASE MANAGEMENT/SOCIAL WORK
Continued Stay Note  YOLANDA Rockwell     Patient Name: Dillon Aaron  MRN: [id number]  Today's Date: 12/3/2023    Admit Date: 12/1/2023        Discharge Plan       Row Name 12/03/23 0815       Plan    Plan Comments DC barriers: Cardiology following, Intermittent afib, plan for BLANCHE/cardioversion tomorrow 12/4.

## 2023-12-03 NOTE — PLAN OF CARE
Pt is A&OX4 and pleasant, pt is scheduled for a BLANCHE with cardioversion in the morning, consent form was signed by the pt and placed in the chart. Pt heart rate has been between 70's-90's with occasional tachycardia in the 120's with activity. Pt remains in afib. VSS otherwise, call light is within reach.  Problem: Adult Inpatient Plan of Care  Goal: Plan of Care Review  Outcome: Ongoing, Progressing  Goal: Patient-Specific Goal (Individualized)  Outcome: Ongoing, Progressing  Goal: Absence of Hospital-Acquired Illness or Injury  Outcome: Ongoing, Progressing  Intervention: Identify and Manage Fall Risk  Recent Flowsheet Documentation  Taken 12/3/2023 1400 by Susan Flowers LPN  Safety Promotion/Fall Prevention:   assistive device/personal items within reach   clutter free environment maintained   nonskid shoes/slippers when out of bed   safety round/check completed  Taken 12/3/2023 1200 by Susan Flowers LPN  Safety Promotion/Fall Prevention:   assistive device/personal items within reach   clutter free environment maintained   fall prevention program maintained   gait belt   nonskid shoes/slippers when out of bed   safety round/check completed  Taken 12/3/2023 1000 by Susan Flowers LPN  Safety Promotion/Fall Prevention:   activity supervised   assistive device/personal items within reach   clutter free environment maintained   fall prevention program maintained   gait belt   nonskid shoes/slippers when out of bed   safety round/check completed  Taken 12/3/2023 0800 by Susan Flowers LPN  Safety Promotion/Fall Prevention:   assistive device/personal items within reach   activity supervised   clutter free environment maintained   gait belt   fall prevention program maintained   nonskid shoes/slippers when out of bed   safety round/check completed  Intervention: Prevent Skin Injury  Recent Flowsheet Documentation  Taken 12/3/2023 1200 by Susan Flowers LPN  Skin Protection: adhesive use limited  Taken 12/3/2023  0800 by Susan Flowers LPN  Skin Protection: adhesive use limited  Intervention: Prevent and Manage VTE (Venous Thromboembolism) Risk  Recent Flowsheet Documentation  Taken 12/3/2023 1200 by Susan Flowers LPN  Range of Motion: active ROM (range of motion) encouraged  Taken 12/3/2023 0800 by Susan Flowers LPN  Range of Motion: active ROM (range of motion) encouraged  Intervention: Prevent Infection  Recent Flowsheet Documentation  Taken 12/3/2023 1400 by Susan Flowers LPN  Infection Prevention:   hand hygiene promoted   single patient room provided  Taken 12/3/2023 1200 by Susan Flowers LPN  Infection Prevention:   hand hygiene promoted   single patient room provided  Taken 12/3/2023 1000 by Susan Flowers LPN  Infection Prevention:   hand hygiene promoted   single patient room provided  Taken 12/3/2023 0800 by Susan Flowers LPN  Infection Prevention:   hand hygiene promoted   single patient room provided  Goal: Optimal Comfort and Wellbeing  Outcome: Ongoing, Progressing  Intervention: Provide Person-Centered Care  Recent Flowsheet Documentation  Taken 12/3/2023 1200 by Susan Flowers LPN  Trust Relationship/Rapport:   care explained   thoughts/feelings acknowledged   reassurance provided   questions encouraged   questions answered  Taken 12/3/2023 0800 by Susan Flowers LPN  Trust Relationship/Rapport:   care explained   thoughts/feelings acknowledged   reassurance provided   questions encouraged   questions answered  Goal: Readiness for Transition of Care  Outcome: Ongoing, Progressing     Problem: Heart Failure Comorbidity  Goal: Maintenance of Heart Failure Symptom Control  Outcome: Ongoing, Progressing  Intervention: Maintain Heart Failure-Management  Recent Flowsheet Documentation  Taken 12/3/2023 1400 by Susan Flowers LPN  Medication Review/Management: medications reviewed  Taken 12/3/2023 1200 by Susan Flowers LPN  Medication Review/Management: medications reviewed  Taken 12/3/2023 1000 by  Susan Flowers LPN  Medication Review/Management: medications reviewed  Taken 12/3/2023 0800 by Susan Flowers LPN  Medication Review/Management: medications reviewed     Problem: Hypertension Comorbidity  Goal: Blood Pressure in Desired Range  Outcome: Ongoing, Progressing  Intervention: Maintain Blood Pressure Management  Recent Flowsheet Documentation  Taken 12/3/2023 1400 by Susan Flowers LPN  Medication Review/Management: medications reviewed  Taken 12/3/2023 1200 by Susan Flowers LPN  Medication Review/Management: medications reviewed  Taken 12/3/2023 1000 by Susan Flowers LPN  Medication Review/Management: medications reviewed  Taken 12/3/2023 0800 by Susan Flowers LPN  Medication Review/Management: medications reviewed     Problem: Obstructive Sleep Apnea Risk or Actual Comorbidity Management  Goal: Unobstructed Breathing During Sleep  Outcome: Ongoing, Progressing     Problem: Fall Injury Risk  Goal: Absence of Fall and Fall-Related Injury  Outcome: Ongoing, Progressing  Intervention: Identify and Manage Contributors  Recent Flowsheet Documentation  Taken 12/3/2023 1400 by Susan Flowers LPN  Medication Review/Management: medications reviewed  Taken 12/3/2023 1200 by Susan Flowers LPN  Medication Review/Management: medications reviewed  Taken 12/3/2023 1000 by Susan Flowers LPN  Medication Review/Management: medications reviewed  Taken 12/3/2023 0800 by Susan Flowers LPN  Medication Review/Management: medications reviewed  Intervention: Promote Injury-Free Environment  Recent Flowsheet Documentation  Taken 12/3/2023 1400 by Susan Flowers LPN  Safety Promotion/Fall Prevention:   assistive device/personal items within reach   clutter free environment maintained   nonskid shoes/slippers when out of bed   safety round/check completed  Taken 12/3/2023 1200 by Susan Flowers LPN  Safety Promotion/Fall Prevention:   assistive device/personal items within reach   clutter free environment  maintained   fall prevention program maintained   gait belt   nonskid shoes/slippers when out of bed   safety round/check completed  Taken 12/3/2023 1000 by Susan Flowers LPN  Safety Promotion/Fall Prevention:   activity supervised   assistive device/personal items within reach   clutter free environment maintained   fall prevention program maintained   gait belt   nonskid shoes/slippers when out of bed   safety round/check completed  Taken 12/3/2023 0800 by Susan Flowers LPN  Safety Promotion/Fall Prevention:   assistive device/personal items within reach   activity supervised   clutter free environment maintained   gait belt   fall prevention program maintained   nonskid shoes/slippers when out of bed   safety round/check completed     Problem: Adult Inpatient Plan of Care  Goal: Plan of Care Review  Outcome: Ongoing, Progressing  Goal: Patient-Specific Goal (Individualized)  Outcome: Ongoing, Progressing  Goal: Absence of Hospital-Acquired Illness or Injury  Outcome: Ongoing, Progressing  Intervention: Identify and Manage Fall Risk  Recent Flowsheet Documentation  Taken 12/3/2023 1400 by Susan Flowers LPN  Safety Promotion/Fall Prevention:   assistive device/personal items within reach   clutter free environment maintained   nonskid shoes/slippers when out of bed   safety round/check completed  Taken 12/3/2023 1200 by Susan Flowers LPN  Safety Promotion/Fall Prevention:   assistive device/personal items within reach   clutter free environment maintained   fall prevention program maintained   gait belt   nonskid shoes/slippers when out of bed   safety round/check completed  Taken 12/3/2023 1000 by Susan Flowers LPN  Safety Promotion/Fall Prevention:   activity supervised   assistive device/personal items within reach   clutter free environment maintained   fall prevention program maintained   gait belt   nonskid shoes/slippers when out of bed   safety round/check completed  Taken 12/3/2023 0800 by Lionel  April M, LPN  Safety Promotion/Fall Prevention:   assistive device/personal items within reach   activity supervised   clutter free environment maintained   gait belt   fall prevention program maintained   nonskid shoes/slippers when out of bed   safety round/check completed  Intervention: Prevent Skin Injury  Recent Flowsheet Documentation  Taken 12/3/2023 1200 by Susan Flowers LPN  Skin Protection: adhesive use limited  Taken 12/3/2023 0800 by Susan Flowers LPN  Skin Protection: adhesive use limited  Intervention: Prevent and Manage VTE (Venous Thromboembolism) Risk  Recent Flowsheet Documentation  Taken 12/3/2023 1200 by Susan Flowers LPN  Range of Motion: active ROM (range of motion) encouraged  Taken 12/3/2023 0800 by Susan Flowers LPN  Range of Motion: active ROM (range of motion) encouraged  Intervention: Prevent Infection  Recent Flowsheet Documentation  Taken 12/3/2023 1400 by Susan Flowers LPN  Infection Prevention:   hand hygiene promoted   single patient room provided  Taken 12/3/2023 1200 by Susan Flowers LPN  Infection Prevention:   hand hygiene promoted   single patient room provided  Taken 12/3/2023 1000 by Susan Flowers LPN  Infection Prevention:   hand hygiene promoted   single patient room provided  Taken 12/3/2023 0800 by Susan Flowers LPN  Infection Prevention:   hand hygiene promoted   single patient room provided  Goal: Optimal Comfort and Wellbeing  Outcome: Ongoing, Progressing  Intervention: Provide Person-Centered Care  Recent Flowsheet Documentation  Taken 12/3/2023 1200 by Susan Flowers LPN  Trust Relationship/Rapport:   care explained   thoughts/feelings acknowledged   reassurance provided   questions encouraged   questions answered  Taken 12/3/2023 0800 by Susan Flowers LPN  Trust Relationship/Rapport:   care explained   thoughts/feelings acknowledged   reassurance provided   questions encouraged   questions answered  Goal: Readiness for Transition of Care  Outcome:  Ongoing, Progressing     Problem: Heart Failure Comorbidity  Goal: Maintenance of Heart Failure Symptom Control  Outcome: Ongoing, Progressing  Intervention: Maintain Heart Failure-Management  Recent Flowsheet Documentation  Taken 12/3/2023 1400 by Susan Flowers LPN  Medication Review/Management: medications reviewed  Taken 12/3/2023 1200 by Susan Flowers LPN  Medication Review/Management: medications reviewed  Taken 12/3/2023 1000 by Susan Flowers LPN  Medication Review/Management: medications reviewed  Taken 12/3/2023 0800 by Susan Flowers LPN  Medication Review/Management: medications reviewed     Problem: Hypertension Comorbidity  Goal: Blood Pressure in Desired Range  Outcome: Ongoing, Progressing  Intervention: Maintain Blood Pressure Management  Recent Flowsheet Documentation  Taken 12/3/2023 1400 by Susan Flowers LPN  Medication Review/Management: medications reviewed  Taken 12/3/2023 1200 by Susan Flowers LPN  Medication Review/Management: medications reviewed  Taken 12/3/2023 1000 by Susan Flowers LPN  Medication Review/Management: medications reviewed  Taken 12/3/2023 0800 by Susan Flowers LPN  Medication Review/Management: medications reviewed     Problem: Obstructive Sleep Apnea Risk or Actual Comorbidity Management  Goal: Unobstructed Breathing During Sleep  Outcome: Ongoing, Progressing     Problem: Fall Injury Risk  Goal: Absence of Fall and Fall-Related Injury  Outcome: Ongoing, Progressing  Intervention: Identify and Manage Contributors  Recent Flowsheet Documentation  Taken 12/3/2023 1400 by Susan Flowers LPN  Medication Review/Management: medications reviewed  Taken 12/3/2023 1200 by Susan Flowers LPN  Medication Review/Management: medications reviewed  Taken 12/3/2023 1000 by Susan Flowers LPN  Medication Review/Management: medications reviewed  Taken 12/3/2023 0800 by Susan Flowers LPN  Medication Review/Management: medications reviewed  Intervention: Promote Injury-Free  Environment  Recent Flowsheet Documentation  Taken 12/3/2023 1400 by Susan Flowers LPN  Safety Promotion/Fall Prevention:   assistive device/personal items within reach   clutter free environment maintained   nonskid shoes/slippers when out of bed   safety round/check completed  Taken 12/3/2023 1200 by Susan Flowers LPN  Safety Promotion/Fall Prevention:   assistive device/personal items within reach   clutter free environment maintained   fall prevention program maintained   gait belt   nonskid shoes/slippers when out of bed   safety round/check completed  Taken 12/3/2023 1000 by Susan Flowers LPN  Safety Promotion/Fall Prevention:   activity supervised   assistive device/personal items within reach   clutter free environment maintained   fall prevention program maintained   gait belt   nonskid shoes/slippers when out of bed   safety round/check completed  Taken 12/3/2023 0800 by Susan Flowers LPN  Safety Promotion/Fall Prevention:   assistive device/personal items within reach   activity supervised   clutter free environment maintained   gait belt   fall prevention program maintained   nonskid shoes/slippers when out of bed   safety round/check completed   Goal Outcome Evaluation:

## 2023-12-04 ENCOUNTER — ANESTHESIA (OUTPATIENT)
Dept: CARDIOLOGY | Facility: HOSPITAL | Age: 68
DRG: 324 | End: 2023-12-04
Payer: MEDICARE

## 2023-12-04 ENCOUNTER — APPOINTMENT (OUTPATIENT)
Dept: RESPIRATORY THERAPY | Facility: HOSPITAL | Age: 68
DRG: 324 | End: 2023-12-04
Payer: MEDICARE

## 2023-12-04 ENCOUNTER — ANESTHESIA EVENT (OUTPATIENT)
Dept: CARDIOLOGY | Facility: HOSPITAL | Age: 68
DRG: 324 | End: 2023-12-04
Payer: MEDICARE

## 2023-12-04 ENCOUNTER — HOSPITAL ENCOUNTER (OUTPATIENT)
Dept: CARDIOLOGY | Facility: HOSPITAL | Age: 68
Discharge: HOME | DRG: 324 | End: 2023-12-04
Payer: MEDICARE

## 2023-12-04 ENCOUNTER — PREP FOR SURGERY (OUTPATIENT)
Dept: OTHER | Facility: HOSPITAL | Age: 68
End: 2023-12-04
Payer: MEDICARE

## 2023-12-04 VITALS
RESPIRATION RATE: 18 BRPM | WEIGHT: 252.43 LBS | OXYGEN SATURATION: 96 % | TEMPERATURE: 98.1 F | HEIGHT: 70 IN | SYSTOLIC BLOOD PRESSURE: 114 MMHG | HEART RATE: 72 BPM | DIASTOLIC BLOOD PRESSURE: 74 MMHG | BODY MASS INDEX: 36.14 KG/M2

## 2023-12-04 VITALS — SYSTOLIC BLOOD PRESSURE: 118 MMHG | OXYGEN SATURATION: 98 % | DIASTOLIC BLOOD PRESSURE: 81 MMHG | TEMPERATURE: 96.8 F

## 2023-12-04 VITALS
SYSTOLIC BLOOD PRESSURE: 120 MMHG | BODY MASS INDEX: 37.55 KG/M2 | HEIGHT: 69 IN | TEMPERATURE: 98.5 F | OXYGEN SATURATION: 93 % | HEART RATE: 80 BPM | RESPIRATION RATE: 14 BRPM | WEIGHT: 253.53 LBS | DIASTOLIC BLOOD PRESSURE: 82 MMHG

## 2023-12-04 DIAGNOSIS — I48.19 PERSISTENT ATRIAL FIBRILLATION: Primary | ICD-10-CM

## 2023-12-04 DIAGNOSIS — I48.19 PERSISTENT ATRIAL FIBRILLATION: ICD-10-CM

## 2023-12-04 LAB — BH CV ECHO SHUNT ASSESSMENT PERFORMED (HIDDEN SCRIPTING): 1

## 2023-12-04 PROCEDURE — 93010 ELECTROCARDIOGRAM REPORT: CPT | Performed by: INTERNAL MEDICINE

## 2023-12-04 PROCEDURE — 93312 ECHO TRANSESOPHAGEAL: CPT

## 2023-12-04 PROCEDURE — 25010000002 PROPOFOL 10 MG/ML EMULSION: Performed by: NURSE ANESTHETIST, CERTIFIED REGISTERED

## 2023-12-04 PROCEDURE — 25810000003 SODIUM CHLORIDE 0.9 % SOLUTION: Performed by: NURSE ANESTHETIST, CERTIFIED REGISTERED

## 2023-12-04 PROCEDURE — 93246 EXT ECG>7D<15D RECORDING: CPT

## 2023-12-04 PROCEDURE — 5A2204Z RESTORATION OF CARDIAC RHYTHM, SINGLE: ICD-10-PCS | Performed by: INTERNAL MEDICINE

## 2023-12-04 PROCEDURE — 93325 DOPPLER ECHO COLOR FLOW MAPG: CPT | Performed by: STUDENT IN AN ORGANIZED HEALTH CARE EDUCATION/TRAINING PROGRAM

## 2023-12-04 PROCEDURE — 92960 CARDIOVERSION ELECTRIC EXT: CPT | Performed by: STUDENT IN AN ORGANIZED HEALTH CARE EDUCATION/TRAINING PROGRAM

## 2023-12-04 PROCEDURE — 93005 ELECTROCARDIOGRAM TRACING: CPT | Performed by: INTERNAL MEDICINE

## 2023-12-04 PROCEDURE — 93320 DOPPLER ECHO COMPLETE: CPT

## 2023-12-04 PROCEDURE — 93312 ECHO TRANSESOPHAGEAL: CPT | Performed by: STUDENT IN AN ORGANIZED HEALTH CARE EDUCATION/TRAINING PROGRAM

## 2023-12-04 PROCEDURE — 93325 DOPPLER ECHO COLOR FLOW MAPG: CPT

## 2023-12-04 PROCEDURE — 93320 DOPPLER ECHO COMPLETE: CPT | Performed by: STUDENT IN AN ORGANIZED HEALTH CARE EDUCATION/TRAINING PROGRAM

## 2023-12-04 PROCEDURE — 93005 ELECTROCARDIOGRAM TRACING: CPT | Performed by: STUDENT IN AN ORGANIZED HEALTH CARE EDUCATION/TRAINING PROGRAM

## 2023-12-04 PROCEDURE — 92960 CARDIOVERSION ELECTRIC EXT: CPT

## 2023-12-04 RX ORDER — CLOPIDOGREL BISULFATE 75 MG/1
75 TABLET ORAL DAILY
Qty: 30 TABLET | Refills: 6 | Status: SHIPPED | OUTPATIENT
Start: 2023-12-05 | End: 2023-12-05 | Stop reason: SDUPTHER

## 2023-12-04 RX ORDER — DIPHENHYDRAMINE HYDROCHLORIDE 50 MG/ML
12.5 INJECTION INTRAMUSCULAR; INTRAVENOUS
OUTPATIENT
Start: 2023-12-04

## 2023-12-04 RX ORDER — SODIUM CHLORIDE 9 MG/ML
INJECTION, SOLUTION INTRAVENOUS CONTINUOUS PRN
Status: DISCONTINUED | OUTPATIENT
Start: 2023-12-04 | End: 2023-12-04 | Stop reason: SURG

## 2023-12-04 RX ORDER — LABETALOL HYDROCHLORIDE 5 MG/ML
5 INJECTION, SOLUTION INTRAVENOUS
OUTPATIENT
Start: 2023-12-04

## 2023-12-04 RX ORDER — AMIODARONE HYDROCHLORIDE 200 MG/1
200 TABLET ORAL 2 TIMES DAILY
Qty: 60 TABLET | Refills: 0 | Status: SHIPPED | OUTPATIENT
Start: 2023-12-10 | End: 2024-01-09

## 2023-12-04 RX ORDER — MEPERIDINE HYDROCHLORIDE 25 MG/ML
12.5 INJECTION INTRAMUSCULAR; INTRAVENOUS; SUBCUTANEOUS
OUTPATIENT
Start: 2023-12-04 | End: 2023-12-05

## 2023-12-04 RX ORDER — IPRATROPIUM BROMIDE AND ALBUTEROL SULFATE 2.5; .5 MG/3ML; MG/3ML
3 SOLUTION RESPIRATORY (INHALATION) ONCE AS NEEDED
OUTPATIENT
Start: 2023-12-04

## 2023-12-04 RX ORDER — ONDANSETRON 2 MG/ML
4 INJECTION INTRAMUSCULAR; INTRAVENOUS ONCE AS NEEDED
OUTPATIENT
Start: 2023-12-04

## 2023-12-04 RX ORDER — AMIODARONE HYDROCHLORIDE 400 MG/1
400 TABLET ORAL EVERY 12 HOURS SCHEDULED
Qty: 10 TABLET | Refills: 0 | Status: SHIPPED | OUTPATIENT
Start: 2023-12-04 | End: 2023-12-05 | Stop reason: SDUPTHER

## 2023-12-04 RX ORDER — METOPROLOL SUCCINATE 25 MG/1
12.5 TABLET, EXTENDED RELEASE ORAL DAILY
Qty: 30 TABLET | Refills: 11 | Status: SHIPPED | OUTPATIENT
Start: 2023-12-04 | End: 2023-12-05 | Stop reason: SDUPTHER

## 2023-12-04 RX ORDER — LIDOCAINE HYDROCHLORIDE 20 MG/ML
INJECTION, SOLUTION EPIDURAL; INFILTRATION; INTRACAUDAL; PERINEURAL AS NEEDED
Status: DISCONTINUED | OUTPATIENT
Start: 2023-12-04 | End: 2023-12-04 | Stop reason: SURG

## 2023-12-04 RX ORDER — PHENYLEPHRINE HCL IN 0.9% NACL 1 MG/10 ML
SYRINGE (ML) INTRAVENOUS AS NEEDED
Status: DISCONTINUED | OUTPATIENT
Start: 2023-12-04 | End: 2023-12-04 | Stop reason: SURG

## 2023-12-04 RX ORDER — EPHEDRINE SULFATE 5 MG/ML
5 INJECTION INTRAVENOUS ONCE AS NEEDED
OUTPATIENT
Start: 2023-12-04

## 2023-12-04 RX ORDER — HYDRALAZINE HYDROCHLORIDE 20 MG/ML
5 INJECTION INTRAMUSCULAR; INTRAVENOUS
OUTPATIENT
Start: 2023-12-04

## 2023-12-04 RX ORDER — NITROGLYCERIN 0.4 MG/1
0.4 TABLET SUBLINGUAL
Qty: 100 TABLET | Refills: 12 | Status: SHIPPED | OUTPATIENT
Start: 2023-12-04

## 2023-12-04 RX ORDER — PROPOFOL 10 MG/ML
VIAL (ML) INTRAVENOUS AS NEEDED
Status: DISCONTINUED | OUTPATIENT
Start: 2023-12-04 | End: 2023-12-04 | Stop reason: SURG

## 2023-12-04 RX ADMIN — PROPOFOL 10 MG: 10 INJECTION, EMULSION INTRAVENOUS at 13:16

## 2023-12-04 RX ADMIN — DIGOXIN 125 MCG: 125 TABLET ORAL at 16:40

## 2023-12-04 RX ADMIN — PROPOFOL 40 MG: 10 INJECTION, EMULSION INTRAVENOUS at 13:21

## 2023-12-04 RX ADMIN — AMIODARONE HYDROCHLORIDE 400 MG: 200 TABLET ORAL at 19:26

## 2023-12-04 RX ADMIN — SACUBITRIL AND VALSARTAN 1 TABLET: 24; 26 TABLET, FILM COATED ORAL at 19:26

## 2023-12-04 RX ADMIN — SACUBITRIL AND VALSARTAN 1 TABLET: 24; 26 TABLET, FILM COATED ORAL at 08:42

## 2023-12-04 RX ADMIN — LIDOCAINE HYDROCHLORIDE 60 MG: 20 INJECTION, SOLUTION EPIDURAL; INFILTRATION; INTRACAUDAL; PERINEURAL at 13:13

## 2023-12-04 RX ADMIN — Medication 100 MCG: at 13:22

## 2023-12-04 RX ADMIN — CLOPIDOGREL BISULFATE 75 MG: 75 TABLET ORAL at 08:37

## 2023-12-04 RX ADMIN — PROPOFOL 40 MG: 10 INJECTION, EMULSION INTRAVENOUS at 13:19

## 2023-12-04 RX ADMIN — PROPOFOL 20 MG: 10 INJECTION, EMULSION INTRAVENOUS at 13:17

## 2023-12-04 RX ADMIN — ASPIRIN 81 MG CHEWABLE TABLET 81 MG: 81 TABLET CHEWABLE at 08:37

## 2023-12-04 RX ADMIN — MELOXICAM 15 MG: 15 TABLET ORAL at 08:37

## 2023-12-04 RX ADMIN — FINASTERIDE 5 MG: 5 TABLET, FILM COATED ORAL at 08:38

## 2023-12-04 RX ADMIN — APIXABAN 5 MG: 5 TABLET, FILM COATED ORAL at 19:26

## 2023-12-04 RX ADMIN — APIXABAN 5 MG: 5 TABLET, FILM COATED ORAL at 08:38

## 2023-12-04 RX ADMIN — SODIUM CHLORIDE: 9 INJECTION, SOLUTION INTRAVENOUS at 13:08

## 2023-12-04 RX ADMIN — PROPOFOL 20 MG: 10 INJECTION, EMULSION INTRAVENOUS at 13:15

## 2023-12-04 RX ADMIN — PROPOFOL 20 MG: 10 INJECTION, EMULSION INTRAVENOUS at 13:23

## 2023-12-04 RX ADMIN — AMIODARONE HYDROCHLORIDE 400 MG: 200 TABLET ORAL at 08:37

## 2023-12-04 RX ADMIN — PROPOFOL 50 MG: 10 INJECTION, EMULSION INTRAVENOUS at 13:13

## 2023-12-04 NOTE — PROGRESS NOTES
"    Reason for follow-up: Atrial fibrillation     Patient Care Team:  Tre Burkett MD as PCP - General    Subjective .   Dillon Aaron is doing well today     ROS    Patient has no known allergies.    Scheduled Meds:amiodarone, 400 mg, Oral, Q12H  apixaban, 5 mg, Oral, Q12H  aspirin, 81 mg, Oral, Daily  clopidogrel, 75 mg, Oral, Daily  digoxin, 125 mcg, Oral, Daily  finasteride, 5 mg, Oral, Daily  meloxicam, 15 mg, Oral, Daily  rosuvastatin, 20 mg, Oral, Nightly  sacubitril-valsartan, 1 tablet, Oral, Q12H      Continuous Infusions:   PRN Meds:.  acetaminophen    nitroglycerin      VITAL SIGNS  Vitals:    12/03/23 2100 12/04/23 0100 12/04/23 0500 12/04/23 0932   BP: 116/80 113/83 129/85 122/84   BP Location: Left arm Left arm Left arm Right arm   Patient Position: Lying Lying Lying Lying   Pulse: 83 102 97 110   Resp: 16 18 18 18   Temp: 98.1 °F (36.7 °C) 97.9 °F (36.6 °C) 97.5 °F (36.4 °C) 98.1 °F (36.7 °C)   TempSrc: Oral Oral Oral Oral   SpO2:       Weight:       Height:           Flowsheet Rows      Flowsheet Row First Filed Value   Admission Height 177.8 cm (70\") Documented at 12/01/2023 1055   Admission Weight 114 kg (251 lb 1.7 oz) Documented at 12/01/2023 1055               Physical Exam  VITALS REVIEWED    General:      well developed, in no acute distress.    Head:      normocephalic and atraumatic.    Eyes:      PERRL/EOM intact, conjunctiva and sclera clear with out nystagmus.    Neck:      no masses, thyromegaly,  trachea central with normal respiratory effort and PMI displaced laterally  Lungs:      Clear  Heart:       Regular rate and rhythm  Msk:      no deformity or scoliosis noted of thoracic or lumbar spine.    Pulses:      pulses normal in all 4 extremities.    Extremities:       No lower extremity edema  Neurologic:      no focal deficits.   alert oriented x3  Skin:      intact without lesions or rashes.    Psych:      alert and cooperative; normal mood and affect; normal attention span and " concentration.          LAB RESULTS (LAST 7 DAYS)    CBC  Results from last 7 days   Lab Units 12/02/23 0137 12/01/23 0918   WBC 10*3/mm3 6.30 6.80   RBC 10*6/mm3 4.33 4.67   HEMOGLOBIN g/dL 13.6 14.9   HEMATOCRIT % 40.7 43.7   MCV fL 94.0 93.6   PLATELETS 10*3/mm3 168 197       BMP  Results from last 7 days   Lab Units 12/02/23 0137 12/01/23 0918   SODIUM mmol/L 140 141   POTASSIUM mmol/L 4.5 5.1   CHLORIDE mmol/L 105 105   CO2 mmol/L 25.0 29.0   BUN mg/dL 17 21   CREATININE mg/dL 1.17 1.18   GLUCOSE mg/dL 110* 112*       CMP   Results from last 7 days   Lab Units 12/02/23 0137 12/01/23 0918   SODIUM mmol/L 140 141   POTASSIUM mmol/L 4.5 5.1   CHLORIDE mmol/L 105 105   CO2 mmol/L 25.0 29.0   BUN mg/dL 17 21   CREATININE mg/dL 1.17 1.18   GLUCOSE mg/dL 110* 112*   ALBUMIN g/dL  --  4.5   BILIRUBIN mg/dL  --  1.2   ALK PHOS U/L  --  63   AST (SGOT) U/L  --  16   ALT (SGPT) U/L  --  28         BNP        TROPONIN        CoAg  Results from last 7 days   Lab Units 12/01/23 0918   INR  1.07       Creatinine Clearance  Estimated Creatinine Clearance: 76.8 mL/min (by C-G formula based on SCr of 1.17 mg/dL).    ABG          EKG    I personally reviewed the patient's EKG/Telemetry data: Sinus rhythm with left bundle branch block      Assessment & Plan       Chest pain, atypical    Left bundle branch block    Paroxysmal atrial fibrillation    Abnormal stress test    CAD (coronary artery disease)    Ischemic cardiomyopathy      Dillon Aaron is a 67-year-old male patient who has history of paroxysmal atrial fibrillation, who was in sinus rhythm in December 2022, presented to the hospital essentially for CHF but also found to have A-fib with rapid ventricular rates, he has chronic left bundle branch block.  His ejection fraction was very low and heart catheterization showed severe LAD lesion for which she received stent.  He remains in A-fib with rapid ventricular rates.  He had been on Xarelto for about a month.  I  believe that his cardiomyopathy is multifactorial, ischemic, nonischemic from A-fib with rapid ventricular rates but also dyssynchrony from left bundle branch block.     So for now we will focus on rhythm restoration, since he is symptomatic, we will go ahead and proceed with BLANCHE guided cardioversion on Monday.      Patient underwent BLANCHE guided cardioversion on 12/4/2023, currently in sinus rhythm with left bundle branch block.  Continue amiodarone and Eliquis on discharge.  He will be scheduled for outpatient ablation as discussed in consult note.  Also possible BiV ICD in the future.    I discussed the patients findings and my recommendations with patient and agrees with outlined plan.    Alvaro Nava MD  12/04/23  15:45 EST

## 2023-12-04 NOTE — DISCHARGE INSTRUCTIONS
Rest and relax today  no lifting over 10 lbs x 1 week  no driving for 24 hrs  no tub baths or hot tubs for 1 week  no stairs for 3 days  monitor right groin for s/s of bleeding      For 1 month you will be on aspirin, plavix and eliquis, after 1 month you may stop aspirin and continue only plavix and Eliquis.    Amiodarone dosage will be 400mg twice / day for 5 days then reduce dose to 200mg twice / day (to start on 12/10/2023)

## 2023-12-04 NOTE — PLAN OF CARE
Goal Outcome Evaluation:      Patient slept throughout the night, BLANCHE scheduled for Am, Consent is signed.

## 2023-12-04 NOTE — PROGRESS NOTES
[facility] [place]        LOS:  LOS: 1 day   Patient Name: Dillon Aaron  Age/Sex: 67 y.o. male  : 1955  MRN: [id number]    Day of Service: 23   Length of Stay: 1  Encounter Provider: JAMEE Trinidad  Place of Service: [facility]  Patient Care Team:  Tre Burkett MD as PCP - General    Subjective:     Chief Complaint: f/u HF, CAD, Afib    Subjective: patient planned for cardioversion today. EP evaluated patient, planning outpt ablation, possible BiV at later date as well pending LVEF post revascularization    Current Medications:   Scheduled Meds:amiodarone, 400 mg, Oral, Q12H  apixaban, 5 mg, Oral, Q12H  aspirin, 81 mg, Oral, Daily  clopidogrel, 75 mg, Oral, Daily  digoxin, 125 mcg, Oral, Daily  finasteride, 5 mg, Oral, Daily  meloxicam, 15 mg, Oral, Daily  rosuvastatin, 20 mg, Oral, Nightly  sacubitril-valsartan, 1 tablet, Oral, Q12H      Continuous Infusions:     Allergies:  No Known Allergies    Review of Systems   Constitutional: Negative for chills, diaphoresis and malaise/fatigue.   Cardiovascular:  Negative for chest pain, dyspnea on exertion, irregular heartbeat, leg swelling, near-syncope, orthopnea, palpitations, paroxysmal nocturnal dyspnea and syncope.   Respiratory:  Negative for cough, shortness of breath, sleep disturbances due to breathing and sputum production.    Gastrointestinal:  Negative for change in bowel habit.   Genitourinary:  Negative for urgency.   Neurological:  Negative for dizziness and headaches.   Psychiatric/Behavioral:  Negative for altered mental status.        Objective:     Temp:  [97.5 °F (36.4 °C)-98.3 °F (36.8 °C)] 98.1 °F (36.7 °C)  Heart Rate:  [] 110  Resp:  [16-18] 18  BP: (113-145)/(80-96) 122/84     Intake/Output Summary (Last 24 hours) at 2023 1147  Last data filed at 12/3/2023 1900  Gross per 24 hour   Intake 600 ml   Output --   Net 600 ml     Body mass index is 36.22 kg/m².       12/01/23  1055 12/02/23  0500 12/03/23  0500   Weight: 114 kg (251 lb 1.7 oz) 113 kg (248 lb 10.9 oz) 115 kg (252 lb 6.8 oz)         General Appearance:    Alert, cooperative, in no acute distress                                Head: Atraumatic, normocephalic, PERRLA               Neck:   supple, trachea midline, no thyromegaly, no carotid bruit, no JVD   Lungs:     Clear to auscultation, respirations regular, even and               unlabored    Heart:    irregular rhythm and normal rate, normal S1 and S2   Abdomen:     Normal bowel sounds, no masses, no organomegaly, soft  nontender, nondistended, no guarding, no rebound  tenderness   Extremities:   Moves all extremities well, no edema, no cyanosis, no  redness   Pulses:   Pulses palpable and equal bilaterally   Skin:   No bleeding, bruising or rash   Neurologic:   Awake, alert, oriented x3         Lab Review:   Results from last 7 days   Lab Units 12/02/23  0137 12/01/23  0918   SODIUM mmol/L 140 141   POTASSIUM mmol/L 4.5 5.1   CHLORIDE mmol/L 105 105   CO2 mmol/L 25.0 29.0   BUN mg/dL 17 21   CREATININE mg/dL 1.17 1.18   GLUCOSE mg/dL 110* 112*   CALCIUM mg/dL 9.0 9.8   AST (SGOT) U/L  --  16   ALT (SGPT) U/L  --  28         Results from last 7 days   Lab Units 12/02/23  0137 12/01/23  0918   WBC 10*3/mm3 6.30 6.80   HEMOGLOBIN g/dL 13.6 14.9   HEMATOCRIT % 40.7 43.7   PLATELETS 10*3/mm3 168 197     Results from last 7 days   Lab Units 12/01/23  0918   INR  1.07         Results from last 7 days   Lab Units 12/02/23  0137   CHOLESTEROL mg/dL 93   TRIGLYCERIDES mg/dL 97   HDL CHOL mg/dL 30*               Recent Radiology:  Imaging Results (Most Recent)       None            ECHOCARDIOGRAM:    Results for orders placed during the hospital encounter of 11/24/23    Adult Transthoracic Echo Complete W/ Color, Spectral and Contrast if Necessary Per Protocol    Interpretation Summary    The left ventricular cavity is moderately dilated.    Left ventricular diastolic  function is consistent with (grade II w/high LAP) pseudonormalization.    The right ventricular cavity is borderline dilated.    Left atrial volume is mildly increased.    Estimated right ventricular systolic pressure from tricuspid regurgitation is mildly elevated (35-45 mmHg).    Mild pulmonary hypertension is present.    Regional abnormalities as described  EF 15 to 20% severely reduced  [id number] variability with bundle branch block as well as underlying atrial fibrillation        I reviewed the patient's new clinical results.    EKG:      Assessment:       Chest pain, atypical    Left bundle branch block    Paroxysmal atrial fibrillation    Abnormal stress test    CAD (coronary artery disease)    Ischemic cardiomyopathy    CAD s/p PCI to LAD 12/1/2023- on ASA / Plavix  Atrial fibrillation- plan for CV today, outpt ablation, a/c with Apixiban, chads vasc at lest 3  HFrEF, chronic systolic and diastolic HF, LVEF 15-20%, grade 2 DD- entresto started, valsartan stopped,   Mild pulmonary HTN  Left bundle branch block    Plan:   Plan for CV today  Outpt ablation  On digoxin and amiodarone  Consideration for BiV ICD pending evaluation for LVEF recovery post revascularization  S/p LAD PCI- continue ASA / Plavix. Also on Eliquis, continue triple therapy x 1 mo then stop ASA and continue Plavix and Eliquis  Entresto for HF         JAMEE Trinidad  12/04/23  11:47 EST

## 2023-12-04 NOTE — ANESTHESIA POSTPROCEDURE EVALUATION
Patient: Dillon DELEON Day    Procedure Summary       Date: 12/04/23 Room / Location: [facility]    Anesthesia Start: 1308 Anesthesia Stop: 1337    Procedures:       ADULT TRANSESOPHAGEAL ECHO (BLANCHE) W/ CONT IF NECESSARY PER PROTOCOL      [facility] CARDIOLOGY DEPARTMENT Diagnosis:       Persistent atrial fibrillation      (Arrhythmia)      (afib)    Scheduled Providers: Aicha Leon MD; Lynsey Vaca MD Provider: Aicha Leon MD    Anesthesia Type: general ASA Status: 4            Anesthesia Type: general    Vitals  Vitals Value Taken Time   /82 12/04/23 1404   Temp 96.8 °F (36 °C) 12/04/23 1330   Pulse 69 12/04/23 1414   Resp 14 12/04/23 1308   SpO2 92 % 12/04/23 1414   Vitals shown include unfiled device data.        Post Anesthesia Care and Evaluation    Patient location during evaluation: PACU  Patient participation: complete - patient participated  Level of consciousness: awake and alert  Pain management: satisfactory to patient    Airway patency: patent  Anesthetic complications: No anesthetic complications  PONV Status: none  Cardiovascular status: acceptable  Respiratory status: acceptable  Hydration status: acceptable

## 2023-12-04 NOTE — CASE MANAGEMENT/SOCIAL WORK
Discharge Planning Assessment   [place]     Patient Name: Dillon Aaron  MRN: [id number]  Today's Date: 12/4/2023    Admit Date: 12/1/2023    Plan: Anticipate routine home with spouse.   Discharge Needs Assessment       Row Name 12/04/23 1116       Living Environment    People in Home spouse    Name(s) of People in Home Spouse- Mitzy    Current Living Arrangements home    Potentially Unsafe Housing Conditions none    Primary Care Provided by self    Provides Primary Care For no one    Family Caregiver if Needed spouse    Quality of Family Relationships supportive    Able to Return to Prior Arrangements yes       Resource/Environmental Concerns    Resource/Environmental Concerns none    Transportation Concerns none       Transition Planning    Patient/Family Anticipates Transition to home with family    Patient/Family Anticipated Services at Transition none    Transportation Anticipated family or friend will provide;car, drives self       Discharge Needs Assessment    Readmission Within the Last 30 Days no previous admission in last 30 days    Equipment Currently Used at Home none    Concerns to be Addressed denies needs/concerns at this time    Anticipated Changes Related to Illness none    Equipment Needed After Discharge none    Provided Post Acute Provider List? N/A                   Discharge Plan       Row Name 12/04/23 1113       Plan    Plan Anticipate routine home with spouse.    Patient/Family in Agreement with Plan yes    Plan Comments CM met with patient at bedside to discuss dc planning and IMM letter. Patient reported that he is usually independent and does not use any DME. PCP and pharmacy confirmed, reported no trouble affording medications, and declined needs at this time for any DME/HH/PT services. DC Barriers: Scheduled to have BLANCHE cardioversion performed today, 12/4.              Demographic Summary       Row Name 12/04/23 1114       General Information    Admission Type inpatient    Required  Notices Provided Important Message from Medicare    Referral Source admission list    Reason for Consult discharge planning    Preferred Language English       Contact Information    Permission Granted to Share Info With                    Functional Status       Row Name 12/04/23 1115       Functional Status    Usual Activity Tolerance good    Current Activity Tolerance good       Functional Status, IADL    Medications independent    Meal Preparation independent    Housekeeping independent    Laundry independent    Shopping independent           Makenna Russell RN     Office Phone: [phone]  Office Cell: [phone]

## 2023-12-04 NOTE — ANESTHESIA PREPROCEDURE EVALUATION
Anesthesia Evaluation     Patient summary reviewed and Nursing notes reviewed   no history of anesthetic complications:   NPO Solid Status: > 8 hours  NPO Liquid Status: > 8 hours           Airway   Mallampati: II  TM distance: >3 FB  Neck ROM: full  Dental - normal exam     Pulmonary - normal exam   (-) not a smoker  Cardiovascular - normal exam    ECG reviewed    (+) hypertension, CAD, cardiac stents (12/1/2023) Drug eluting stent within the past 12 months , dysrhythmias Atrial Fib, hyperlipidemia  (-) angina      Neuro/Psych  (-) CVA  GI/Hepatic/Renal/Endo    (+) obesity    Musculoskeletal (-) negative ROS    Abdominal    Substance History   (+) alcohol use     OB/GYN          Other        ROS/Med Hx Other: PCI 12/1/2023  Ef 15%-20% per TTE while in Afib, plan for CV                Anesthesia Plan    ASA 4     general   total IV anesthesia  intravenous induction     Anesthetic plan, risks, benefits, and alternatives have been provided, discussed and informed consent has been obtained with: patient.    Plan discussed with CRNA and CAA.    CODE STATUS:

## 2023-12-05 ENCOUNTER — TELEPHONE (OUTPATIENT)
Dept: CARDIOLOGY | Facility: CLINIC | Age: 68
End: 2023-12-05

## 2023-12-05 PROBLEM — I48.19 PERSISTENT ATRIAL FIBRILLATION: Status: ACTIVE | Noted: 2023-12-04

## 2023-12-05 RX ORDER — METOPROLOL SUCCINATE 25 MG/1
12.5 TABLET, EXTENDED RELEASE ORAL DAILY
Qty: 30 TABLET | Refills: 11 | Status: SHIPPED | OUTPATIENT
Start: 2023-12-05

## 2023-12-05 RX ORDER — CLOPIDOGREL BISULFATE 75 MG/1
75 TABLET ORAL DAILY
Qty: 30 TABLET | Refills: 6 | Status: SHIPPED | OUTPATIENT
Start: 2023-12-05

## 2023-12-05 RX ORDER — AMIODARONE HYDROCHLORIDE 400 MG/1
400 TABLET ORAL EVERY 12 HOURS SCHEDULED
Qty: 10 TABLET | Refills: 0 | Status: SHIPPED | OUTPATIENT
Start: 2023-12-05 | End: 2023-12-10

## 2023-12-05 NOTE — DISCHARGE SUMMARY
Cardiology [place]    DISCHARGE SUMMARY      Patient Name: Dillon Aaron  :1955  67 y.o.    Date of Admit: 2023  Date of Discharge:  2023    Discharge Diagnosis:  Problems Addressed this Visit          Other    Abnormal stress test    Relevant Orders    Cardiac Catheterization/Vascular Study (Completed)    * (Principal) Chest pain, atypical    Relevant Orders    Cardiac Catheterization/Vascular Study (Completed)    Ischemic cardiomyopathy - Primary    Relevant Medications    clopidogrel (PLAVIX) 75 MG tablet    nitroglycerin (NITROSTAT) 0.4 MG SL tablet    sacubitril-valsartan (ENTRESTO) 24-26 MG tablet    metoprolol succinate XL (TOPROL-XL) 25 MG 24 hr tablet    Other Relevant Orders    Basic Metabolic Panel     Diagnoses         Codes Comments    Ischemic cardiomyopathy    -  Primary ICD-10-CM: I25.5  ICD-9-CM: 414.8     Abnormal stress test     ICD-10-CM: R94.39  ICD-9-CM: 794.39     Chest pain, atypical     ICD-10-CM: R07.89  ICD-9-CM: 786.59         Seen and examined agree with narrative is discussed with nurse practitioner to face-to-face encounter with scribed findings below, exam medical decision making all discussed after face-to-face encounter and consultation with the patient with additional counseling on clinical course, PCI, cardioversion, time for follow-up, medications, when to present back to clinic or ER for concerning signs or symptoms, all questions answered as proposed by patient today and he is ready and stable for discharge      CAD s/p PCI to LAD 2023- on ASA / Plavix  Atrial fibrillation- plan for CV today, outpt ablation, a/c with Apixiban, chads vasc at lest 3  HFrEF, chronic systolic and diastolic HF, LVEF 15-20%, grade 2 DD- entresto started, valsartan stopped, added low dose Toprol XL   Mild pulmonary HTN  Left bundle branch block     Hospital Course:     Patient was admitted, Underwent [facility]  s/p PCI to LAD 2023- on ASA / Plavix- Atrial fibrillation- plan for  CV today, outpt ablation, a/c with Apixiban, chads vasc at lest 3- he will be on triple therapy x 1 mo then stop ASA. He will discharge on taper amiodarone dose. ECG in 1 week to monitor QT interval, BMP in 1 week.  HFrEF, chronic systolic and diastolic HF, LVEF 15-20%, grade 2 DD- entresto started, valsartan stopped. Evaluation will be made in 1 mo for repeat ECHO post revascularization, should LVEF be < 35% he will need ICD / Biv.   He is stable to discharge with outpt f/u.     Procedures Performed  Procedure(s):  Left Heart Cath       Consults       No orders found from 11/2/2023 to 12/2/2023.            Pertinent Test Results:   Results from last 7 days   Lab Units 12/02/23  0137 12/01/23  0918   SODIUM mmol/L 140 141   POTASSIUM mmol/L 4.5 5.1   CHLORIDE mmol/L 105 105   CO2 mmol/L 25.0 29.0   BUN mg/dL 17 21   CREATININE mg/dL 1.17 1.18   CALCIUM mg/dL 9.0 9.8   BILIRUBIN mg/dL  --  1.2   ALK PHOS U/L  --  63   ALT (SGPT) U/L  --  28   AST (SGOT) U/L  --  16   GLUCOSE mg/dL 110* 112*         @LABRCNT(bnp)@  Results from last 7 days   Lab Units 12/02/23  0137   WBC 10*3/mm3 6.30   HEMOGLOBIN g/dL 13.6   HEMATOCRIT % 40.7   PLATELETS 10*3/mm3 168     Results from last 7 days   Lab Units 12/01/23  0918   INR  1.07         Results from last 7 days   Lab Units 12/02/23  0137   CHOLESTEROL mg/dL 93   TRIGLYCERIDES mg/dL 97   HDL CHOL mg/dL 30*   LDL CHOL mg/dL 44       ECHOCARDIOGRAM:    Results for orders placed during the hospital encounter of 12/04/23    Adult Transesophageal Echo (BLANCHE) W/ Cont if Necessary Per Protocol    Interpretation Summary    There is (grade 1) plaque in the descending aorta present.    Severely reduced left ventricular systolic function  Dilated right ventricle with reduced function  Dilated left atrium  No left atrial appendage thrombus noted  Agitated saline/bubble study did not reveal interatrial shunt/PFO  Mild to moderate central mitral regurgitation.  Mild atheroma in descending  aorta.        Condition on Discharge: stable        Discharge Medications     Discharge Medications        New Medications        Instructions Start Date   amiodarone 400 MG tablet  Commonly known as: PACERONE   400 mg, Oral, Every 12 Hours Scheduled      amiodarone 200 MG tablet  Commonly known as: PACERONE   200 mg, Oral, 2 Times Daily   Start Date: December 10, 2023     apixaban 5 MG tablet tablet  Commonly known as: ELIQUIS   5 mg, Oral, Every 12 Hours Scheduled      clopidogrel 75 MG tablet  Commonly known as: PLAVIX   75 mg, Oral, Daily      metoprolol succinate XL 25 MG 24 hr tablet  Commonly known as: TOPROL-XL   12.5 mg, Oral, Daily      nitroglycerin 0.4 MG SL tablet  Commonly known as: NITROSTAT   0.4 mg, Sublingual, Every 5 Minutes PRN, Take no more than 3 doses in 15 minutes.      sacubitril-valsartan 24-26 MG tablet  Commonly known as: ENTRESTO   1 tablet, Oral, Every 12 Hours Scheduled             Continue These Medications        Instructions Start Date   aspirin 81 MG chewable tablet   81 mg, Oral, Daily      finasteride 5 MG tablet  Commonly known as: PROSCAR   5 mg, Oral, Daily      rosuvastatin 20 MG tablet  Commonly known as: CRESTOR   TAKE 1 TABLET BY MOUTH EVERY DAY      vitamin D3 125 MCG (5000 UT) capsule capsule   5,000 Units, Oral, Daily             Stop These Medications      dilTIAZem 360 MG 24 hr capsule  Commonly known as: TIAZAC     meloxicam 15 MG tablet  Commonly known as: MOBIC     rivaroxaban 20 MG tablet  Commonly known as: XARELTO              Discharge Diet: HHD, low sodium diet, fluid restriction    Activity at Discharge: Rest and relax today  no lifting over 10 lbs x 1 week  no driving for 24 hrs  no tub baths or hot tubs for 1 week  no stairs for 3 days  monitor right groin for s/s of bleeding    Discharge disposition: home    Follow-up Appointments  Future Appointments   Date Time Provider Department Center   3/6/2024  1:30 PM Truong Ortiz MD [facility]  NA     Additional Instructions for the Follow-ups that You Need to Schedule       Basic Metabolic Panel    Dec 09, 2023 (Approximate)      Release to patient: Routine Release                Test Results Pending at Discharge       JAMEE Trinidad, [facility]    12/05/23  14:57 EST    Time: > 30 minutes spent on discharge explaining discharge medications, instructions, activity / diet instructions / restrictions, counseling on disease processes, and follow up care / appointments.

## 2023-12-05 NOTE — TELEPHONE ENCOUNTER
Caller: Dillon Aaron    Relationship: Self    Best call back number: [phone]    PATIENT WANTED TO KNOW IF IT WAS OKAY THAT HE SKIPPED THESE MEDICATIONS UNTIL TOMORROW:     AMIODARONE, METOPROLOL, CLOPIDOGREL, ENTRESTO    THE PHARMACY WILL NOT HAVE THEM READY UNTIL 2 PM 12.6.23

## 2023-12-05 NOTE — TELEPHONE ENCOUNTER
Pt notified to call around to other pharmacies and have his pharmacy transfer to another pharmacy that has them so he can get them today.

## 2023-12-05 NOTE — CASE MANAGEMENT/SOCIAL WORK
Case Management Discharge Note             Selected Continued Care - Discharged on 12/4/2023 Admission date: 12/1/2023 - Discharge disposition: Home or Self Care            Transportation Services  Private: Car    Final Discharge Disposition Code: 01 - home or self-care

## 2023-12-05 NOTE — TELEPHONE ENCOUNTER
Caller: Dillon Aaron     Relationship: Self     Best call back number: [phone]    PT RETURNED PHONE CALL TO DISCUSS

## 2023-12-06 ENCOUNTER — HOSPITAL ENCOUNTER (OUTPATIENT)
Facility: HOSPITAL | Age: 68
Setting detail: OBSERVATION
Discharge: HOME | End: 2023-12-07
Attending: EMERGENCY MEDICINE | Admitting: INTERNAL MEDICINE
Payer: MEDICARE

## 2023-12-06 ENCOUNTER — APPOINTMENT (OUTPATIENT)
Dept: GENERAL RADIOLOGY | Facility: HOSPITAL | Age: 68
End: 2023-12-06
Payer: MEDICARE

## 2023-12-06 ENCOUNTER — NURSE TRIAGE (OUTPATIENT)
Dept: CALL CENTER | Facility: HOSPITAL | Age: 68
End: 2023-12-06
Payer: MEDICARE

## 2023-12-06 DIAGNOSIS — R07.9 CHEST PAIN, UNSPECIFIED TYPE: Primary | ICD-10-CM

## 2023-12-06 LAB
ANION GAP SERPL CALCULATED.3IONS-SCNC: 9 MMOL/L (ref 5–15)
APTT PPP: 26.4 SECONDS (ref 61–76.5)
BASOPHILS # BLD AUTO: 0 10*3/MM3 (ref 0–0.2)
BASOPHILS NFR BLD AUTO: 0.5 % (ref 0–1.5)
BUN SERPL-MCNC: 25 MG/DL (ref 8–23)
BUN/CREAT SERPL: 22.5 (ref 7–25)
CALCIUM SPEC-SCNC: 9.5 MG/DL (ref 8.6–10.5)
CHLORIDE SERPL-SCNC: 104 MMOL/L (ref 98–107)
CO2 SERPL-SCNC: 27 MMOL/L (ref 22–29)
CREAT SERPL-MCNC: 1.11 MG/DL (ref 0.76–1.27)
DEPRECATED RDW RBC AUTO: 47.3 FL (ref 37–54)
EGFRCR SERPLBLD CKD-EPI 2021: 72.3 ML/MIN/1.73
EOSINOPHIL # BLD AUTO: 0.1 10*3/MM3 (ref 0–0.4)
EOSINOPHIL NFR BLD AUTO: 2.6 % (ref 0.3–6.2)
ERYTHROCYTE [DISTWIDTH] IN BLOOD BY AUTOMATED COUNT: 14.3 % (ref 12.3–15.4)
GEN 5 2HR TROPONIN T REFLEX: 119 NG/L
GLUCOSE SERPL-MCNC: 106 MG/DL (ref 65–99)
HCT VFR BLD AUTO: 42.9 % (ref 37.5–51)
HGB BLD-MCNC: 14.4 G/DL (ref 13–17.7)
HOLD SPECIMEN: NORMAL
INR PPP: 1 (ref 0.93–1.1)
LYMPHOCYTES # BLD AUTO: 2 10*3/MM3 (ref 0.7–3.1)
LYMPHOCYTES NFR BLD AUTO: 34 % (ref 19.6–45.3)
MCH RBC QN AUTO: 32 PG (ref 26.6–33)
MCHC RBC AUTO-ENTMCNC: 33.5 G/DL (ref 31.5–35.7)
MCV RBC AUTO: 95.6 FL (ref 79–97)
MONOCYTES # BLD AUTO: 0.6 10*3/MM3 (ref 0.1–0.9)
MONOCYTES NFR BLD AUTO: 10.3 % (ref 5–12)
NEUTROPHILS NFR BLD AUTO: 3 10*3/MM3 (ref 1.7–7)
NEUTROPHILS NFR BLD AUTO: 52.6 % (ref 42.7–76)
NRBC BLD AUTO-RTO: 0 /100 WBC (ref 0–0.2)
PLATELET # BLD AUTO: 198 10*3/MM3 (ref 140–450)
PMV BLD AUTO: 7.8 FL (ref 6–12)
POTASSIUM SERPL-SCNC: 4.5 MMOL/L (ref 3.5–5.2)
PROTHROMBIN TIME: 10.9 SECONDS (ref 9.6–11.7)
RBC # BLD AUTO: 4.49 10*6/MM3 (ref 4.14–5.8)
SODIUM SERPL-SCNC: 140 MMOL/L (ref 136–145)
TROPONIN T DELTA: -34 NG/L
TROPONIN T SERPL HS-MCNC: 153 NG/L
WBC NRBC COR # BLD AUTO: 5.7 10*3/MM3 (ref 3.4–10.8)

## 2023-12-06 PROCEDURE — 85610 PROTHROMBIN TIME: CPT | Performed by: EMERGENCY MEDICINE

## 2023-12-06 PROCEDURE — 36415 COLL VENOUS BLD VENIPUNCTURE: CPT

## 2023-12-06 PROCEDURE — 99284 EMERGENCY DEPT VISIT MOD MDM: CPT

## 2023-12-06 PROCEDURE — 85730 THROMBOPLASTIN TIME PARTIAL: CPT | Performed by: EMERGENCY MEDICINE

## 2023-12-06 PROCEDURE — 93005 ELECTROCARDIOGRAM TRACING: CPT | Performed by: EMERGENCY MEDICINE

## 2023-12-06 PROCEDURE — 71045 X-RAY EXAM CHEST 1 VIEW: CPT

## 2023-12-06 PROCEDURE — 80048 BASIC METABOLIC PNL TOTAL CA: CPT | Performed by: EMERGENCY MEDICINE

## 2023-12-06 PROCEDURE — 84484 ASSAY OF TROPONIN QUANT: CPT | Performed by: EMERGENCY MEDICINE

## 2023-12-06 PROCEDURE — G0378 HOSPITAL OBSERVATION PER HR: HCPCS

## 2023-12-06 PROCEDURE — 85025 COMPLETE CBC W/AUTO DIFF WBC: CPT | Performed by: EMERGENCY MEDICINE

## 2023-12-06 RX ORDER — SODIUM CHLORIDE 0.9 % (FLUSH) 0.9 %
10 SYRINGE (ML) INJECTION AS NEEDED
Status: DISCONTINUED | OUTPATIENT
Start: 2023-12-06 | End: 2023-12-07 | Stop reason: HOSPADM

## 2023-12-06 RX ORDER — BISACODYL 10 MG
10 SUPPOSITORY, RECTAL RECTAL DAILY PRN
Status: DISCONTINUED | OUTPATIENT
Start: 2023-12-06 | End: 2023-12-07 | Stop reason: HOSPADM

## 2023-12-06 RX ORDER — METOPROLOL SUCCINATE 25 MG/1
12.5 TABLET, EXTENDED RELEASE ORAL DAILY
Status: DISCONTINUED | OUTPATIENT
Start: 2023-12-07 | End: 2023-12-07 | Stop reason: HOSPADM

## 2023-12-06 RX ORDER — AMIODARONE HYDROCHLORIDE 200 MG/1
400 TABLET ORAL EVERY 12 HOURS SCHEDULED
Status: DISCONTINUED | OUTPATIENT
Start: 2023-12-06 | End: 2023-12-07 | Stop reason: HOSPADM

## 2023-12-06 RX ORDER — ONDANSETRON 4 MG/1
4 TABLET, FILM COATED ORAL EVERY 6 HOURS PRN
Status: DISCONTINUED | OUTPATIENT
Start: 2023-12-06 | End: 2023-12-07 | Stop reason: HOSPADM

## 2023-12-06 RX ORDER — PANTOPRAZOLE SODIUM 40 MG/10ML
40 INJECTION, POWDER, LYOPHILIZED, FOR SOLUTION INTRAVENOUS
Status: DISCONTINUED | OUTPATIENT
Start: 2023-12-07 | End: 2023-12-07 | Stop reason: HOSPADM

## 2023-12-06 RX ORDER — AMIODARONE HYDROCHLORIDE 200 MG/1
200 TABLET ORAL 2 TIMES DAILY
Status: DISCONTINUED | OUTPATIENT
Start: 2023-12-10 | End: 2023-12-07 | Stop reason: HOSPADM

## 2023-12-06 RX ORDER — SODIUM CHLORIDE 0.9 % (FLUSH) 0.9 %
10 SYRINGE (ML) INJECTION EVERY 12 HOURS SCHEDULED
Status: DISCONTINUED | OUTPATIENT
Start: 2023-12-06 | End: 2023-12-07 | Stop reason: HOSPADM

## 2023-12-06 RX ORDER — AMOXICILLIN 250 MG
2 CAPSULE ORAL 2 TIMES DAILY
Status: DISCONTINUED | OUTPATIENT
Start: 2023-12-06 | End: 2023-12-07 | Stop reason: HOSPADM

## 2023-12-06 RX ORDER — SODIUM CHLORIDE 9 MG/ML
40 INJECTION, SOLUTION INTRAVENOUS AS NEEDED
Status: DISCONTINUED | OUTPATIENT
Start: 2023-12-06 | End: 2023-12-07 | Stop reason: HOSPADM

## 2023-12-06 RX ORDER — ROSUVASTATIN CALCIUM 10 MG/1
20 TABLET, COATED ORAL DAILY
Status: DISCONTINUED | OUTPATIENT
Start: 2023-12-07 | End: 2023-12-07 | Stop reason: HOSPADM

## 2023-12-06 RX ORDER — CLOPIDOGREL BISULFATE 75 MG/1
75 TABLET ORAL DAILY
Status: DISCONTINUED | OUTPATIENT
Start: 2023-12-07 | End: 2023-12-07 | Stop reason: HOSPADM

## 2023-12-06 RX ORDER — ASPIRIN 81 MG/1
81 TABLET, CHEWABLE ORAL DAILY
Status: DISCONTINUED | OUTPATIENT
Start: 2023-12-07 | End: 2023-12-07 | Stop reason: HOSPADM

## 2023-12-06 RX ORDER — BISACODYL 5 MG/1
5 TABLET, DELAYED RELEASE ORAL DAILY PRN
Status: DISCONTINUED | OUTPATIENT
Start: 2023-12-06 | End: 2023-12-07 | Stop reason: HOSPADM

## 2023-12-06 RX ORDER — POLYETHYLENE GLYCOL 3350 17 G/17G
17 POWDER, FOR SOLUTION ORAL DAILY PRN
Status: DISCONTINUED | OUTPATIENT
Start: 2023-12-06 | End: 2023-12-07 | Stop reason: HOSPADM

## 2023-12-06 RX ORDER — FINASTERIDE 5 MG/1
5 TABLET, FILM COATED ORAL DAILY
Status: DISCONTINUED | OUTPATIENT
Start: 2023-12-07 | End: 2023-12-07 | Stop reason: HOSPADM

## 2023-12-06 RX ORDER — ONDANSETRON 2 MG/ML
4 INJECTION INTRAMUSCULAR; INTRAVENOUS EVERY 6 HOURS PRN
Status: DISCONTINUED | OUTPATIENT
Start: 2023-12-06 | End: 2023-12-07 | Stop reason: HOSPADM

## 2023-12-06 RX ADMIN — Medication 10 ML: at 23:35

## 2023-12-06 RX ADMIN — APIXABAN 5 MG: 5 TABLET, FILM COATED ORAL at 23:35

## 2023-12-06 RX ADMIN — SACUBITRIL AND VALSARTAN 1 TABLET: 24; 26 TABLET, FILM COATED ORAL at 23:35

## 2023-12-06 NOTE — Clinical Note
Level of Care: Med/Surg [1]   Diagnosis: Chest pain [[id number]]   Admitting Physician: MIKE MONTIEL [[id number]]   Attending Physician: MIKE MONTIEL [[id number]]

## 2023-12-06 NOTE — ED PROVIDER NOTES
Subjective   History of Present Illness  Chief complaint: Chest pain    67-year-old male presents with chest pain.  Patient has a history of coronary artery disease.  He just had a stent placed a few days ago.  He states he felt fine until this morning when he developed left-sided chest pain.  Pain has been intermittent throughout the day.  Pain is in the left chest and goes into the left shoulder area.  He denies any shortness of breath, diaphoresis, dizziness, palpitations, nausea.  He took 1 sublingual nitro at home and had no improvement.  In route to the hospital he took a second sublingual nitro and he states he is now chest pain-free.    History provided by:  Patient      Review of Systems   Constitutional:  Negative for fever.   HENT:  Negative for congestion.    Respiratory:  Negative for cough and shortness of breath.    Cardiovascular:  Positive for chest pain.   Gastrointestinal:  Negative for abdominal pain and vomiting.   Musculoskeletal:  Negative for back pain.   Skin:  Negative for rash.   Neurological:  Negative for headaches.   Psychiatric/Behavioral:  Negative for confusion.        Past Medical History:   Diagnosis Date    Atrial fibrillation     Hyperlipidemia     Hypertension        No Known Allergies    Past Surgical History:   Procedure Laterality Date    CARDIAC CATHETERIZATION N/A 04/22/2022    Procedure: Left Heart Cath;  Surgeon: Truong Ortiz MD;  Location: [facility] CATH INVASIVE LOCATION;  Service: Cardiology;  Laterality: N/A;    CARDIAC CATHETERIZATION N/A 12/1/2023    Procedure: Left Heart Cath;  Surgeon: Truong Ortiz MD;  Location: [facility] CATH INVASIVE LOCATION;  Service: Cardiology;  Laterality: N/A;    CORONARY ANGIOPLASTY WITH STENT PLACEMENT  12/01/2023    Left heart cath with angioplasty and kissing stents to LAD and diagonal by Dr Ortiz       Family History   Problem Relation Age of Onset    Heart disease Mother     Heart disease Father        Social  "History     Socioeconomic History    Marital status:    Tobacco Use    Smoking status: Never    Smokeless tobacco: Never   Vaping Use    Vaping Use: Never used   Substance and Sexual Activity    Alcohol use: Yes     Alcohol/week: 6.0 standard drinks of alcohol     Types: 6 Cans of beer per week     Comment: a year     Drug use: Never    Sexual activity: Defer       /78   Pulse 61   Temp 98.5 °F (36.9 °C)   Resp 16   Ht 182.9 cm (72\")   Wt 112 kg (246 lb 14.6 oz)   SpO2 97%   BMI 33.49 kg/m²       Objective   Physical Exam  Vitals and nursing note reviewed.   Constitutional:       Appearance: He is well-developed.   HENT:      Head: Normocephalic and atraumatic.   Cardiovascular:      Rate and Rhythm: Normal rate and regular rhythm.      Heart sounds: Normal heart sounds.   Pulmonary:      Effort: Pulmonary effort is normal. No respiratory distress.      Breath sounds: Normal breath sounds.   Abdominal:      General: Bowel sounds are normal.      Palpations: Abdomen is soft.      Tenderness: There is no abdominal tenderness.   Musculoskeletal:      Right lower leg: No tenderness. No edema.      Left lower leg: No tenderness. No edema.   Skin:     General: Skin is warm and dry.   Neurological:      Mental Status: He is alert and oriented to person, place, and time.         Procedures           ED Course      My interpretation of EKG shows sinus rhythm, rate of 68, left bundle branch block which is old          HEART Score: 8                  Results for orders placed or performed during the hospital encounter of 12/06/23   Basic Metabolic Panel    Specimen: Blood   Result Value Ref Range    Glucose 106 (H) 65 - 99 mg/dL    BUN 25 (H) 8 - 23 mg/dL    Creatinine 1.11 0.76 - 1.27 mg/dL    Sodium 140 136 - 145 mmol/L    Potassium 4.5 3.5 - 5.2 mmol/L    Chloride 104 98 - 107 mmol/L    CO2 27.0 22.0 - 29.0 mmol/L    Calcium 9.5 8.6 - 10.5 mg/dL    BUN/Creatinine Ratio 22.5 7.0 - 25.0    Anion Gap 9.0 " 5.0 - 15.0 mmol/L    eGFR 72.3 >60.0 mL/min/1.73   Protime-INR    Specimen: Blood   Result Value Ref Range    Protime 10.9 9.6 - 11.7 Seconds    INR 1.00 0.93 - 1.10   aPTT    Specimen: Blood   Result Value Ref Range    PTT 26.4 (L) 61.0 - 76.5 seconds   Single High Sensitivity Troponin T    Specimen: Blood   Result Value Ref Range    HS Troponin T 153 (C) <22 ng/L   CBC Auto Differential    Specimen: Blood   Result Value Ref Range    WBC 5.70 3.40 - 10.80 10*3/mm3    RBC 4.49 4.14 - 5.80 10*6/mm3    Hemoglobin 14.4 13.0 - 17.7 g/dL    Hematocrit 42.9 37.5 - 51.0 %    MCV 95.6 79.0 - 97.0 fL    MCH 32.0 26.6 - 33.0 pg    MCHC 33.5 31.5 - 35.7 g/dL    RDW 14.3 12.3 - 15.4 %    RDW-SD 47.3 37.0 - 54.0 fl    MPV 7.8 6.0 - 12.0 fL    Platelets 198 140 - 450 10*3/mm3    Neutrophil % 52.6 42.7 - 76.0 %    Lymphocyte % 34.0 19.6 - 45.3 %    Monocyte % 10.3 5.0 - 12.0 %    Eosinophil % 2.6 0.3 - 6.2 %    Basophil % 0.5 0.0 - 1.5 %    Neutrophils, Absolute 3.00 1.70 - 7.00 10*3/mm3    Lymphocytes, Absolute 2.00 0.70 - 3.10 10*3/mm3    Monocytes, Absolute 0.60 0.10 - 0.90 10*3/mm3    Eosinophils, Absolute 0.10 0.00 - 0.40 10*3/mm3    Basophils, Absolute 0.00 0.00 - 0.20 10*3/mm3    nRBC 0.0 0.0 - 0.2 /100 WBC   High Sensitivity Troponin T 2Hr    Specimen: Blood   Result Value Ref Range    HS Troponin T 119 (C) <22 ng/L    Troponin T Delta -34 (L) >=-4 - <+4 ng/L   ECG 12 Lead Chest Pain   Result Value Ref Range    QT Interval 453 ms    QTC Interval 482 ms   Gold Top - SST   Result Value Ref Range    Extra Tube Hold for add-ons.      XR Chest 1 View    Result Date: 12/6/2023  Impression: Stable cardiomegaly. No active cardiopulmonary disease. Electronically Signed: Kishor Segovia DO  12/6/2023 5:47 PM EST  Workstation ID: [id number]               Medical Decision Making  Amount and/or Complexity of Data Reviewed  Labs: ordered.  Radiology: ordered.  ECG/medicine tests: ordered.    Risk  Prescription drug management.      Patient  had the above evaluation.  Results were discussed with the patient.  My interpretation of chest x-ray shows no infiltrate or effusion.  EKG shows no acute ischemia.  Initial troponin was 153 and repeat troponin trended down to 119.  Troponin may be elevated from his recent cardiac work.  White blood cell count is normal.  BMP is unremarkable.  I discussed with the nurse practitioner on-call for the primary doctor and the patient will be admitted for cardiology consult.      Final diagnoses:   Chest pain, unspecified type       ED Disposition  ED Disposition       ED Disposition   Decision to Admit    Condition   --    Comment   Level of Care: Telemetry [5]   Admitting Physician: MIKE MONTIEL [[id number]]   Attending Physician: MIKE MONTIEL [[id number]]                 No follow-up provider specified.       Medication List      No changes were made to your prescriptions during this visit.            Shahriar Mills MD  12/06/23 2003

## 2023-12-06 NOTE — TELEPHONE ENCOUNTER
"Has had chest pain intermittent all day since 0700 Recent hospital stay had a heart cath with 2 stents placed.    Has NTG but has not taken any.  Discussed procedure for taking NTG for chest pain. If chest pain unrelieved after 3 NTG 5 minutes apart to go to the ED.    Caller describes pain as achy feeling, worse with exertion 5/10 does not increase with deep breaths.  Cardiology  [place]  Care advice given per guideline.        Reason for Disposition   [1] Chest pain lasts > 5 minutes AND [2] occurred in past 3 days (72 hours) (Exception: Feels exactly the same as previously diagnosed heartburn and has accompanying sour taste in mouth.)    Additional Information   Negative: SEVERE difficulty breathing (e.g., struggling for each breath, speaks in single words)   Negative: Difficult to awaken or acting confused (e.g., disoriented, slurred speech)   Negative: Shock suspected (e.g., cold/pale/clammy skin, too weak to stand, low BP, rapid pulse)   Negative: Passed out (i.e., lost consciousness, collapsed and was not responding)   Negative: [1] Chest pain lasts > 5 minutes AND [2] age > 44   Negative: [1] Chest pain lasts > 5 minutes AND [2] age > 30 AND [3] one or more cardiac risk factors (e.g., diabetes, high blood pressure, high cholesterol, smoker, or strong family history of heart disease)   Negative: [1] Chest pain lasts > 5 minutes AND [2] history of heart disease (i.e., angina, heart attack, heart failure, bypass surgery, takes nitroglycerin)   Negative: [1] Chest pain lasts > 5 minutes AND [2] described as crushing, pressure-like, or heavy   Negative: Heart beating < 50 beats per minute OR > 140 beats per minute   Negative: Visible sweat on face or sweat dripping down face   Negative: Sounds like a life-threatening emergency to the triager   Negative: Followed a chest injury   Negative: SEVERE chest pain   Negative: [1] Chest pain (or \"angina\") comes and goes AND [2] is happening more often (increasing in " "frequency) or getting worse (increasing in severity)  (Exception: Chest pains that last only a few seconds.)   Negative: Pain also in shoulder(s) or arm(s) or jaw  (Exception: Pain is clearly made worse by movement.)   Negative: Difficulty breathing   Negative: Dizziness or lightheadedness   Negative: Coughing up blood   Negative: Cocaine use within last 3 days   Negative: Major surgery in past month   Negative: Hip or leg fracture (broken bone) in past month (or had cast on leg or ankle in past month)   Negative: Illness requiring prolonged bedrest in past month (e.g., immobilization, long hospital stay)   Negative: Long-distance travel in past month (e.g., car, bus, train, plane; with trip lasting 6 or more hours)   Negative: History of prior \"blood clot\" in leg or lungs (i.e., deep vein thrombosis, pulmonary embolism)   Negative: History of inherited increased risk of blood clots (e.g., Factor 5 Leiden, Anti-thrombin 3, Protein C or Protein S deficiency, Prothrombin mutation)   Negative: Cancer treatment in past six months (or has cancer now)   Negative: Taking a deep breath makes pain worse    Answer Assessment - Initial Assessment Questions  1. LOCATION: \"Where does it hurt?\"        Left sided chest pain  2. RADIATION: \"Does the pain go anywhere else?\" (e.g., into neck, jaw, arms, back)  No radiation  3. ONSET: \"When did the chest pain begin?\" (Minutes, hours or days)       This am about 0700  4. PATTERN: \"Does the pain come and go, or has it been constant since it started?\"  \"Does it get worse with exertion?\"       Intermittent worse with exertion  5. DURATION: \"How long does it last\" (e.g., seconds, minutes, hours)    All day  6. SEVERITY: \"How bad is the pain?\"  (e.g., Scale 1-10; mild, moderate, or severe)     - MILD (1-3): doesn't interfere with normal activities      - MODERATE (4-7): interferes with normal activities or awakens from sleep     - SEVERE (8-10): excruciating pain, unable to do any normal " "activities     5/10  7. CARDIAC RISK FACTORS: \"Do you have any history of heart problems or risk factors for heart disease?\" (e.g., angina, prior heart attack; diabetes, high blood pressure, high cholesterol, smoker, or strong family history of heart disease)     Had a heart cath on Friday 2 stents placed     Afib had a cardioversion  8. PULMONARY RISK FACTORS: \"Do you have any history of lung disease?\"  (e.g., blood clots in lung, asthma, emphysema, birth control pills)     denies  9. CAUSE: \"What do you think is causing the chest pain?\"      Not sure  10. OTHER SYMPTOMS: \"Do you have any other symptoms?\" (e.g., dizziness, nausea, vomiting, sweating, fever, difficulty breathing, cough)        denies  11. PREGNANCY: \"Is there any chance you are pregnant?\" \"When was    Protocols used: Chest Pain-ADULT-AH    "

## 2023-12-07 ENCOUNTER — TELEPHONE (OUTPATIENT)
Dept: CARDIAC REHAB | Facility: HOSPITAL | Age: 68
End: 2023-12-07
Payer: MEDICARE

## 2023-12-07 VITALS
TEMPERATURE: 98.4 F | HEART RATE: 57 BPM | RESPIRATION RATE: 17 BRPM | OXYGEN SATURATION: 97 % | HEIGHT: 72 IN | SYSTOLIC BLOOD PRESSURE: 120 MMHG | DIASTOLIC BLOOD PRESSURE: 80 MMHG | BODY MASS INDEX: 33.44 KG/M2 | WEIGHT: 246.91 LBS

## 2023-12-07 PROBLEM — L25.9 CONTACT DERMATITIS: Status: ACTIVE | Noted: 2023-12-07

## 2023-12-07 LAB
ANION GAP SERPL CALCULATED.3IONS-SCNC: 11 MMOL/L (ref 5–15)
BASOPHILS # BLD AUTO: 0 10*3/MM3 (ref 0–0.2)
BASOPHILS NFR BLD AUTO: 0.7 % (ref 0–1.5)
BUN SERPL-MCNC: 17 MG/DL (ref 8–23)
BUN/CREAT SERPL: 16.7 (ref 7–25)
CALCIUM SPEC-SCNC: 9.3 MG/DL (ref 8.6–10.5)
CHLORIDE SERPL-SCNC: 101 MMOL/L (ref 98–107)
CO2 SERPL-SCNC: 25 MMOL/L (ref 22–29)
CREAT SERPL-MCNC: 1.02 MG/DL (ref 0.76–1.27)
DEPRECATED RDW RBC AUTO: 48.1 FL (ref 37–54)
EGFRCR SERPLBLD CKD-EPI 2021: 80.1 ML/MIN/1.73
EOSINOPHIL # BLD AUTO: 0.1 10*3/MM3 (ref 0–0.4)
EOSINOPHIL NFR BLD AUTO: 2.2 % (ref 0.3–6.2)
ERYTHROCYTE [DISTWIDTH] IN BLOOD BY AUTOMATED COUNT: 14.2 % (ref 12.3–15.4)
GLUCOSE SERPL-MCNC: 108 MG/DL (ref 65–99)
HCT VFR BLD AUTO: 45.2 % (ref 37.5–51)
HGB BLD-MCNC: 15.1 G/DL (ref 13–17.7)
LYMPHOCYTES # BLD AUTO: 1.6 10*3/MM3 (ref 0.7–3.1)
LYMPHOCYTES NFR BLD AUTO: 28.6 % (ref 19.6–45.3)
MCH RBC QN AUTO: 31.4 PG (ref 26.6–33)
MCHC RBC AUTO-ENTMCNC: 33.3 G/DL (ref 31.5–35.7)
MCV RBC AUTO: 94.1 FL (ref 79–97)
MONOCYTES # BLD AUTO: 0.5 10*3/MM3 (ref 0.1–0.9)
MONOCYTES NFR BLD AUTO: 8.4 % (ref 5–12)
NEUTROPHILS NFR BLD AUTO: 3.3 10*3/MM3 (ref 1.7–7)
NEUTROPHILS NFR BLD AUTO: 60.1 % (ref 42.7–76)
NRBC BLD AUTO-RTO: 0.1 /100 WBC (ref 0–0.2)
PLATELET # BLD AUTO: 171 10*3/MM3 (ref 140–450)
PMV BLD AUTO: 8.3 FL (ref 6–12)
POTASSIUM SERPL-SCNC: 4.6 MMOL/L (ref 3.5–5.2)
QT INTERVAL: 413 MS
QT INTERVAL: 421 MS
QT INTERVAL: 430 MS
QT INTERVAL: 453 MS
QT INTERVAL: 459 MS
QTC INTERVAL: 482 MS
QTC INTERVAL: 493 MS
QTC INTERVAL: 503 MS
QTC INTERVAL: 519 MS
QTC INTERVAL: 538 MS
RBC # BLD AUTO: 4.81 10*6/MM3 (ref 4.14–5.8)
SODIUM SERPL-SCNC: 137 MMOL/L (ref 136–145)
WBC NRBC COR # BLD AUTO: 5.4 10*3/MM3 (ref 3.4–10.8)

## 2023-12-07 PROCEDURE — 36415 COLL VENOUS BLD VENIPUNCTURE: CPT

## 2023-12-07 PROCEDURE — 99214 OFFICE O/P EST MOD 30 MIN: CPT | Performed by: INTERNAL MEDICINE

## 2023-12-07 PROCEDURE — 96374 THER/PROPH/DIAG INJ IV PUSH: CPT

## 2023-12-07 PROCEDURE — G0378 HOSPITAL OBSERVATION PER HR: HCPCS

## 2023-12-07 PROCEDURE — 80048 BASIC METABOLIC PNL TOTAL CA: CPT

## 2023-12-07 PROCEDURE — 85025 COMPLETE CBC W/AUTO DIFF WBC: CPT

## 2023-12-07 RX ORDER — TRIAMCINOLONE ACETONIDE 1 MG/G
1 CREAM TOPICAL EVERY 12 HOURS SCHEDULED
Status: DISCONTINUED | OUTPATIENT
Start: 2023-12-07 | End: 2023-12-07 | Stop reason: HOSPADM

## 2023-12-07 RX ORDER — ROSUVASTATIN CALCIUM 20 MG/1
20 TABLET, COATED ORAL NIGHTLY
COMMUNITY

## 2023-12-07 RX ORDER — TRIAMCINOLONE ACETONIDE 1 MG/G
1 CREAM TOPICAL EVERY 12 HOURS SCHEDULED
Qty: 15 G | Refills: 0 | Status: SHIPPED | OUTPATIENT
Start: 2023-12-07

## 2023-12-07 RX ADMIN — FINASTERIDE 5 MG: 5 TABLET, FILM COATED ORAL at 08:54

## 2023-12-07 RX ADMIN — CLOPIDOGREL BISULFATE 75 MG: 75 TABLET ORAL at 08:54

## 2023-12-07 RX ADMIN — ASPIRIN 81 MG CHEWABLE TABLET 81 MG: 81 TABLET CHEWABLE at 08:54

## 2023-12-07 RX ADMIN — PANTOPRAZOLE SODIUM 40 MG: 40 INJECTION, POWDER, LYOPHILIZED, FOR SOLUTION INTRAVENOUS at 06:07

## 2023-12-07 RX ADMIN — Medication 10 ML: at 08:54

## 2023-12-07 RX ADMIN — SACUBITRIL AND VALSARTAN 1 TABLET: 24; 26 TABLET, FILM COATED ORAL at 08:54

## 2023-12-07 RX ADMIN — APIXABAN 5 MG: 5 TABLET, FILM COATED ORAL at 08:54

## 2023-12-07 RX ADMIN — ROSUVASTATIN 20 MG: 10 TABLET, FILM COATED ORAL at 08:54

## 2023-12-07 RX ADMIN — METOPROLOL SUCCINATE 12.5 MG: 25 TABLET, EXTENDED RELEASE ORAL at 08:54

## 2023-12-07 NOTE — CONSULTS
Referring Provider: Vi Johnston MD    Reason for Consultation:      Chest pain  CAD  Severe LV dysfunction  Paroxysmal atrial fibrillation      Patient Care Team:  Tre Burkett MD as PCP - General      SUBJECTIVE     Chief Complaint: Complains of chest pain    Agree with narrative is discussed with nurse practitioner after face-to-face encounter scruff findings below    History of present illness:  Dillon Aaron is a 67 y.o. male with a history of coronary artery disease and severe LV dysfunction who presented to [facility] with complaint of chest pain.    Patient presents to [facility] on December 6, 2023 with complaints of chest pain.  He reports that his center of his left upper chest.  It has been constant throughout the day.  It has not waxed or waned within activity.  It is not associated with shortness of breath, diaphoresis, near syncope or nausea or vomiting.    [place] [facility] [place] [address] [place] just the day prior to readmission on December 5, 2023.    During that hospitalization the patient underwent cardiac catheterization and had PCI to the LAD.  He was treated with dual antiplatelet therapy with aspirin and Plavix.  Patient initially presented in atrial fibrillation and underwent cardioversion with plans for outpatient ablation.  He has been anticoagulated with Eliquis along with DAPT with very low likelihood of stent thrombosis.  He had great results with resolution of LAD and diagonal stenosis to 0% residual with HONORIO-3 flow to both vessels    Ejection fraction was 15 to 20% there is grade 2 diastolic dysfunction.    Patient was started on guideline directed medical therapy including Toprol-XL, Entresto, dual antiplatelet therapy, amiodarone, statin.    The patient reports the pain he is having is different than previous angina.    EKG on admission showed sinus rhythm with left bundle branch block high-sensitivity troponin was  obtained.  Initial value 153, repeat 119.  Chest x-ray showed no active disease with no pulmonary edema.    Patient does have erythema around the Zio patch and under and reports that is the location where the pain originates.  It is tender to palpation    No other fevers chill sweats nausea vomiting or diarrhea    Review of systems otherwise negative x 14 point review of systems except as mentioned below  Historical data copied forward from previous encounters in EMR and unchanged    EKG is unchanged with conduction abnormality bundle branch block      Review of systems:    Constitutional: No weakness, fatigue, fever, rigors, chills   Eyes: No vision changes, eye pain   ENT/oropharynx: No difficulty swallowing, sore throat, epistaxis, changes in hearing   Cardiovascular: No chest pain, chest tightness, palpitations, paroxysmal nocturnal dyspnea, orthopnea, diaphoresis, dizziness / syncopal episode   Respiratory: No shortness of breath, dyspnea on exertion, cough, wheezing, hemoptysis   Gastrointestinal: No abdominal pain, nausea, vomiting, diarrhea, bloody stools   Genitourinary: No hematuria, dysuria   Neurological: No headache, tremors, numbness, one-sided weakness    Musculoskeletal: No cramps, myalgias, joint pain, joint swelling   Integument: No rash, edema        Personal History:      Past Medical History:   Diagnosis Date    Atrial fibrillation     Hyperlipidemia     Hypertension        Past Surgical History:   Procedure Laterality Date    CARDIAC CATHETERIZATION N/A 04/22/2022    Procedure: Left Heart Cath;  Surgeon: Truong Ortiz MD;  Location: [facility] INVASIVE LOCATION;  Service: Cardiology;  Laterality: N/A;    CARDIAC CATHETERIZATION N/A 12/1/2023    Procedure: Left Heart Cath;  Surgeon: Truong Ortiz MD;  Location: [facility] INVASIVE LOCATION;  Service: Cardiology;  Laterality: N/A;    CORONARY ANGIOPLASTY WITH STENT PLACEMENT  12/01/2023    Left heart cath with angioplasty  and kissing stents to LAD and diagonal by Dr Ortiz       Family History   Problem Relation Age of Onset    Heart disease Mother     Heart disease Father        Social History     Tobacco Use    Smoking status: Never    Smokeless tobacco: Never   Vaping Use    Vaping Use: Never used   Substance Use Topics    Alcohol use: Yes     Alcohol/week: 6.0 standard drinks of alcohol     Types: 6 Cans of beer per week     Comment: a year     Drug use: Never        Home meds:  Prior to Admission medications    Medication Sig Start Date End Date Taking? Authorizing Provider   amiodarone (PACERONE) 400 MG tablet Take 1 tablet by mouth Every 12 (Twelve) Hours for 5 days. 12/5/23 12/10/23 Yes Lisa Guerra APRN   apixaban (ELIQUIS) 5 MG tablet tablet Take 1 tablet by mouth Every 12 (Twelve) Hours for 120 days. Indications: Atrial Fibrillation 12/4/23 4/2/24 Yes Lisa Guerra APRN   aspirin 81 MG chewable tablet Chew 1 tablet Daily.   Yes ProviderCharity MD   clopidogrel (PLAVIX) 75 MG tablet Take 1 tablet by mouth Daily. 12/5/23  Yes Lisa Guerra APRN   finasteride (PROSCAR) 5 MG tablet Take 1 tablet by mouth Daily. 5/14/20  Yes ProviderCharity MD   metoprolol succinate XL (TOPROL-XL) 25 MG 24 hr tablet Take 0.5 tablets by mouth Daily. 12/5/23  Yes Lisa Guerra APRN   nitroglycerin (NITROSTAT) 0.4 MG SL tablet Place 1 tablet under the tongue Every 5 (Five) Minutes As Needed for Chest Pain (Systolic BP Greater Than 100). Take no more than 3 doses in 15 minutes. 12/4/23  Yes Lisa Guerra APRN   rosuvastatin (CRESTOR) 20 MG tablet Take 1 tablet by mouth Every Night.   Yes ProviderCharity MD   sacubitril-valsartan (ENTRESTO) 24-26 MG tablet Take 1 tablet by mouth Every 12 (Twelve) Hours. 12/5/23  Yes Lisa Guerra APRN   vitamin D3 125 MCG (5000 UT) capsule capsule Take 1 capsule by mouth Daily.   Yes ProviderCharity MD   amiodarone (PACERONE) 200 MG tablet Take 1 tablet by mouth 2 (Two) Times a Day for 30  "days. 12/10/23 1/9/24  Charlie LisaJAMEE   rosuvastatin (CRESTOR) 20 MG tablet TAKE 1 TABLET BY MOUTH EVERY DAY 9/21/23 12/7/23  Truong Ortiz MD       Allergies:     Patient has no known allergies.    Scheduled Meds:[Held by provider] amiodarone, 200 mg, Oral, BID  amiodarone, 400 mg, Oral, Q12H  apixaban, 5 mg, Oral, Q12H  aspirin, 81 mg, Oral, Daily  clopidogrel, 75 mg, Oral, Daily  finasteride, 5 mg, Oral, Daily  metoprolol succinate XL, 12.5 mg, Oral, Daily  pantoprazole, 40 mg, Intravenous, Q AM  rosuvastatin, 20 mg, Oral, Daily  sacubitril-valsartan, 1 tablet, Oral, Q12H  senna-docusate sodium, 2 tablet, Oral, BID  sodium chloride, 10 mL, Intravenous, Q12H      Continuous Infusions:   PRN Meds:  senna-docusate sodium **AND** polyethylene glycol **AND** bisacodyl **AND** bisacodyl    Calcium Replacement - Follow Nurse / BPA Driven Protocol    Magnesium Standard Dose Replacement - Follow Nurse / BPA Driven Protocol    ondansetron **OR** ondansetron    Phosphorus Replacement - Follow Nurse / BPA Driven Protocol    Potassium Replacement - Follow Nurse / BPA Driven Protocol    [COMPLETED] Insert Peripheral IV **AND** sodium chloride    sodium chloride    sodium chloride      OBJECTIVE    Vital Signs  Vitals:    12/06/23 2059 12/06/23 2249 12/07/23 0607 12/07/23 0850   BP:  121/83 130/89 124/85   BP Location:  Left arm Left arm Left arm   Patient Position:  Lying Lying Lying   Pulse: 58 61 59 59   Resp:  19 18 18   Temp:  98.7 °F (37.1 °C) 98.4 °F (36.9 °C) 97.9 °F (36.6 °C)   TempSrc:  Oral Oral Oral   SpO2: 94% 100% 97% 96%   Weight:  112 kg (246 lb 14.6 oz)     Height:  182.9 cm (72.01\")         Flowsheet Rows      Flowsheet Row First Filed Value   Admission Height 182.9 cm (72\") Documented at 12/06/2023 1634   Admission Weight 112 kg (246 lb 14.6 oz) Documented at 12/06/2023 1634              Intake/Output Summary (Last 24 hours) at 12/7/2023 0943  Last data filed at 12/7/2023 0800  Gross per 24 " hour   Intake 240 ml   Output 900 ml   Net -660 ml        Telemetry: Sinus rhythm    Physical Exam:  The patient is alert, oriented and in no distress.  Vital signs as noted above.  Head and neck revealed no carotid bruits or jugular venous distention.  No thyromegaly or lymphadenopathy is present  Lungs clear.  No wheezing.  Breath sounds are normal bilaterally.  Heart: Normal first and second heart sounds. No murmur.  No precordial rub is present.  No gallop is present.  Abdomen: Soft and nontender.  No organomegaly is present.  Extremities with good peripheral pulses without any pedal edema.  Skin: Warm and dry.  Erythema under and around his area of Zio patch in the left upper chest  Musculoskeletal system is grossly normal.  CNS grossly normal.   Agree with exam as discussed with nurse practitioner to face-to-face encounter, findings above    Results Review:  I have personally reviewed the results from the time of this admission to 12/7/2023 09:43 EST and agree with these findings:  []  Laboratory  []  Microbiology  []  Radiology  []  EKG/Telemetry   []  Cardiology/Vascular   []  Pathology  []  Old records  []  Other:    Most notable findings include:     Lab Results (last 24 hours)       Procedure Component Value Units Date/Time    Basic Metabolic Panel [[id number]]  (Abnormal) Collected: 12/07/23 0659    Specimen: Blood Updated: 12/07/23 0811     Glucose 108 mg/dL      BUN 17 mg/dL      Creatinine 1.02 mg/dL      Sodium 137 mmol/L      Potassium 4.6 mmol/L      Chloride 101 mmol/L      CO2 25.0 mmol/L      Calcium 9.3 mg/dL      BUN/Creatinine Ratio 16.7     Anion Gap 11.0 mmol/L      eGFR 80.1 mL/min/1.73     Narrative:      GFR Normal >60  Chronic Kidney Disease <60  Kidney Failure <15      CBC & Differential [[id number]]  (Normal) Collected: 12/07/23 0659    Specimen: Blood Updated: 12/07/23 0756    Narrative:      The following orders were created for panel order CBC & Differential.  Procedure                                Abnormality         Status                     ---------                               -----------         ------                     CBC Auto Differential[[id number]]        Normal              Final result                 Please view results for these tests on the individual orders.    CBC Auto Differential [[id number]]  (Normal) Collected: 12/07/23 0659    Specimen: Blood Updated: 12/07/23 0756     WBC 5.40 10*3/mm3      RBC 4.81 10*6/mm3      Hemoglobin 15.1 g/dL      Hematocrit 45.2 %      MCV 94.1 fL      MCH 31.4 pg      MCHC 33.3 g/dL      RDW 14.2 %      RDW-SD 48.1 fl      MPV 8.3 fL      Platelets 171 10*3/mm3      Neutrophil % 60.1 %      Lymphocyte % 28.6 %      Monocyte % 8.4 %      Eosinophil % 2.2 %      Basophil % 0.7 %      Neutrophils, Absolute 3.30 10*3/mm3      Lymphocytes, Absolute 1.60 10*3/mm3      Monocytes, Absolute 0.50 10*3/mm3      Eosinophils, Absolute 0.10 10*3/mm3      Basophils, Absolute 0.00 10*3/mm3      nRBC 0.1 /100 WBC     High Sensitivity Troponin T 2Hr [[id number]]  (Abnormal) Collected: 12/06/23 1912    Specimen: Blood Updated: 12/06/23 1944     HS Troponin T 119 ng/L      Troponin T Delta -34 ng/L     Narrative:      High Sensitive Troponin T Reference Range:  <14.0 ng/L- Negative Female for AMI  <22.0 ng/L- Negative Male for AMI  >=14 - Abnormal Female indicating possible myocardial injury.  >=22 - Abnormal Male indicating possible myocardial injury.   Clinicians would have to utilize clinical acumen, EKG, Troponin, and serial changes to determine if it is an Acute Myocardial Infarction or myocardial injury due to an underlying chronic condition.         Extra Tubes [[id number]] Collected: 12/06/23 1708    Specimen: Blood, Venous Line Updated: 12/06/23 1815    Narrative:      The following orders were created for panel order Extra Tubes.  Procedure                               Abnormality         Status                     ---------                                -----------         ------                     Gold Top - SST[[id number]]                                   Final result                 Please view results for these tests on the individual orders.    Gold Top - SST [[id number]] Collected: 12/06/23 1708    Specimen: Blood Updated: 12/06/23 1815     Extra Tube Hold for add-ons.     Comment: Auto resulted.       Single High Sensitivity Troponin T [[id number]]  (Abnormal) Collected: 12/06/23 1708    Specimen: Blood Updated: 12/06/23 1746     HS Troponin T 153 ng/L     Narrative:      High Sensitive Troponin T Reference Range:  <14.0 ng/L- Negative Female for AMI  <22.0 ng/L- Negative Male for AMI  >=14 - Abnormal Female indicating possible myocardial injury.  >=22 - Abnormal Male indicating possible myocardial injury.   Clinicians would have to utilize clinical acumen, EKG, Troponin, and serial changes to determine if it is an Acute Myocardial Infarction or myocardial injury due to an underlying chronic condition.         Basic Metabolic Panel [[id number]]  (Abnormal) Collected: 12/06/23 1708    Specimen: Blood Updated: 12/06/23 1737     Glucose 106 mg/dL      BUN 25 mg/dL      Creatinine 1.11 mg/dL      Sodium 140 mmol/L      Potassium 4.5 mmol/L      Chloride 104 mmol/L      CO2 27.0 mmol/L      Calcium 9.5 mg/dL      BUN/Creatinine Ratio 22.5     Anion Gap 9.0 mmol/L      eGFR 72.3 mL/min/1.73     Narrative:      GFR Normal >60  Chronic Kidney Disease <60  Kidney Failure <15      Protime-INR [[id number]]  (Normal) Collected: 12/06/23 1708    Specimen: Blood Updated: 12/06/23 1729     Protime 10.9 Seconds      INR 1.00    aPTT [[id number]]  (Abnormal) Collected: 12/06/23 1708    Specimen: Blood Updated: 12/06/23 1729     PTT 26.4 seconds     CBC & Differential [[id number]]  (Normal) Collected: 12/06/23 1708    Specimen: Blood Updated: 12/06/23 1714    Narrative:      The following orders were created for panel order CBC & Differential.  Procedure                                Abnormality         Status                     ---------                               -----------         ------                     CBC Auto Differential[[id number]]        Normal              Final result                 Please view results for these tests on the individual orders.    CBC Auto Differential [[id number]]  (Normal) Collected: 12/06/23 1708    Specimen: Blood Updated: 12/06/23 1714     WBC 5.70 10*3/mm3      RBC 4.49 10*6/mm3      Hemoglobin 14.4 g/dL      Hematocrit 42.9 %      MCV 95.6 fL      MCH 32.0 pg      MCHC 33.5 g/dL      RDW 14.3 %      RDW-SD 47.3 fl      MPV 7.8 fL      Platelets 198 10*3/mm3      Neutrophil % 52.6 %      Lymphocyte % 34.0 %      Monocyte % 10.3 %      Eosinophil % 2.6 %      Basophil % 0.5 %      Neutrophils, Absolute 3.00 10*3/mm3      Lymphocytes, Absolute 2.00 10*3/mm3      Monocytes, Absolute 0.60 10*3/mm3      Eosinophils, Absolute 0.10 10*3/mm3      Basophils, Absolute 0.00 10*3/mm3      nRBC 0.0 /100 WBC             Imaging Results (Last 24 Hours)       Procedure Component Value Units Date/Time    XR Chest 1 View [[id number]] Collected: 12/06/23 1745     Updated: 12/06/23 1749    Narrative:      XR CHEST 1 VW    Date of Exam: 12/6/2023 5:32 PM EST    Indication: chest pain    Comparison: PE protocol chest and 2 from 10/18/2023, portable chest radiograph dated 12/17/2016    Findings:    The lungs are grossly clear. Cardiac, hilar, and mediastinal silhouettes are stable with moderate cardiomegaly. Pulmonary vascularity is within upper range of normal. No pneumothorax or pleural effusions. The trachea is midline.  No acute bony   abnormality or aggressive appearing focal osseous lesions in the visualized bony thorax.           Impression:      Impression:  Stable cardiomegaly. No active cardiopulmonary disease.        Electronically Signed: Kishor Segovia DO    12/6/2023 5:47 PM EST    Workstation ID: [id number]            LAB RESULTS (LAST 7 DAYS)    CBC  Results  from last 7 days   Lab Units 12/07/23  0659 12/06/23  1708 12/02/23  0137 12/01/23  0918   WBC 10*3/mm3 5.40 5.70 6.30 6.80   RBC 10*6/mm3 4.81 4.49 4.33 4.67   HEMOGLOBIN g/dL 15.1 14.4 13.6 14.9   HEMATOCRIT % 45.2 42.9 40.7 43.7   MCV fL 94.1 95.6 94.0 93.6   PLATELETS 10*3/mm3 171 198 168 197       BMP  Results from last 7 days   Lab Units 12/07/23  0659 12/06/23  1708 12/02/23  0137 12/01/23  0918   SODIUM mmol/L 137 140 140 141   POTASSIUM mmol/L 4.6 4.5 4.5 5.1   CHLORIDE mmol/L 101 104 105 105   CO2 mmol/L 25.0 27.0 25.0 29.0   BUN mg/dL 17 25* 17 21   CREATININE mg/dL 1.02 1.11 1.17 1.18   GLUCOSE mg/dL 108* 106* 110* 112*       CMP   Results from last 7 days   Lab Units 12/07/23  0659 12/06/23  1708 12/02/23  0137 12/01/23  0918   SODIUM mmol/L 137 140 140 141   POTASSIUM mmol/L 4.6 4.5 4.5 5.1   CHLORIDE mmol/L 101 104 105 105   CO2 mmol/L 25.0 27.0 25.0 29.0   BUN mg/dL 17 25* 17 21   CREATININE mg/dL 1.02 1.11 1.17 1.18   GLUCOSE mg/dL 108* 106* 110* 112*   ALBUMIN g/dL  --   --   --  4.5   BILIRUBIN mg/dL  --   --   --  1.2   ALK PHOS U/L  --   --   --  63   AST (SGOT) U/L  --   --   --  16   ALT (SGPT) U/L  --   --   --  28       BNP        TROPONIN  Results from last 7 days   Lab Units 12/06/23  1912   HSTROP T ng/L 119*       CoAg  Results from last 7 days   Lab Units 12/06/23  1708 12/01/23  0918   INR  1.00 1.07   APTT seconds 26.4*  --        Creatinine Clearance  Estimated Creatinine Clearance: 89.6 mL/min (by C-G formula based on SCr of 1.02 mg/dL).    ABG          Radiology  XR Chest 1 View    Result Date: 12/6/2023  Impression: Stable cardiomegaly. No active cardiopulmonary disease. Electronically Signed: Kishor Segovia DO  12/6/2023 5:47 PM EST  Workstation ID: [id number]       EKG  I personally viewed and interpreted the patient's EKG/Telemetry data:  ECG 12 Lead Chest Pain   Final Result   HEART RATE= 68  bpm   RR Interval= 884  ms   CO Interval= 199  ms   P Horizontal Axis= 27  deg   P  Front Axis= 46  deg   QRSD Interval= 158  ms   QT Interval= 453  ms   QTcB= 482  ms   QRS Axis= -53  deg   T Wave Axis= 149  deg   - ABNORMAL ECG -   Sinus rhythm   Left bundle branch block, old   ST elevation secondary to IVCD   Electronically Signed By: Cameron Alonso ([place]) 07-Dec-2023 09:21:52   Date and Time of Study: 2023-12-06 16:50:38      SCANNED - TELEMETRY     Final Result                  Echocardiogram:    Results for orders placed during the hospital encounter of 12/04/23    Adult Transesophageal Echo (BLANCHE) W/ Cont if Necessary Per Protocol    Interpretation Summary    There is (grade 1) plaque in the descending aorta present.    Severely reduced left ventricular systolic function  Dilated right ventricle with reduced function  Dilated left atrium  No left atrial appendage thrombus noted  Agitated saline/bubble study did not reveal interatrial shunt/PFO  Mild to moderate central mitral regurgitation.  Mild atheroma in descending aorta.        Stress Test:  Results for orders placed during the hospital encounter of 11/24/23    Stress Test With Myocardial Perfusion One Day    Interpretation Summary    Left ventricular ejection fraction is severely reduced (Calculated EF = 22%).    Abnormal LV wall motion consistent with severe hypokinesis.    Myocardial perfusion imaging indicates a moderate-sized infarct located in the anterior wall, septal wall and apex with moderate brad-infarct ischemia.    Impressions are consistent with a high risk study.    There is no prior study available for comparison. Resting and stress images were compared Resting images demonstrate decreased counts in the mid to apical anterior wall and anteroseptum consistent with prior MI Stress images demonstrate worsening of brad-infarct ischemia at least moderate in the anteroseptum, anterior wall apex and inferoseptum.  EF 22%, dilated ventricle  mL Abnormal study.    Findings consistent with an equivocal ECG stress  test.    Abnormal study  Moderate brad-infarct ischemia in the septum anterior wall and apex  Severely reduced EF 22%  Underlying atrial fibrillation with left bundle branch block  Dilated ventricle  Intermediate/ high risk study        Cardiac Catheterization:  Results for orders placed during the hospital encounter of 12/01/23    Cardiac Catheterization/Vascular Study    Narrative  Primary operative Truong Ortiz MD, PhD  [facility] cardiology  Date of service 12-1-2023    Procedure  1.  Left heart catheterization coronary angiography left ventriculography on MENESES position  2.  Shockwave balloon lithotripsy to the mid LAD diagonal bifurcation 2.5 x 12 utilization of 80 pulses circumferential calcium at least 180 degrees  3.,  Bifurcation PCI, 2.5 x 12 Xience drug-eluting stent postdilated at 20 dinh to the ostial proximal diagonal branch at 3 mm  4.,  LAD PCI 2.5 x 28 Xience drug-eluting stent in the midportion spanning the diagonal bifurcation, deployment at 20 dinh postdilatation 3.25 x 12 NC balloon at 18 to 20 dinh and distal to proximal fashion    Reduction of stenosis ostial diagonal branch 10% residual, less than 10% residual stenosis of LAD, HONORIO-3 flow pre and post, no distal wire trauma edge dissection or other complication    After informed consent patient brought to the Cath Lab sterilely prepped and draped in usual fashion exposure of the right groin for right common femoral arterial access via micropuncture and modified Seldinger technique with placement of a 6 Armenian sheath.  A 3 5 guidewires advanced aortic valve followed by diagnostic JL 4 and JR4 catheters for selective left and right coronary angiography respectively.  JR4 was used to cross aortic valve followed by hand-injection for LV gram EDP assessment and pullback assessment of the transaortic valve gradient.  There was obstructive disease in the mid LAD at the diagonal bifurcation at least 80% to 90% lara 1/1/1 morphology with 180  degrees of circumferential calcium.  There was progression year-over-year from last year comparative and angiography.  He has a newly reduced EF of 20%, previously 50% with abnormal stress test demonstrating reversibility of the anterior wall apex and septum.  Decision made for intervention.  Patient patient heparinized with 100 units/kg of heparin with repeated boluses maintain ACT greater than 250 on background therapy of aspirin.  A 7 Malay sheath was then upsized along with 7 Malay EBU 4.0 guide to engage left main, run-through wire to the diagonal branch and a BMW wire to the LAD, given calcification and bifurcation morphology, shockwave balloon lithotripsy with 2.5 x 12 balloon was performed with 80 pulses in the LAD.  We attempted to perform also of the ostial proximal diagonal branch however secondary wire wrapped we were unable to advance the deployed shockwave balloon.  This was ultimately removed followed by predilation of the ostial proximal diagonal segment with 2.5 x 12 NC balloon at 18 dinh with full expansion, further predilation was also performed in the LAD similarly, a 2.5 x 12 Xience drug stent was positioned the ostial proximal diagonal branch and a 2.5 x 28 Xience negative stent in the mid LAD, the diagonal lumen was deployed at 16 dinh followed by pullback of the stent balloon for postdilatation at 20 to 22 dinh for 30 seconds with great angiographic results, mild stepdown distally but otherwise much improvement of the ostial proximal segment.  This was removed followed by deployment of the LAD stent at 20 dinh which was viewed to be slightly undersized with improved runoff after predilation and nitroglycerin.  This was reinflated 22 dinh followed by postdilatation with 3.25 x 12 noncompliant balloon at 18 dinh and distal to proximal fashion with great angiographic results, 10% residual stenosis of the lesion, 20% residual stenosis in the ostial proximal diagonal branch.  Proximal optimization was  performed as well at 22 dinh above the diagonal takeoff with great results.  No distal wire trauma edge dissection or complications.  6 [place]ian Angio-Seal was used to close right, from arteriotomy with immediate hemostasis and maintenance of distal pulses.  He left Cath Lab chest pain-free hemodynamically electrically stable alert talkative staff neurologically gross intact bilaterally    Findings  1.  Opening aortic pressure of 109/62, closing pressure was unchanged  2.,  No LV gram performed to conserve contrast, LVEDP elevated 23-24  3.  Normal transorbital gradient on pullback    Angiography  1 left main normal no disease  2.  The LAD is a medium caliber vessel with 80 to 90% disease at the bifurcation including the ostial proximal diagonal branch which is also 80 to 90%.  Remainder the vessel diffuse luminal irregularities only 20%  3.  Circumflex large caliber nondominant vessel with minimal angiographic disease throughout less than 10%  Over 4 RCA is a large-caliber dominant vessel, diffuse luminal irregularities less than 20% throughout    Conclusions recommendations  1 successful bifurcation stenting, 10% residual stenosis in each vessel at the bifurcation status post shockwave balloon lithotripsy and high-pressure postdilatation and proximal optimization as described  2.  Aspirin and P2 Y12 B inhibitor will be continued with cessation of aspirin in 30 days.  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    Further recommendation follow findings and clinical course    Truong Ortiz MD, PhD        Other:      ASSESSMENT & PLAN:    Principal Problem:    Chest pain    Assessment:    Chest pain: Somewhat atypical for angina    -Troponin elevation but downtrending 153, 119    -Chronic left bundle branch block on EKG  CAD: Status post bifurcation stenting of the LAD 12/1/2023  Severe LV dysfunction: Appears euvolemic  Paroxysmal atrial fibrillation status post recent cardioversion currently maintaining SR  Hypertension  Dyslipidemia    Plan:    Continue dual antiplatelet therapy along with Eliquis  No plan for invasive repeat evaluation, optimal results, Films reviewed again today with great angiographic results of LAD diagonal bifurcation very low no logical likelihood of stent thrombosis on triple therapy  Continue Eliquis, Entresto, Toprol-XL, statin  Discontinue Zio patch with severe adhesive intolerance    Okay to discharge  Outpatient follow-up  If EF does not improve over the next 30 to 40 days would recommend biventricular ICD with resynchronization therapy      Truong Ortiz MD, PhD    Dannielle Colunga, JAMEE  12/07/23  09:43 EST

## 2023-12-07 NOTE — CASE MANAGEMENT/SOCIAL WORK
Discharge Planning Assessment   [facility]     Patient Name: Dillon Aaron  MRN: [id number]  Today's Date: 12/7/2023    Admit Date: 12/6/2023    Plan: Home   Discharge Needs Assessment       Row Name 12/07/23 1124       Living Environment    People in Home spouse    Current Living Arrangements home    Potentially Unsafe Housing Conditions none    In the past 12 months has the electric, gas, oil, or water company threatened to shut off services in your home? No    Primary Care Provided by self    Provides Primary Care For no one    Able to Return to Prior Arrangements yes       Resource/Environmental Concerns    Resource/Environmental Concerns none    Transportation Concerns none       Food Insecurity    Within the past 12 months, you worried that your food would run out before you got the money to buy more. Never true    Within the past 12 months, the food you bought just didn't last and you didn't have money to get more. Never true       Transition Planning    Patient/Family Anticipates Transition to home with family    Patient/Family Anticipated Services at Transition none    Transportation Anticipated family or friend will provide       Discharge Needs Assessment    Readmission Within the Last 30 Days no previous admission in last 30 days    Equipment Currently Used at Home none    Concerns to be Addressed denies needs/concerns at this time    Anticipated Changes Related to Illness none    Equipment Needed After Discharge none                   Discharge Plan       Row Name 12/07/23 1125       Plan    Plan Home    Plan Comments Met with Patient at bedside Lives at home with family. IADL's PCP and PHarmacy verified, able to afford medications. No transportation or finanical concerns. DC Barriers: cardiology consult                  Continued Care and Services - Admitted Since 12/6/2023    Coordination has not been started for this encounter.          Demographic Summary       Row Name 12/07/23 1124       General  Information    Admission Type observation    Arrived From emergency department    Referral Source admission list    Reason for Consult discharge planning    Preferred Language English                   Functional Status       Row Name 12/07/23 1124       Functional Status    Usual Activity Tolerance good    Current Activity Tolerance good       Functional Status, IADL    Medications independent    Meal Preparation independent    Housekeeping independent    Laundry independent    Shopping independent       Mental Status    General Appearance WDL WDL       Mental Status Summary    Recent Changes in Mental Status/Cognitive Functioning no changes                               Brooke Purvis, RN

## 2023-12-07 NOTE — PLAN OF CARE
Problem: Pain Acute  Goal: Acceptable Pain Control and Functional Ability  Outcome: Adequate for Care Transition  Intervention: Optimize Psychosocial Wellbeing  Recent Flowsheet Documentation  Taken 12/7/2023 0805 by Marita Rangel, RN  Diversional Activities: television     Problem: Chest Pain  Goal: Resolution of Chest Pain Symptoms  Outcome: Adequate for Care Transition   Goal Outcome Evaluation:

## 2023-12-07 NOTE — H&P
Patient Care Team:  Tre Burkett MD as PCP - General    Chief complaint chest pain    Subjective     Patient is a 67 y.o. male who presented to the ER with complaints of left sided chest pain that started this morning. He reports he recently had a stent placed a few days ago and had been feeling fine until this morning. The chest pain has been intermittent throughout the day today and radiates into his left shoulder. Denies any fever, chills, nausea, vomiting, diarrhea, shortness of breath, palpitations, diaphoresis. States he took 1 nitroglycerin at home with complete pain relief.   In the ER EKG sinus rhythm, LBBB rate 68. Troponin 153, repeat 119, wbc normal, bnp normal.     Onset of symptoms was 1 day      Chart review:  Cardiac Cath 12/1/23    Angiography  1 left main normal no disease  2.  The LAD is a medium caliber vessel with 80 to 90% disease at the bifurcation including the ostial proximal diagonal branch which is also 80 to 90%.  Remainder the vessel diffuse luminal irregularities only 20%  3.  Circumflex large caliber nondominant vessel with minimal angiographic disease throughout less than 10%  Over 4 RCA is a large-caliber dominant vessel, diffuse luminal irregularities less than 20% throughout     Conclusions recommendations  1 successful bifurcation stenting, 10% residual stenosis in each vessel at the bifurcation status post shockwave balloon lithotripsy and high-pressure postdilatation and proximal optimization as described  2.  Aspirin and P2 Y12 B inhibitor will be continued with cessation of aspirin in 30 days.  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    HENT: Negative.     Eyes: Negative.    Respiratory: Negative.     Cardiovascular:  Positive for chest pain.   Gastrointestinal: Negative.    Endocrine: Negative.    Genitourinary: Negative.    Musculoskeletal: Negative.    Skin: Negative.    Neurological: Negative.    Psychiatric/Behavioral: Negative.            History  Past Medical History:   Diagnosis Date    Atrial fibrillation     Hyperlipidemia     Hypertension      Past Surgical History:   Procedure Laterality Date    CARDIAC CATHETERIZATION N/A 04/22/2022    Procedure: Left Heart Cath;  Surgeon: Truong Ortiz MD;  Location: [facility] CATH INVASIVE LOCATION;  Service: Cardiology;  Laterality: N/A;    CARDIAC CATHETERIZATION N/A 12/1/2023    Procedure: Left Heart Cath;  Surgeon: Truong Ortiz MD;  Location:  [facility] CATH INVASIVE LOCATION;  Service: Cardiology;  Laterality: N/A;    CORONARY ANGIOPLASTY WITH STENT PLACEMENT  12/01/2023    Left heart cath with angioplasty and kissing stents to LAD and diagonal by Dr Ortiz     Family History   Problem Relation Age of Onset    Heart disease Mother     Heart disease Father      Social History     Tobacco Use    Smoking status: Never    Smokeless tobacco: Never   Vaping Use    Vaping Use: Never used   Substance Use Topics    Alcohol use: Yes     Alcohol/week: 6.0 standard drinks of alcohol     Types: 6 Cans of beer per week     Comment: a year     Drug use: Never     (Not in a hospital admission)    Allergies:  Patient has no known allergies.    Objective     Vital Signs  Temp:  [98.5 °F (36.9 °C)] 98.5 °F (36.9 °C)  Heart Rate:  [56-63] 58  Resp:  [16] 16  BP: (112-131)/(67-86) 122/79     Physical Exam:      General Appearance:    Alert,  cooperative, in no acute distress   Head:    Normocephalic, without obvious abnormality, atraumatic   Eyes:            Lids and lashes normal, conjunctivae and sclerae normal, no   icterus, no pallor, corneas clear, PERRLA   Ears:    Ears appear intact with no abnormalities noted   Throat:   No oral lesions, no thrush, oral mucosa moist   Neck:   No adenopathy, supple, trachea midline, no thyromegaly, no   carotid bruit, no JVD   Lungs:     Clear to auscultation,respirations regular, even and                  unlabored    Heart:    Regular rhythm and normal rate, normal S1 and S2, no            murmur, no gallop, no rub, no click   Chest Wall:    No abnormalities observed   Abdomen:     Normal bowel sounds, no masses, no organomegaly, soft        non-tender, non-distended, no guarding, no rebound                tenderness   Extremities:   Moves all extremities well, no edema, no cyanosis, no             redness   Pulses:   Pulses palpable and equal bilaterally   Skin:   No bleeding, bruising or rash   Lymph nodes:   No palpable adenopathy   Neurologic:   No focal deficits noted       Results Review:     Imaging Results (Last 24 Hours)       Procedure Component Value Units Date/Time    XR Chest 1 View [[id number]] Collected: 12/06/23 1745     Updated: 12/06/23 1749    Narrative:      XR CHEST 1 VW    Date of Exam: 12/6/2023 5:32 PM EST    Indication: chest pain    Comparison: PE protocol chest and 2 from 10/18/2023, portable chest radiograph dated 12/17/2016    Findings:    The lungs are grossly clear. Cardiac, hilar, and mediastinal silhouettes are stable with moderate cardiomegaly. Pulmonary vascularity is within upper range of normal. No pneumothorax or pleural effusions. The trachea is midline.  No acute bony   abnormality or aggressive appearing focal osseous lesions in the visualized bony thorax.           Impression:      Impression:  Stable cardiomegaly. No active cardiopulmonary disease.        Electronically  Signed: Kishor DO Luca    12/6/2023 5:47 PM EST    Workstation ID: [id number]             Lab Results (last 24 hours)       Procedure Component Value Units Date/Time    High Sensitivity Troponin T 2Hr [[id number]]  (Abnormal) Collected: 12/06/23 1912    Specimen: Blood Updated: 12/06/23 1944     HS Troponin T 119 ng/L      Troponin T Delta -34 ng/L     Narrative:      High Sensitive Troponin T Reference Range:  <14.0 ng/L- Negative Female for AMI  <22.0 ng/L- Negative Male for AMI  >=14 - Abnormal Female indicating possible myocardial injury.  >=22 - Abnormal Male indicating possible myocardial injury.   Clinicians would have to utilize clinical acumen, EKG, Troponin, and serial changes to determine if it is an Acute Myocardial Infarction or myocardial injury due to an underlying chronic condition.         Extra Tubes [[id number]] Collected: 12/06/23 1708    Specimen: Blood, Venous Line Updated: 12/06/23 1815    Narrative:      The following orders were created for panel order Extra Tubes.  Procedure                               Abnormality         Status                     ---------                               -----------         ------                     Gold Top - SST[[id number]]                                   Final result                 Please view results for these tests on the individual orders.    Gold Top - SST [[id number]] Collected: 12/06/23 1708    Specimen: Blood Updated: 12/06/23 1815     Extra Tube Hold for add-ons.     Comment: Auto resulted.       Single High Sensitivity Troponin T [[id number]]  (Abnormal) Collected: 12/06/23 1708    Specimen: Blood Updated: 12/06/23 1746     HS Troponin T 153 ng/L     Narrative:      High Sensitive Troponin T Reference Range:  <14.0 ng/L- Negative Female for AMI  <22.0 ng/L- Negative Male for AMI  >=14 - Abnormal Female indicating possible myocardial injury.  >=22 - Abnormal Male indicating possible myocardial injury.   Clinicians would have to utilize clinical  acumen, EKG, Troponin, and serial changes to determine if it is an Acute Myocardial Infarction or myocardial injury due to an underlying chronic condition.         Basic Metabolic Panel [[id number]]  (Abnormal) Collected: 12/06/23 1708    Specimen: Blood Updated: 12/06/23 1737     Glucose 106 mg/dL      BUN 25 mg/dL      Creatinine 1.11 mg/dL      Sodium 140 mmol/L      Potassium 4.5 mmol/L      Chloride 104 mmol/L      CO2 27.0 mmol/L      Calcium 9.5 mg/dL      BUN/Creatinine Ratio 22.5     Anion Gap 9.0 mmol/L      eGFR 72.3 mL/min/1.73     Narrative:      GFR Normal >60  Chronic Kidney Disease <60  Kidney Failure <15      Protime-INR [[id number]]  (Normal) Collected: 12/06/23 1708    Specimen: Blood Updated: 12/06/23 1729     Protime 10.9 Seconds      INR 1.00    aPTT [[id number]]  (Abnormal) Collected: 12/06/23 1708    Specimen: Blood Updated: 12/06/23 1729     PTT 26.4 seconds     CBC & Differential [[id number]]  (Normal) Collected: 12/06/23 1708    Specimen: Blood Updated: 12/06/23 1714    Narrative:      The following orders were created for panel order CBC & Differential.  Procedure                               Abnormality         Status                     ---------                               -----------         ------                     CBC Auto Differential[[id number]]        Normal              Final result                 Please view results for these tests on the individual orders.    CBC Auto Differential [[id number]]  (Normal) Collected: 12/06/23 1708    Specimen: Blood Updated: 12/06/23 1714     WBC 5.70 10*3/mm3      RBC 4.49 10*6/mm3      Hemoglobin 14.4 g/dL      Hematocrit 42.9 %      MCV 95.6 fL      MCH 32.0 pg      MCHC 33.5 g/dL      RDW 14.3 %      RDW-SD 47.3 fl      MPV 7.8 fL      Platelets 198 10*3/mm3      Neutrophil % 52.6 %      Lymphocyte % 34.0 %      Monocyte % 10.3 %      Eosinophil % 2.6 %      Basophil % 0.5 %      Neutrophils, Absolute 3.00 10*3/mm3      Lymphocytes, Absolute  2.00 10*3/mm3      Monocytes, Absolute 0.60 10*3/mm3      Eosinophils, Absolute 0.10 10*3/mm3      Basophils, Absolute 0.00 10*3/mm3      nRBC 0.0 /100 WBC              I reviewed the patient's new clinical results.    Assessment & Plan       Chest pain  -EKG sinus rhythm, LBBB rate 68  -Troponin 153, repeat 119  -wbc normal  -bnp normal  -cardiology consulted        DVT prophylaxis- SCD's  GI prophylaxis- protonix    I discussed the patient's findings and my recommendations with patient.     Josie Oleary, APRN  12/06/23  21:53 EST

## 2023-12-07 NOTE — DISCHARGE SUMMARY
Date of Discharge:  12/7/2023    Discharge Diagnosis:   **Chest pain [R07.9]   Contact dermatitis [L25.9]   Ischemic cardiomyopathy [I25.5]   CAD (coronary artery disease) [I25.10]   Mixed hyperlipidemia [E78.2]   Essential hypertension [I10]   Paroxysmal atrial fibrillation [I48.0]       Presenting Problem/History of Present Illness  Active Hospital Problems    Diagnosis  POA    **Chest pain [R07.9]  Yes    Contact dermatitis [L25.9]  Yes    Ischemic cardiomyopathy [I25.5]  Yes    CAD (coronary artery disease) [I25.10]  Yes    Mixed hyperlipidemia [E78.2]  Yes    Essential hypertension [I10]  Yes    Paroxysmal atrial fibrillation [I48.0]  Yes      Resolved Hospital Problems   No resolved problems to display.          Hospital Course  Patient is a 67 y.o. male with ischemic cardiomyopathy, chronic coronary artery disease, essential hypertension, atrial fib who presented with chest pain.  He ruled out for acute coronary syndrome.  Pain was reproducible with palpation and located at a site of significant irritation and erythema from his home heart monitor.  He also had irritated areas related to adhesive from cardiac monitor and cardioversion pads.  He is allergic to adhesive material.  No further cardiac intervention or evaluation was warranted.  He will be treated with topical triamcinolone cream and keep his regularly scheduled follow-up appointments with PCP and cardiologist.    Procedures Performed         Consults:   Consults       Date and Time Order Name Status Description    12/6/2023  8:20 PM Inpatient Cardiology Consult Completed     12/6/2023  7:47 PM Family Medicine Consult              Pertinent Test Results:    Lab Results (most recent)       Procedure Component Value Units Date/Time    Basic Metabolic Panel [[id number]]  (Abnormal) Collected: 12/07/23 0659    Specimen: Blood Updated: 12/07/23 0811     Glucose 108 mg/dL      BUN 17 mg/dL      Creatinine 1.02 mg/dL      Sodium 137 mmol/L      Potassium  4.6 mmol/L      Chloride 101 mmol/L      CO2 25.0 mmol/L      Calcium 9.3 mg/dL      BUN/Creatinine Ratio 16.7     Anion Gap 11.0 mmol/L      eGFR 80.1 mL/min/1.73     Narrative:      GFR Normal >60  Chronic Kidney Disease <60  Kidney Failure <15      CBC & Differential [[id number]]  (Normal) Collected: 12/07/23 0659    Specimen: Blood Updated: 12/07/23 0756    Narrative:      The following orders were created for panel order CBC & Differential.  Procedure                               Abnormality         Status                     ---------                               -----------         ------                     CBC Auto Differential[[id number]]        Normal              Final result                 Please view results for these tests on the individual orders.    CBC Auto Differential [[id number]]  (Normal) Collected: 12/07/23 0659    Specimen: Blood Updated: 12/07/23 0756     WBC 5.40 10*3/mm3      RBC 4.81 10*6/mm3      Hemoglobin 15.1 g/dL      Hematocrit 45.2 %      MCV 94.1 fL      MCH 31.4 pg      MCHC 33.3 g/dL      RDW 14.2 %      RDW-SD 48.1 fl      MPV 8.3 fL      Platelets 171 10*3/mm3      Neutrophil % 60.1 %      Lymphocyte % 28.6 %      Monocyte % 8.4 %      Eosinophil % 2.2 %      Basophil % 0.7 %      Neutrophils, Absolute 3.30 10*3/mm3      Lymphocytes, Absolute 1.60 10*3/mm3      Monocytes, Absolute 0.50 10*3/mm3      Eosinophils, Absolute 0.10 10*3/mm3      Basophils, Absolute 0.00 10*3/mm3      nRBC 0.1 /100 WBC     High Sensitivity Troponin T 2Hr [[id number]]  (Abnormal) Collected: 12/06/23 1912    Specimen: Blood Updated: 12/06/23 1944     HS Troponin T 119 ng/L      Troponin T Delta -34 ng/L     Narrative:      High Sensitive Troponin T Reference Range:  <14.0 ng/L- Negative Female for AMI  <22.0 ng/L- Negative Male for AMI  >=14 - Abnormal Female indicating possible myocardial injury.  >=22 - Abnormal Male indicating possible myocardial injury.   Clinicians would have to utilize clinical  acumen, EKG, Troponin, and serial changes to determine if it is an Acute Myocardial Infarction or myocardial injury due to an underlying chronic condition.         Extra Tubes [[id number]] Collected: 12/06/23 1708    Specimen: Blood, Venous Line Updated: 12/06/23 1815    Narrative:      The following orders were created for panel order Extra Tubes.  Procedure                               Abnormality         Status                     ---------                               -----------         ------                     Gold Top - SST[[id number]]                                   Final result                 Please view results for these tests on the individual orders.    Gold Top - SST [[id number]] Collected: 12/06/23 1708    Specimen: Blood Updated: 12/06/23 1815     Extra Tube Hold for add-ons.     Comment: Auto resulted.       Single High Sensitivity Troponin T [[id number]]  (Abnormal) Collected: 12/06/23 1708    Specimen: Blood Updated: 12/06/23 1746     HS Troponin T 153 ng/L     Narrative:      High Sensitive Troponin T Reference Range:  <14.0 ng/L- Negative Female for AMI  <22.0 ng/L- Negative Male for AMI  >=14 - Abnormal Female indicating possible myocardial injury.  >=22 - Abnormal Male indicating possible myocardial injury.   Clinicians would have to utilize clinical acumen, EKG, Troponin, and serial changes to determine if it is an Acute Myocardial Infarction or myocardial injury due to an underlying chronic condition.         Basic Metabolic Panel [[id number]]  (Abnormal) Collected: 12/06/23 1708    Specimen: Blood Updated: 12/06/23 1737     Glucose 106 mg/dL      BUN 25 mg/dL      Creatinine 1.11 mg/dL      Sodium 140 mmol/L      Potassium 4.5 mmol/L      Chloride 104 mmol/L      CO2 27.0 mmol/L      Calcium 9.5 mg/dL      BUN/Creatinine Ratio 22.5     Anion Gap 9.0 mmol/L      eGFR 72.3 mL/min/1.73     Narrative:      GFR Normal >60  Chronic Kidney Disease <60  Kidney Failure <15      Protime-INR  [[id number]]  (Normal) Collected: 12/06/23 1708    Specimen: Blood Updated: 12/06/23 1729     Protime 10.9 Seconds      INR 1.00    aPTT [[id number]]  (Abnormal) Collected: 12/06/23 1708    Specimen: Blood Updated: 12/06/23 1729     PTT 26.4 seconds     CBC & Differential [[id number]]  (Normal) Collected: 12/06/23 1708    Specimen: Blood Updated: 12/06/23 1714    Narrative:      The following orders were created for panel order CBC & Differential.  Procedure                               Abnormality         Status                     ---------                               -----------         ------                     CBC Auto Differential[[id number]]        Normal              Final result                 Please view results for these tests on the individual orders.    CBC Auto Differential [[id number]]  (Normal) Collected: 12/06/23 1708    Specimen: Blood Updated: 12/06/23 1714     WBC 5.70 10*3/mm3      RBC 4.49 10*6/mm3      Hemoglobin 14.4 g/dL      Hematocrit 42.9 %      MCV 95.6 fL      MCH 32.0 pg      MCHC 33.5 g/dL      RDW 14.3 %      RDW-SD 47.3 fl      MPV 7.8 fL      Platelets 198 10*3/mm3      Neutrophil % 52.6 %      Lymphocyte % 34.0 %      Monocyte % 10.3 %      Eosinophil % 2.6 %      Basophil % 0.5 %      Neutrophils, Absolute 3.00 10*3/mm3      Lymphocytes, Absolute 2.00 10*3/mm3      Monocytes, Absolute 0.60 10*3/mm3      Eosinophils, Absolute 0.10 10*3/mm3      Basophils, Absolute 0.00 10*3/mm3      nRBC 0.0 /100 WBC              Results for orders placed during the hospital encounter of 12/04/23    Adult Transesophageal Echo (BLANCHE) W/ Cont if Necessary Per Protocol    Interpretation Summary    There is (grade 1) plaque in the descending aorta present.    Severely reduced left ventricular systolic function  Dilated right ventricle with reduced function  Dilated left atrium  No left atrial appendage thrombus noted  Agitated saline/bubble study did not reveal interatrial shunt/PFO  Mild to moderate  central mitral regurgitation.  Mild atheroma in descending aorta.              Condition on Discharge:  Stable    Vital Signs  Temp:  [97.9 °F (36.6 °C)-98.7 °F (37.1 °C)] 98.4 °F (36.9 °C)  Heart Rate:  [56-62] 57  Resp:  [17-19] 17  BP: (112-130)/(69-89) 120/80    Physical Exam:     General Appearance:    Alert, cooperative, in no acute distress   Head:    Normocephalic, without obvious abnormality, atraumatic   Eyes:            Lids and lashes normal, conjunctivae and sclerae normal, no   icterus, no pallor, corneas clear, PERRLA   Ears:    Ears appear intact with no abnormalities noted   Throat:   No oral lesions, no thrush, oral mucosa moist   Neck:   No adenopathy, supple, trachea midline, no thyromegaly, no   carotid bruit, no JVD   Lungs:     Clear to auscultation,respirations regular, even and                  unlabored    Heart:    Irregular   Chest Wall:    No abnormalities observed   Abdomen:     Normal bowel sounds, no masses, no organomegaly, soft        non-tender, non-distended, no guarding, no rebound                tenderness   Extremities:   Moves all extremities well, no edema, no cyanosis, no             redness   Pulses:   Pulses palpable and equal bilaterally   Skin:   Moderate localized areas of erythema at site heart monitor on left anterior chest wall, tender to palpation   Lymph nodes:   No palpable adenopathy   Neurologic:   Cranial nerves 2 - 12 grossly intact, sensation intact, DTR       present and equal bilaterally       Discharge Disposition  Home or Self Care    Discharge Medications     Discharge Medications        New Medications        Instructions Start Date   triamcinolone 0.1 % cream  Commonly known as: KENALOG   1 application , Topical, Every 12 Hours Scheduled             Continue These Medications        Instructions Start Date   amiodarone 400 MG tablet  Commonly known as: PACERONE   400 mg, Oral, Every 12 Hours Scheduled      amiodarone 200 MG tablet  Commonly known as:  PACERONE   200 mg, Oral, 2 Times Daily   Start Date: December 10, 2023     apixaban 5 MG tablet tablet  Commonly known as: ELIQUIS   5 mg, Oral, Every 12 Hours Scheduled      aspirin 81 MG chewable tablet   81 mg, Oral, Daily      clopidogrel 75 MG tablet  Commonly known as: PLAVIX   75 mg, Oral, Daily      Entresto 24-26 MG tablet  Generic drug: sacubitril-valsartan   1 tablet, Oral, Every 12 Hours Scheduled      finasteride 5 MG tablet  Commonly known as: PROSCAR   5 mg, Oral, Daily      metoprolol succinate XL 25 MG 24 hr tablet  Commonly known as: TOPROL-XL   12.5 mg, Oral, Daily      nitroglycerin 0.4 MG SL tablet  Commonly known as: NITROSTAT   0.4 mg, Sublingual, Every 5 Minutes PRN, Take no more than 3 doses in 15 minutes.      rosuvastatin 20 MG tablet  Commonly known as: CRESTOR   20 mg, Oral, Nightly      vitamin D3 125 MCG (5000 UT) capsule capsule   5,000 Units, Oral, Daily               Discharge Diet:  Healthy heart    Activity at Discharge: No restrictions    Follow-up Appointments  Future Appointments   Date Time Provider Department Center   3/6/2024  1:30 PM Truong Ortiz MD [facility]     Additional Instructions for the Follow-ups that You Need to Schedule       Discharge Follow-up with PCP   As directed       Currently Documented PCP:    Tre Burkett MD    PCP Phone Number:    [phone]     Follow Up Details: Keep next regularly scheduled appointment.        Discharge Follow-up with Specified Provider: Keep next regularly scheduled appointment.   As directed      To: Keep next regularly scheduled appointment.                Test Results Pending at Discharge       Vi Johnston MD  12/07/23  17:30 EST    Time: Discharge 25 min

## 2023-12-07 NOTE — PLAN OF CARE
Goal Outcome Evaluation:      Pt is here with chest pain, VSS, tele, cardiology consult with Dr Ortiz, recent heart cath, Heart monitor, Will continue to monitor.

## 2023-12-13 ENCOUNTER — TELEPHONE (OUTPATIENT)
Dept: CARDIAC REHAB | Facility: HOSPITAL | Age: 68
End: 2023-12-13
Payer: MEDICARE

## 2023-12-13 NOTE — TELEPHONE ENCOUNTER
Attempted to call to see if interested in CR program. No answer. Voicemail left with callback requested.

## 2023-12-18 ENCOUNTER — TRANSCRIBE ORDERS (OUTPATIENT)
Dept: CARDIAC REHAB | Facility: HOSPITAL | Age: 68
End: 2023-12-18
Payer: MEDICARE

## 2023-12-18 DIAGNOSIS — Z95.5 STATUS POST CORONARY ARTERY STENT PLACEMENT: Primary | ICD-10-CM

## 2023-12-19 ENCOUNTER — TELEPHONE (OUTPATIENT)
Dept: CARDIAC REHAB | Facility: HOSPITAL | Age: 68
End: 2023-12-19
Payer: MEDICARE

## 2023-12-26 ENCOUNTER — LAB (OUTPATIENT)
Dept: LAB | Facility: HOSPITAL | Age: 68
End: 2023-12-26
Payer: MEDICARE

## 2023-12-26 DIAGNOSIS — I25.5 ISCHEMIC CARDIOMYOPATHY: ICD-10-CM

## 2023-12-26 DIAGNOSIS — I48.19 PERSISTENT ATRIAL FIBRILLATION: ICD-10-CM

## 2023-12-26 LAB
ALBUMIN SERPL-MCNC: 4.6 G/DL (ref 3.5–5.2)
ALBUMIN/GLOB SERPL: 2.3 G/DL
ALP SERPL-CCNC: 75 U/L (ref 39–117)
ALT SERPL W P-5'-P-CCNC: 29 U/L (ref 1–41)
ANION GAP SERPL CALCULATED.3IONS-SCNC: 13.2 MMOL/L (ref 5–15)
AST SERPL-CCNC: 13 U/L (ref 1–40)
BASOPHILS # BLD AUTO: 0.03 10*3/MM3 (ref 0–0.2)
BASOPHILS NFR BLD AUTO: 0.4 % (ref 0–1.5)
BILIRUB SERPL-MCNC: 0.7 MG/DL (ref 0–1.2)
BUN SERPL-MCNC: 15 MG/DL (ref 8–23)
BUN/CREAT SERPL: 11.9 (ref 7–25)
CALCIUM SPEC-SCNC: 9.7 MG/DL (ref 8.6–10.5)
CHLORIDE SERPL-SCNC: 106 MMOL/L (ref 98–107)
CO2 SERPL-SCNC: 22.8 MMOL/L (ref 22–29)
CREAT SERPL-MCNC: 1.26 MG/DL (ref 0.76–1.27)
DEPRECATED RDW RBC AUTO: 44.8 FL (ref 37–54)
EGFRCR SERPLBLD CKD-EPI 2021: 62.1 ML/MIN/1.73
EOSINOPHIL # BLD AUTO: 0.08 10*3/MM3 (ref 0–0.4)
EOSINOPHIL NFR BLD AUTO: 1.1 % (ref 0.3–6.2)
ERYTHROCYTE [DISTWIDTH] IN BLOOD BY AUTOMATED COUNT: 13.1 % (ref 12.3–15.4)
GLOBULIN UR ELPH-MCNC: 2 GM/DL
GLUCOSE SERPL-MCNC: 110 MG/DL (ref 65–99)
HCT VFR BLD AUTO: 47.6 % (ref 37.5–51)
HGB BLD-MCNC: 16.2 G/DL (ref 13–17.7)
IMM GRANULOCYTES # BLD AUTO: 0.02 10*3/MM3 (ref 0–0.05)
IMM GRANULOCYTES NFR BLD AUTO: 0.3 % (ref 0–0.5)
INR PPP: 1 (ref 0.93–1.1)
LYMPHOCYTES # BLD AUTO: 2.15 10*3/MM3 (ref 0.7–3.1)
LYMPHOCYTES NFR BLD AUTO: 28.9 % (ref 19.6–45.3)
MAGNESIUM SERPL-MCNC: 2.3 MG/DL (ref 1.6–2.4)
MCH RBC QN AUTO: 31.8 PG (ref 26.6–33)
MCHC RBC AUTO-ENTMCNC: 34 G/DL (ref 31.5–35.7)
MCV RBC AUTO: 93.5 FL (ref 79–97)
MONOCYTES # BLD AUTO: 0.48 10*3/MM3 (ref 0.1–0.9)
MONOCYTES NFR BLD AUTO: 6.5 % (ref 5–12)
NEUTROPHILS NFR BLD AUTO: 4.67 10*3/MM3 (ref 1.7–7)
NEUTROPHILS NFR BLD AUTO: 62.8 % (ref 42.7–76)
NRBC BLD AUTO-RTO: 0 /100 WBC (ref 0–0.2)
PLATELET # BLD AUTO: 195 10*3/MM3 (ref 140–450)
PMV BLD AUTO: 10.3 FL (ref 6–12)
POTASSIUM SERPL-SCNC: 4.6 MMOL/L (ref 3.5–5.2)
PROT SERPL-MCNC: 6.6 G/DL (ref 6–8.5)
PROTHROMBIN TIME: 10.9 SECONDS (ref 9.6–11.7)
RBC # BLD AUTO: 5.09 10*6/MM3 (ref 4.14–5.8)
SODIUM SERPL-SCNC: 142 MMOL/L (ref 136–145)
WBC NRBC COR # BLD AUTO: 7.43 10*3/MM3 (ref 3.4–10.8)

## 2023-12-26 PROCEDURE — 83735 ASSAY OF MAGNESIUM: CPT

## 2023-12-26 PROCEDURE — 85610 PROTHROMBIN TIME: CPT

## 2023-12-26 PROCEDURE — 80053 COMPREHEN METABOLIC PANEL: CPT

## 2023-12-26 PROCEDURE — 36415 COLL VENOUS BLD VENIPUNCTURE: CPT

## 2023-12-26 PROCEDURE — 85025 COMPLETE CBC W/AUTO DIFF WBC: CPT

## 2023-12-28 ENCOUNTER — ANESTHESIA (OUTPATIENT)
Dept: CARDIOLOGY | Facility: HOSPITAL | Age: 68
End: 2023-12-28
Payer: MEDICARE

## 2023-12-28 ENCOUNTER — ANESTHESIA EVENT (OUTPATIENT)
Dept: CARDIOLOGY | Facility: HOSPITAL | Age: 68
End: 2023-12-28
Payer: MEDICARE

## 2023-12-28 ENCOUNTER — HOSPITAL ENCOUNTER (OUTPATIENT)
Facility: HOSPITAL | Age: 68
Discharge: HOME | End: 2023-12-29
Attending: INTERNAL MEDICINE | Admitting: INTERNAL MEDICINE
Payer: MEDICARE

## 2023-12-28 DIAGNOSIS — I48.19 PERSISTENT ATRIAL FIBRILLATION: ICD-10-CM

## 2023-12-28 PROBLEM — I48.91 A-FIB: Status: ACTIVE | Noted: 2023-12-28

## 2023-12-28 LAB
ACT BLD: 320 SECONDS (ref 89–137)
ACT BLD: 320 SECONDS (ref 89–137)
ACT BLD: 331 SECONDS (ref 89–137)
ACT BLD: 363 SECONDS (ref 89–137)

## 2023-12-28 PROCEDURE — 85347 COAGULATION TIME ACTIVATED: CPT

## 2023-12-28 PROCEDURE — 25010000002 SUGAMMADEX 200 MG/2ML SOLUTION: Performed by: NURSE ANESTHETIST, CERTIFIED REGISTERED

## 2023-12-28 PROCEDURE — 25810000003 SODIUM CHLORIDE 0.9 % SOLUTION: Performed by: INTERNAL MEDICINE

## 2023-12-28 PROCEDURE — 93656 COMPRE EP EVAL ABLTJ ATR FIB: CPT | Performed by: INTERNAL MEDICINE

## 2023-12-28 PROCEDURE — C1759 CATH, INTRA ECHOCARDIOGRAPHY: HCPCS | Performed by: INTERNAL MEDICINE

## 2023-12-28 PROCEDURE — C1760 CLOSURE DEV, VASC: HCPCS | Performed by: INTERNAL MEDICINE

## 2023-12-28 PROCEDURE — C1894 INTRO/SHEATH, NON-LASER: HCPCS | Performed by: INTERNAL MEDICINE

## 2023-12-28 PROCEDURE — 25010000002 PHENYLEPHRINE 10 MG/ML SOLUTION: Performed by: NURSE ANESTHETIST, CERTIFIED REGISTERED

## 2023-12-28 PROCEDURE — 25010000002 PROTAMINE SULFATE PER 10 MG: Performed by: NURSE ANESTHETIST, CERTIFIED REGISTERED

## 2023-12-28 PROCEDURE — 93655 ICAR CATH ABLTJ DSCRT ARRHYT: CPT | Performed by: INTERNAL MEDICINE

## 2023-12-28 PROCEDURE — C1769 GUIDE WIRE: HCPCS | Performed by: INTERNAL MEDICINE

## 2023-12-28 PROCEDURE — C1766 INTRO/SHEATH,STRBLE,NON-PEEL: HCPCS | Performed by: INTERNAL MEDICINE

## 2023-12-28 PROCEDURE — 25010000002 FENTANYL CITRATE (PF) 100 MCG/2ML SOLUTION: Performed by: NURSE ANESTHETIST, CERTIFIED REGISTERED

## 2023-12-28 PROCEDURE — 25010000002 HEPARIN (PORCINE) PER 1000 UNITS: Performed by: NURSE ANESTHETIST, CERTIFIED REGISTERED

## 2023-12-28 PROCEDURE — C1730 CATH, EP, 19 OR FEW ELECT: HCPCS | Performed by: INTERNAL MEDICINE

## 2023-12-28 PROCEDURE — C1732 CATH, EP, DIAG/ABL, 3D/VECT: HCPCS | Performed by: INTERNAL MEDICINE

## 2023-12-28 PROCEDURE — 25010000002 PROPOFOL 10 MG/ML EMULSION: Performed by: NURSE ANESTHETIST, CERTIFIED REGISTERED

## 2023-12-28 PROCEDURE — 25010000002 HEPARIN (PORCINE) 25000-0.45 UT/250ML-% SOLUTION: Performed by: NURSE ANESTHETIST, CERTIFIED REGISTERED

## 2023-12-28 PROCEDURE — 25010000002 DEXAMETHASONE PER 1 MG: Performed by: NURSE ANESTHETIST, CERTIFIED REGISTERED

## 2023-12-28 PROCEDURE — 25010000002 PHENYLEPHRINE 10 MG/ML SOLUTION 5 ML VIAL: Performed by: NURSE ANESTHETIST, CERTIFIED REGISTERED

## 2023-12-28 PROCEDURE — 25810000003 SODIUM CHLORIDE 0.9 % SOLUTION: Performed by: NURSE ANESTHETIST, CERTIFIED REGISTERED

## 2023-12-28 PROCEDURE — 25510000001 IOPAMIDOL PER 1 ML: Performed by: INTERNAL MEDICINE

## 2023-12-28 PROCEDURE — 25810000003 SODIUM CHLORIDE 0.9 % SOLUTION 250 ML FLEX CONT: Performed by: NURSE ANESTHETIST, CERTIFIED REGISTERED

## 2023-12-28 PROCEDURE — C1733 CATH, EP, OTHR THAN COOL-TIP: HCPCS | Performed by: INTERNAL MEDICINE

## 2023-12-28 RX ORDER — FINASTERIDE 5 MG/1
5 TABLET, FILM COATED ORAL DAILY
Status: DISCONTINUED | OUTPATIENT
Start: 2023-12-28 | End: 2023-12-29 | Stop reason: HOSPADM

## 2023-12-28 RX ORDER — HEPARIN SODIUM 1000 [USP'U]/ML
INJECTION, SOLUTION INTRAVENOUS; SUBCUTANEOUS AS NEEDED
Status: DISCONTINUED | OUTPATIENT
Start: 2023-12-28 | End: 2023-12-28 | Stop reason: SURG

## 2023-12-28 RX ORDER — DEXAMETHASONE SODIUM PHOSPHATE 4 MG/ML
INJECTION, SOLUTION INTRA-ARTICULAR; INTRALESIONAL; INTRAMUSCULAR; INTRAVENOUS; SOFT TISSUE AS NEEDED
Status: DISCONTINUED | OUTPATIENT
Start: 2023-12-28 | End: 2023-12-28 | Stop reason: SURG

## 2023-12-28 RX ORDER — ONDANSETRON 2 MG/ML
4 INJECTION INTRAMUSCULAR; INTRAVENOUS ONCE AS NEEDED
Status: DISCONTINUED | OUTPATIENT
Start: 2023-12-28 | End: 2023-12-29 | Stop reason: HOSPADM

## 2023-12-28 RX ORDER — IPRATROPIUM BROMIDE AND ALBUTEROL SULFATE 2.5; .5 MG/3ML; MG/3ML
3 SOLUTION RESPIRATORY (INHALATION) ONCE AS NEEDED
Status: DISCONTINUED | OUTPATIENT
Start: 2023-12-28 | End: 2023-12-29 | Stop reason: HOSPADM

## 2023-12-28 RX ORDER — DIPHENHYDRAMINE HYDROCHLORIDE 50 MG/ML
12.5 INJECTION INTRAMUSCULAR; INTRAVENOUS
Status: DISCONTINUED | OUTPATIENT
Start: 2023-12-28 | End: 2023-12-29 | Stop reason: HOSPADM

## 2023-12-28 RX ORDER — NITROGLYCERIN 0.4 MG/1
0.4 TABLET SUBLINGUAL
Status: DISCONTINUED | OUTPATIENT
Start: 2023-12-28 | End: 2023-12-29 | Stop reason: HOSPADM

## 2023-12-28 RX ORDER — FENTANYL CITRATE 50 UG/ML
INJECTION, SOLUTION INTRAMUSCULAR; INTRAVENOUS AS NEEDED
Status: DISCONTINUED | OUTPATIENT
Start: 2023-12-28 | End: 2023-12-28 | Stop reason: SURG

## 2023-12-28 RX ORDER — ROCURONIUM BROMIDE 10 MG/ML
INJECTION, SOLUTION INTRAVENOUS AS NEEDED
Status: DISCONTINUED | OUTPATIENT
Start: 2023-12-28 | End: 2023-12-28 | Stop reason: SURG

## 2023-12-28 RX ORDER — HEPARIN SODIUM 10000 [USP'U]/100ML
INJECTION, SOLUTION INTRAVENOUS CONTINUOUS PRN
Status: DISCONTINUED | OUTPATIENT
Start: 2023-12-28 | End: 2023-12-28 | Stop reason: SURG

## 2023-12-28 RX ORDER — SODIUM CHLORIDE 9 MG/ML
INJECTION, SOLUTION INTRAVENOUS CONTINUOUS PRN
Status: DISCONTINUED | OUTPATIENT
Start: 2023-12-28 | End: 2023-12-28 | Stop reason: SURG

## 2023-12-28 RX ORDER — ASPIRIN 81 MG/1
81 TABLET ORAL DAILY
COMMUNITY
End: 2023-12-29 | Stop reason: HOSPADM

## 2023-12-28 RX ORDER — EPHEDRINE SULFATE 5 MG/ML
5 INJECTION INTRAVENOUS ONCE AS NEEDED
Status: DISCONTINUED | OUTPATIENT
Start: 2023-12-28 | End: 2023-12-29 | Stop reason: HOSPADM

## 2023-12-28 RX ORDER — NITROGLYCERIN 0.4 MG/1
0.4 TABLET SUBLINGUAL
Status: DISCONTINUED | OUTPATIENT
Start: 2023-12-28 | End: 2023-12-28

## 2023-12-28 RX ORDER — ASPIRIN 81 MG/1
81 TABLET ORAL DAILY
Status: DISCONTINUED | OUTPATIENT
Start: 2023-12-28 | End: 2023-12-29 | Stop reason: HOSPADM

## 2023-12-28 RX ORDER — PROPOFOL 10 MG/ML
VIAL (ML) INTRAVENOUS AS NEEDED
Status: DISCONTINUED | OUTPATIENT
Start: 2023-12-28 | End: 2023-12-28 | Stop reason: SURG

## 2023-12-28 RX ORDER — LABETALOL HYDROCHLORIDE 5 MG/ML
5 INJECTION, SOLUTION INTRAVENOUS
Status: DISCONTINUED | OUTPATIENT
Start: 2023-12-28 | End: 2023-12-29 | Stop reason: HOSPADM

## 2023-12-28 RX ORDER — CLOPIDOGREL BISULFATE 75 MG/1
75 TABLET ORAL DAILY
Status: DISCONTINUED | OUTPATIENT
Start: 2023-12-28 | End: 2023-12-29 | Stop reason: HOSPADM

## 2023-12-28 RX ORDER — ROSUVASTATIN CALCIUM 10 MG/1
20 TABLET, COATED ORAL NIGHTLY
Status: DISCONTINUED | OUTPATIENT
Start: 2023-12-28 | End: 2023-12-29 | Stop reason: HOSPADM

## 2023-12-28 RX ORDER — METOPROLOL SUCCINATE 25 MG/1
12.5 TABLET, EXTENDED RELEASE ORAL DAILY
Status: DISCONTINUED | OUTPATIENT
Start: 2023-12-28 | End: 2023-12-29 | Stop reason: HOSPADM

## 2023-12-28 RX ORDER — HYDRALAZINE HYDROCHLORIDE 20 MG/ML
5 INJECTION INTRAMUSCULAR; INTRAVENOUS
Status: DISCONTINUED | OUTPATIENT
Start: 2023-12-28 | End: 2023-12-29 | Stop reason: HOSPADM

## 2023-12-28 RX ORDER — PROTAMINE SULFATE 10 MG/ML
INJECTION, SOLUTION INTRAVENOUS AS NEEDED
Status: DISCONTINUED | OUTPATIENT
Start: 2023-12-28 | End: 2023-12-28 | Stop reason: SURG

## 2023-12-28 RX ORDER — SODIUM CHLORIDE 9 MG/ML
30 INJECTION, SOLUTION INTRAVENOUS CONTINUOUS
Status: DISCONTINUED | OUTPATIENT
Start: 2023-12-28 | End: 2023-12-29 | Stop reason: HOSPADM

## 2023-12-28 RX ORDER — PHENYLEPHRINE HYDROCHLORIDE 10 MG/ML
INJECTION INTRAVENOUS AS NEEDED
Status: DISCONTINUED | OUTPATIENT
Start: 2023-12-28 | End: 2023-12-28 | Stop reason: SURG

## 2023-12-28 RX ORDER — AMIODARONE HYDROCHLORIDE 200 MG/1
200 TABLET ORAL 2 TIMES DAILY
Status: DISCONTINUED | OUTPATIENT
Start: 2023-12-28 | End: 2023-12-29 | Stop reason: HOSPADM

## 2023-12-28 RX ADMIN — Medication 5000 UNITS: at 17:44

## 2023-12-28 RX ADMIN — ROSUVASTATIN 20 MG: 10 TABLET, FILM COATED ORAL at 20:10

## 2023-12-28 RX ADMIN — HEPARIN SODIUM 2000 UNITS: 1000 INJECTION, SOLUTION INTRAVENOUS; SUBCUTANEOUS at 13:43

## 2023-12-28 RX ADMIN — SACUBITRIL AND VALSARTAN 1 TABLET: 24; 26 TABLET, FILM COATED ORAL at 20:10

## 2023-12-28 RX ADMIN — PHENYLEPHRINE HYDROCHLORIDE 100 MCG: 10 INJECTION INTRAVENOUS at 13:36

## 2023-12-28 RX ADMIN — SODIUM CHLORIDE 30 ML/HR: 9 INJECTION, SOLUTION INTRAVENOUS at 10:45

## 2023-12-28 RX ADMIN — PHENYLEPHRINE HYDROCHLORIDE 0.2 MCG/KG/MIN: 10 INJECTION INTRAVENOUS at 13:36

## 2023-12-28 RX ADMIN — PROPOFOL INJECTABLE EMULSION 150 MG: 10 INJECTION, EMULSION INTRAVENOUS at 12:41

## 2023-12-28 RX ADMIN — HEPARIN SODIUM 18 UNITS/HR: 10000 INJECTION, SOLUTION INTRAVENOUS at 13:06

## 2023-12-28 RX ADMIN — PHENYLEPHRINE HYDROCHLORIDE 50 MCG: 10 INJECTION INTRAVENOUS at 12:41

## 2023-12-28 RX ADMIN — LIDOCAINE HYDROCHLORIDE 50 MG: 20 INJECTION, SOLUTION INTRAVENOUS at 12:41

## 2023-12-28 RX ADMIN — METOPROLOL SUCCINATE 12.5 MG: 25 TABLET, EXTENDED RELEASE ORAL at 17:44

## 2023-12-28 RX ADMIN — PROTAMINE SULFATE 50 MG: 10 INJECTION, SOLUTION INTRAVENOUS at 14:49

## 2023-12-28 RX ADMIN — FINASTERIDE 5 MG: 5 TABLET, FILM COATED ORAL at 17:44

## 2023-12-28 RX ADMIN — HEPARIN SODIUM 12500 UNITS: 1000 INJECTION, SOLUTION INTRAVENOUS; SUBCUTANEOUS at 13:06

## 2023-12-28 RX ADMIN — SUGAMMADEX 200 MG: 100 INJECTION, SOLUTION INTRAVENOUS at 13:02

## 2023-12-28 RX ADMIN — HEPARIN SODIUM 2000 UNITS: 1000 INJECTION, SOLUTION INTRAVENOUS; SUBCUTANEOUS at 13:26

## 2023-12-28 RX ADMIN — ROCURONIUM BROMIDE 50 MG: 10 INJECTION, SOLUTION INTRAVENOUS at 12:41

## 2023-12-28 RX ADMIN — FENTANYL CITRATE 50 MCG: 50 INJECTION, SOLUTION INTRAMUSCULAR; INTRAVENOUS at 13:25

## 2023-12-28 RX ADMIN — DEXAMETHASONE SODIUM PHOSPHATE 8 MG: 4 INJECTION, SOLUTION INTRAMUSCULAR; INTRAVENOUS at 12:45

## 2023-12-28 RX ADMIN — APIXABAN 5 MG: 5 TABLET, FILM COATED ORAL at 20:10

## 2023-12-28 RX ADMIN — PROPOFOL INJECTABLE EMULSION 135 MCG/KG/MIN: 10 INJECTION, EMULSION INTRAVENOUS at 12:43

## 2023-12-28 RX ADMIN — CLOPIDOGREL BISULFATE 75 MG: 75 TABLET ORAL at 17:44

## 2023-12-28 RX ADMIN — AMIODARONE HYDROCHLORIDE 200 MG: 200 TABLET ORAL at 20:10

## 2023-12-28 RX ADMIN — ASPIRIN 81 MG: 81 TABLET, COATED ORAL at 17:44

## 2023-12-28 RX ADMIN — SODIUM CHLORIDE: 9 INJECTION, SOLUTION INTRAVENOUS at 12:33

## 2023-12-28 RX ADMIN — FENTANYL CITRATE 50 MCG: 50 INJECTION, SOLUTION INTRAMUSCULAR; INTRAVENOUS at 12:41

## 2023-12-28 NOTE — ANESTHESIA POSTPROCEDURE EVALUATION
Patient: Dillon Aaron    Procedure Summary       Date: 12/28/23 Room / Location: [place] CATH LAB 3 / [facility] CATH INVASIVE LOCATION    Anesthesia Start: 1233 Anesthesia Stop: 1527    Procedure: Ablation atrial fibrillation, cryo Diagnosis:       Persistent atrial fibrillation      (A-fib)    Providers: Alvaro Nava MD Provider: Naif Leonard CRNA    Anesthesia Type: general ASA Status: 4            Anesthesia Type: general    Vitals  Vitals Value Taken Time   /81 12/28/23 1616   Temp 96.3 °F (35.7 °C) 12/28/23 1525   Pulse 61 12/28/23 1627   Resp 16 12/28/23 1615   SpO2 98 % 12/28/23 1627   Vitals shown include unfiled device data.        Post Anesthesia Care and Evaluation    Patient location during evaluation: PACU  Patient participation: complete - patient participated  Level of consciousness: awake  Pain scale: See nurse's notes for pain score.  Pain management: adequate    Airway patency: patent  Anesthetic complications: No anesthetic complications  PONV Status: none  Cardiovascular status: acceptable  Respiratory status: acceptable and spontaneous ventilation  Hydration status: acceptable    Comments: Patient seen and examined postoperatively; vital signs stable; SpO2 greater than or equal to 90%; cardiopulmonary status stable; nausea/vomiting adequately controlled; pain adequately controlled; no apparent anesthesia complications; patient discharged from anesthesia care when discharge criteria were met

## 2023-12-28 NOTE — PLAN OF CARE
Goal Outcome Evaluation:  Plan of Care Reviewed With: patient        Progress: improving  Outcome Evaluation: Ablation procedure complete. Groin site bleeding on arrival from cath lab, but stable now. Awaiting inpatient bed for overnight observation.      Problem: Adult Inpatient Plan of Care  Goal: Plan of Care Review  Outcome: Ongoing, Progressing  Flowsheets (Taken 12/28/2023 1556)  Progress: improving  Plan of Care Reviewed With: patient  Outcome Evaluation: Ablation procedure complete. Groin site bleeding on arrival from cath lab, but stable now. Awaiting inpatient bed for overnight observation.  Goal: Patient-Specific Goal (Individualized)  Outcome: Ongoing, Progressing  Goal: Absence of Hospital-Acquired Illness or Injury  Outcome: Ongoing, Progressing  Intervention: Identify and Manage Fall Risk  Recent Flowsheet Documentation  Taken 12/28/2023 1545 by Ayesha Thakkar RN  Safety Promotion/Fall Prevention: safety round/check completed  Taken 12/28/2023 1530 by Ayesha Thakkar RN  Safety Promotion/Fall Prevention: safety round/check completed  Taken 12/28/2023 1515 by Ayesha Thakkar RN  Safety Promotion/Fall Prevention: safety round/check completed  Intervention: Prevent Skin Injury  Recent Flowsheet Documentation  Taken 12/28/2023 1545 by Ayesha Thakkar RN  Body Position: supine  Taken 12/28/2023 1530 by Ayesha Thakkar RN  Body Position: supine  Taken 12/28/2023 1515 by Ayesha Thakkar RN  Body Position: supine  Intervention: Prevent and Manage VTE (Venous Thromboembolism) Risk  Recent Flowsheet Documentation  Taken 12/28/2023 1545 by Ayesha Thakkar RN  Activity Management: bedrest  Taken 12/28/2023 1530 by Ayesha Thakkar RN  Activity Management: bedrest  Taken 12/28/2023 1515 by Ayesha Thakkar RN  Activity Management: bedrest  Goal: Optimal Comfort and Wellbeing  Outcome: Ongoing, Progressing  Intervention: Monitor Pain and Promote Comfort  Recent Flowsheet Documentation  Taken 12/28/2023 1030 by  Ayesha Thakkar RN  Pain Management Interventions: pain management plan reviewed with patient/caregiver  Goal: Readiness for Transition of Care  Outcome: Ongoing, Progressing  Intervention: Mutually Develop Transition Plan  Recent Flowsheet Documentation  Taken 12/28/2023 1550 by Ayesha Thakkar RN  Equipment Currently Used at Home: cpap  Transportation Anticipated: family or friend will provide  Transportation Concerns: none  Patient/Family Anticipated Services at Transition: none  Patient/Family Anticipates Transition to: home with family     Problem: Arrhythmia/Dysrhythmia (Cardiac Catheterization)  Goal: Stable Heart Rate and Rhythm  Outcome: Ongoing, Progressing     Problem: Bleeding (Cardiac Catheterization)  Goal: Absence of Bleeding  Outcome: Ongoing, Progressing     Problem: Contrast-Induced Injury Risk (Cardiac Catheterization)  Goal: Absence of Contrast-Induced Injury  Outcome: Ongoing, Progressing     Problem: Embolism (Cardiac Catheterization)  Goal: Absence of Embolism Signs and Symptoms  Outcome: Ongoing, Progressing     Problem: Ongoing Anesthesia/Sedation Effects (Cardiac Catheterization)  Goal: Anesthesia/Sedation Recovery  Outcome: Ongoing, Progressing  Intervention: Optimize Anesthesia Recovery  Recent Flowsheet Documentation  Taken 12/28/2023 1545 by Ayesha Thakkar RN  Safety Promotion/Fall Prevention: safety round/check completed  Taken 12/28/2023 1530 by Ayesha Thakkar RN  Safety Promotion/Fall Prevention: safety round/check completed  Taken 12/28/2023 1515 by Ayesha Thakkar RN  Safety Promotion/Fall Prevention: safety round/check completed     Problem: Pain (Cardiac Catheterization)  Goal: Acceptable Pain Control  Outcome: Ongoing, Progressing  Intervention: Prevent or Manage Pain  Recent Flowsheet Documentation  Taken 12/28/2023 1030 by Ayesha Thakkar RN  Pain Management Interventions: pain management plan reviewed with patient/caregiver     Problem: Vascular Access Protection (Cardiac  Catheterization)  Goal: Absence of Vascular Access Complication  Outcome: Ongoing, Progressing  Intervention: Prevent Access Site Complications  Recent Flowsheet Documentation  Taken 12/28/2023 1545 by Ayesha Thakkar, RN  Activity Management: bedrest  Head of Bed (HOB) Positioning: HOB flat  Taken 12/28/2023 1530 by Ayesha Thakkar, RN  Activity Management: bedrest  Head of Bed (HOB) Positioning: HOB flat  Taken 12/28/2023 1515 by Ayesha Thakkar, RN  Activity Management: bedrest  Head of Bed (HOB) Positioning: HOB flat

## 2023-12-28 NOTE — DISCHARGE INSTRUCTIONS
Post Ablation Instructions  Drink plenty of fluids for the next 24 hours.  This helps to eliminate the dye used in your procedure through urination.  You may resume a normal diet; however, try to avoid foods that would cause gas or constipation.    Sedative medication given to you during your catheterization may decrease your judgement and reaction time for up to 24-48 hours.  Therefore:  DO NOT drive or operate hazardous machinery (48 hours)  DO NOT consume alcoholic beverages  DO NOT make any important/legal decisions  Have someone stay with you for at least 24 hours    To allow proper healing and prevent bleeding, the following activities are to be strictly avoided for the next 24-48 hours:  Excessive bending at wound site  Straining (anything that would tense up muscles around the affected puncture site)  Lifting objects greater than 5 pounds, pushing, or pulling for 5 days  For Groin Cases:  Refrain from sexual activity  Refrain from running or vigorous walking  No prolonged sitting or standing  Limit stair climbing as much as possible    Keep the puncture site clean and dry.  You may remove the dressing tomorrow and replace it with a band-aid for at least one additional day.  Gently clean the site with mild soap and water.  No scrubbing/rubbing and lightly pat the area dry.  Showers are acceptable; however, avoid submerging in water (tub baths, hot tubs, swimming pools, dishwater, etc…) for at least one week.  The site should be completely healed before resuming these activities to reduce the risk of infection.  Check the site often.  Watch for signs and symptoms of infection and notify your physician if any of the following occur:  Bleeding or an increase in swelling at the puncture site  Fever  Increased soreness around puncture site  Foul odor or significant drainage from the puncture site  Swelling, redness, or warmth at the puncture site  **A bruise or small “pea sized” lump under the skin at the puncture  site is not unusual.  This should disappear within 3-4 weeks.**    CONTACT YOUR PHYSICIAN OR CALL 911 IF YOU EXPERIENCE ANY OF THE FOLLOWING:  Increased angina (chest pain) or frequent sensations of pressure, burning, pain, or other discomfort in the chest, arm, jaws, or stomach  Lightheadedness, dizziness, faint feeling, sweating, or difficulty breathing  Odd sensation changes like numbness, tingling, coldness, or pain in the arm or leg in which the catheter was inserted  Limb in which the catheter was inserted becomes pale/bluish in color    IMPORTANT:  Although this occurs very rarely, if you should develop bright red or excessive bleeding, feel a “pop” inside at the insertion site, or notice a sudden increase in swelling larger than a walnut, you should call 911.  Hold continuous firm pressure to the access site until emergency personnel arrive.  It is best if someone else can do this for you.

## 2023-12-28 NOTE — ANESTHESIA PREPROCEDURE EVALUATION
Anesthesia Evaluation     Patient summary reviewed and Nursing notes reviewed   no history of anesthetic complications:   NPO Solid Status: > 8 hours  NPO Liquid Status: > 8 hours           Airway   Mallampati: II  TM distance: >3 FB  Neck ROM: full  Dental - normal exam     Pulmonary - normal exam   (+) ,sleep apnea  (-) not a smoker  Cardiovascular - normal exam    ECG reviewed    (+) hypertension, CAD, cardiac stents (12/1/2023) Drug eluting stent within the past 12 months , dysrhythmias Atrial Fib, hyperlipidemia  (-) angina      Neuro/Psych  (-) CVA  GI/Hepatic/Renal/Endo    (+) obesity    Musculoskeletal (-) negative ROS    Abdominal    Substance History   (+) alcohol use     OB/GYN          Other        ROS/Med Hx Other: PCI 12/1/2023  Ef 15%-20% per TTE while in Afib, plan for CV                Anesthesia Plan    ASA 4     general   total IV anesthesia  intravenous induction     Anesthetic plan, risks, benefits, and alternatives have been provided, discussed and informed consent has been obtained with: patient.    Plan discussed with CRNA and CAA.    CODE STATUS:

## 2023-12-28 NOTE — H&P
"Dillon Aaron is doing well today     ROS     Patient has no known allergies.     Scheduled Meds:amiodarone, 400 mg, Oral, Q12H  apixaban, 5 mg, Oral, Q12H  aspirin, 81 mg, Oral, Daily  clopidogrel, 75 mg, Oral, Daily  digoxin, 125 mcg, Oral, Daily  finasteride, 5 mg, Oral, Daily  meloxicam, 15 mg, Oral, Daily  rosuvastatin, 20 mg, Oral, Nightly  sacubitril-valsartan, 1 tablet, Oral, Q12H        Continuous Infusions:   PRN Meds:.  acetaminophen    nitroglycerin        VITAL SIGNS  Vitals          Vitals:     12/03/23 2100 12/04/23 0100 12/04/23 0500 12/04/23 0932   BP: 116/80 113/83 129/85 122/84   BP Location: Left arm Left arm Left arm Right arm   Patient Position: Lying Lying Lying Lying   Pulse: 83 102 97 110   Resp: 16 18 18 18   Temp: 98.1 °F (36.7 °C) 97.9 °F (36.6 °C) 97.5 °F (36.4 °C) 98.1 °F (36.7 °C)   TempSrc: Oral Oral Oral Oral   SpO2:           Weight:           Height:                    Flowsheet Rows       Flowsheet Row First Filed Value   Admission Height 177.8 cm (70\") Documented at 12/01/2023 1055   Admission Weight 114 kg (251 lb 1.7 oz) Documented at 12/01/2023 1055                    Physical Exam  VITALS REVIEWED     General:      well developed, in no acute distress.    Head:      normocephalic and atraumatic.    Eyes:      PERRL/EOM intact, conjunctiva and sclera clear with out nystagmus.    Neck:      no masses, thyromegaly,  trachea central with normal respiratory effort and PMI displaced laterally  Lungs:      Clear  Heart:       Regular rate and rhythm  Msk:      no deformity or scoliosis noted of thoracic or lumbar spine.    Pulses:      pulses normal in all 4 extremities.    Extremities:       No lower extremity edema  Neurologic:      no focal deficits.   alert oriented x3  Skin:      intact without lesions or rashes.    Psych:      alert and cooperative; normal mood and affect; normal attention span and concentration.             LAB RESULTS (LAST 7 DAYS)     CBC        Results from " last 7 days   Lab Units 12/02/23 0137 12/01/23 0918   WBC 10*3/mm3 6.30 6.80   RBC 10*6/mm3 4.33 4.67   HEMOGLOBIN g/dL 13.6 14.9   HEMATOCRIT % 40.7 43.7   MCV fL 94.0 93.6   PLATELETS 10*3/mm3 168 197         BMP        Results from last 7 days   Lab Units 12/02/23 0137 12/01/23 0918   SODIUM mmol/L 140 141   POTASSIUM mmol/L 4.5 5.1   CHLORIDE mmol/L 105 105   CO2 mmol/L 25.0 29.0   BUN mg/dL 17 21   CREATININE mg/dL 1.17 1.18   GLUCOSE mg/dL 110* 112*         CMP         Results from last 7 days   Lab Units 12/02/23 0137 12/01/23 0918   SODIUM mmol/L 140 141   POTASSIUM mmol/L 4.5 5.1   CHLORIDE mmol/L 105 105   CO2 mmol/L 25.0 29.0   BUN mg/dL 17 21   CREATININE mg/dL 1.17 1.18   GLUCOSE mg/dL 110* 112*   ALBUMIN g/dL  --  4.5   BILIRUBIN mg/dL  --  1.2   ALK PHOS U/L  --  63   AST (SGOT) U/L  --  16   ALT (SGPT) U/L  --  28            BNP         TROPONIN         CoAg       Results from last 7 days   Lab Units 12/01/23 0918   INR   1.07         Creatinine Clearance  Estimated Creatinine Clearance: 76.8 mL/min (by C-G formula based on SCr of 1.17 mg/dL).     ABG            EKG     I personally reviewed the patient's EKG/Telemetry data: Sinus rhythm with left bundle branch block              Assessment & Plan    Chest pain, atypical    Left bundle branch block    Paroxysmal atrial fibrillation    Abnormal stress test    CAD (coronary artery disease)    Ischemic cardiomyopathy        Dillon Aaron is a 67-year-old male patient who has history of paroxysmal atrial fibrillation, who was in sinus rhythm in December 2022, presented to the hospital essentially for CHF but also found to have A-fib with rapid ventricular rates, he has chronic left bundle branch block.  His ejection fraction was very low and heart catheterization showed severe LAD lesion for which she received stent.  He remains in A-fib with rapid ventricular rates.  He had been on Xarelto for about a month.  I believe that his cardiomyopathy is  multifactorial, ischemic, nonischemic from A-fib with rapid ventricular rates but also dyssynchrony from left bundle branch block.     So for now we will focus on rhythm restoration, since he is symptomatic, we will go ahead and proceed with BLANCHE guided cardioversion on Monday.        Patient underwent BLANCHE guided cardioversion on 12/4/2023, currently in sinus rhythm with left bundle branch block.  Continue amiodarone and Eliquis on discharge.  He will be scheduled for outpatient ablation as discussed in consult note.  Also possible BiV ICD in the future.     I discussed the patients findings and my recommendations with patient and agrees with outlined plan.     Alvaro Nava MD  12/04/23  15:45 EST

## 2023-12-28 NOTE — Clinical Note
EP Catheter removed.  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 · Discuss treatment options with ID MD today  · Will not add rifampin sec to interaction with breast ca meds/BP meds  · Will need to ask for nephro clearance to place PICC: will make contact today  · Anticipate 6 weeks of treatment, followed by lifelong suppression given retained HW; CM to assist with outpatient resources, wants to DC home    Anti-infectives:   1. Vancomycin -present    Subjective:   Working with PT this am, sitting at the side of the bed. Denies fever, chills, nausea or diarrhea. ID treatment plan reviewed. Pt discussed with CM/ID MD.    Spent 45 min seeing patient today, > 50%  was spent in counseling, coordination of care, educated pt on Infection, ID treatment options and medication side effects/directions. Allergies   Allergen Reactions    Penicillins Rash        Review of Systems:  A comprehensive review of systems was negative except for that written in the History of Present Illness. Objective:   Blood pressure 146/82, pulse 73, temperature 99.2 °F (37.3 °C), resp. rate 20, height 5' 7\" (1.702 m), weight 109.8 kg (242 lb), SpO2 95 %.   Temp (24hrs), Av.1 °F (36.7 °C), Min:97.5 °F (36.4 °C), Max:99.2 °F (37.3 °C)     General:  Alert, cooperative, no acute distress, appears stated age, obese   Head:  Normocephalic, atraumatic    Eyes:  Anicteric, no drainage, not injected, EOMI   Throat: Mucus membranes moist OP clear   Neck: Supple, symmetrical, trachea midline, no JVD   Lungs:   Clear throughout lung fields without increased work of breathing or audible wheezes   Heart:  Regular rate and rhythm, without audible murmur, rub, or gallop   Abdomen:   Soft, non-tender, no guarding, no distention, bowel sounds active   Extremities: Extremities normal, atraumatic, no cyanosis, left hip edema, wound vac intact   Pulses: 2+ and symmetric   Skin: Skin color, texture, turgor normal, no rashes or lesions. Lines/Devices: R arm PIV intact         Data Review:   Recent Results (from the past 24 hour(s))   VANCOMYCIN, TROUGH    Collection Time: 05/17/20  1:27 PM   Result Value Ref Range    Vancomycin,trough 32.1 (HH) 5 - 20 ug/mL   CBC WITH AUTOMATED DIFF    Collection Time: 05/18/20  5:18 AM   Result Value Ref Range    WBC 3.5 (L) 4.3 - 11.1 K/uL    RBC 2.41 (L) 4.05 - 5.2 M/uL    HGB 7.6 (L) 11.7 - 15.4 g/dL    HCT 25.4 (L) 35.8 - 46.3 %    .4 (H) 79.6 - 97.8 FL    MCH 31.5 26.1 - 32.9 PG    MCHC 29.9 (L) 31.4 - 35.0 g/dL    RDW 18.2 (H) 11.9 - 14.6 %    PLATELET 977 332 - 716 K/uL    MPV 10.9 9.4 - 12.3 FL    ABSOLUTE NRBC 0.02 0.0 - 0.2 K/uL    NEUTROPHILS 61 43 - 78 %    LYMPHOCYTES 15 13 - 44 %    MONOCYTES 14 (H) 4.0 - 12.0 %    EOSINOPHILS 2 0.5 - 7.8 %    BASOPHILS 1 0.0 - 2.0 %    IMMATURE GRANULOCYTES 7 (H) 0.0 - 5.0 %    ABS. NEUTROPHILS 2.2 1.7 - 8.2 K/UL    ABS. LYMPHOCYTES 0.5 0.5 - 4.6 K/UL    ABS. MONOCYTES 0.5 0.1 - 1.3 K/UL    ABS. EOSINOPHILS 0.1 0.0 - 0.8 K/UL    ABS. BASOPHILS 0.0 0.0 - 0.2 K/UL    ABS. IMM.  GRANS. 0.2 0.0 - 0.5 K/UL    RBC COMMENTS SLIGHT  ANISOCYTOSIS + POIKILOCYTOSIS        PLATELET COMMENTS ADEQUATE      DF AUTOMATED     METABOLIC PANEL, COMPREHENSIVE    Collection Time: 05/18/20  5:18 AM   Result Value Ref Range    Sodium 141 136 - 145 mmol/L    Potassium 4.7 3.5 - 5.1 mmol/L    Chloride 109 (H) 98 - 107 mmol/L    CO2 23 21 - 32 mmol/L    Anion gap 9 7 - 16 mmol/L    Glucose 81 65 - 100 mg/dL    BUN 38 (H) 8 - 23 MG/DL    Creatinine 2.17 (H) 0.6 - 1.0 MG/DL    GFR est AA 28 (L) >60 ml/min/1.73m2    GFR est non-AA 23 (L) >60 ml/min/1.73m2    Calcium 8.5 8.3 - 10.4 MG/DL    Bilirubin, total 0.6 0.2 - 1.1 MG/DL    ALT (SGPT) 24 12 - 65 U/L    AST (SGOT) 47 (H) 15 - 37 U/L    Alk. phosphatase 181 (H) 50 - 136 U/L    Protein, total 4.9 (L) 6.3 - 8.2 g/dL    Albumin 1.9 (L) 3.2 - 4.6 g/dL    Globulin 3.0 2.3 - 3.5 g/dL    A-G Ratio 0.6 (L) 1.2 - 3.5     BILIRUBIN, DIRECT    Collection Time: 05/18/20  5:18 AM   Result Value Ref Range    Bilirubin, direct 0.3 <0.4 MG/DL        Microbiology:    All Micro Results     Procedure Component Value Units Date/Time    CULTURE, ANAEROBIC [[id number]] Collected:  05/15/20 0904    Order Status:  Completed Specimen:  Hip Updated:  05/18/20 0829     Special Requests: LEFT        Culture result:       SUBCULTURE IS NECESSARY TO DETERMINE PRESENCE OR ABSENCE OF ANAEROBIC BACTERIA IN THIS CULTURE. FURTHER REPORT TO FOLLOW AFTER INCUBATION OF SUBCULTURE. CULTURE, ANAEROBIC [[id number]] Collected:  05/15/20 0903    Order Status:  Completed Specimen:  Hip Updated:  05/18/20 0829     Special Requests: LEFT        Culture result:       SUBCULTURE IS NECESSARY TO DETERMINE PRESENCE OR ABSENCE OF ANAEROBIC BACTERIA IN THIS CULTURE. FURTHER REPORT TO FOLLOW AFTER INCUBATION OF SUBCULTURE. CULTURE, ANAEROBIC [[id number]] Collected:  05/15/20 0910    Order Status:  Completed Specimen:  Hip Updated:  05/18/20 0828     Special Requests: NO SPECIAL REQUESTS        Culture result:       SUBCULTURE IS NECESSARY TO DETERMINE PRESENCE OR ABSENCE OF ANAEROBIC BACTERIA IN THIS CULTURE. FURTHER REPORT TO FOLLOW AFTER INCUBATION OF SUBCULTURE.           CULTURE, TISSUE Pamella Bahai STAIN [[id number]]  (Abnormal) Collected:  05/15/20 0910    Order Status:  Completed Specimen:  Hip Updated:  05/18/20 0737     Special Requests: NO SPECIAL REQUESTS        GRAM STAIN 0 TO 1 WBCS/OIF      FEW GRAM POSITIVE COCCI        Culture result:       MODERATE STAPHYLOCOCCUS AUREUS                  For Susceptibility Refer to Culture  [id number]      CULTURE, TISSUE Arch Pickles STAIN [[id number]]  (Abnormal)  (Susceptibility) Collected:  05/15/20 0903    Order Status:  Completed Specimen:  Hip Updated:  05/18/20 0734     Special Requests: NO SPECIAL REQUESTS        GRAM STAIN 0 TO 1 WBCS/OIF      FEW GRAM POSITIVE COCCI        Culture result:       HEAVY STAPHYLOCOCCUS AUREUS          CULTURE, BODY FLUID W Lonn Bride [[id number]]  (Abnormal)  (Susceptibility) Collected:  05/15/20 0904    Order Status:  Completed Specimen:  Hip Updated:  05/18/20 0732     Special Requests: LEFT        GRAM STAIN 50  WBCS/OIF            MODERATE GRAM POSITIVE COCCI           Culture result:       HEAVY STAPHYLOCOCCUS AUREUS          CULTURE, BLOOD [[id number]] Collected:  05/14/20 1926    Order Status:  Completed Specimen:  Blood Updated:  05/18/20 0710     Special Requests: --        NO SPECIAL REQUESTS  LEFT  HAND       Culture result: NO GROWTH 4 DAYS       CULTURE, BLOOD [[id number]] Collected:  05/14/20 1926    Order Status:  Completed Specimen:  Blood Updated:  05/18/20 0710     Special Requests: --        LEFT  ARM       Culture result: NO GROWTH 4 DAYS       CULTURE, BLOOD [[id number]] Collected:  05/13/20 1722    Order Status:  Completed Specimen:  Blood Updated:  05/18/20 0709     Special Requests: --        LEFT  Antecubital       Culture result: NO GROWTH 5 DAYS       CULTURE, WOUND Arch Pickles STAIN [[id number]]  (Abnormal)  (Susceptibility) Collected:  05/13/20 1723    Order Status:  Completed Specimen:  Wound Drainage Updated:  05/17/20 0727     Special Requests: SWAB L HIP WOUND     GRAM STAIN 0 TO 1 WBCS SEEN PER OIF            MODERATE GRAM POSITIVE COCCI           Culture result:       HEAVY STAPHYLOCOCCUS AUREUS                  HEAVY ENTEROCOCCUS FAECALIS GROUP D LIGHT  NORMAL SKIN HOMA ISOLATED       CULTURE, BLOOD [[id number]]  (Abnormal) Collected:  05/13/20 2040    Order Status:  Completed Specimen:  Blood Updated:  05/16/20 0642     Special Requests: --        NO SPECIAL REQUESTS  LEFT  HAND       GRAM STAIN AEROBIC BOTTLE POSITIVE         GRAM POS COCCI IN CHAINS               RESULTS VERIFIED, PHONED TO AND READ BACK BY MERLENE QUEZADA RN @3397 5.14.2020 EOR           Culture result:       ALPHA STREPTOCOCCUS, NOT S. PNEUMONIAE            REFER TO BIOFIRE ACC [id number]            THIS ORGANISM MAY BE INDICATIVE OF CULTURE CONTAMINATION, HOWEVER, CLINICAL CORRELATION NEEDS TO BE EVALUATED, AS EACH CASE IS UNIQUE. CULTURE, Danny Colemans STAIN [[id number]] Collected:  05/15/20 0900    Order Status:  Canceled Specimen:  Wound from Hip     BLOOD CULTURE ID PANEL [[id number]]  (Abnormal) Collected:  05/13/20 2040    Order Status:  Completed Specimen:  Blood Updated:  05/15/20 0756     Acc. no. from Micro Order [id number]     Streptococcus Detected        Comment: RESULTS VERIFIED, PHONED TO AND READ BACK BY  Esdras Dale RN @[id number] 5/15/20 HF          INTERPRETATION       Gram positive cocci. Identified by realtime PCR as Streptococcus species (not agalactiae, pyogenes, or pneumoniae). Comment: Consider discontinuation of IV vancomycin and using an anti-streptococcal beta-lactam (ceftriaxone/cefotaxime (peds))             Studies:    5/15 XR LT  Hip  IMPRESSION:   1. Partial left femoral hardware removal with a redemonstrated subtrochanteric  subacute appearing fracture of the left femur with a left femoral  cephalomedullary jeri.     Signed By: Mya Miguel NP     May 18, 2020

## 2023-12-28 NOTE — Clinical Note
Gurjit Hernadez MD    COLONOSCOPY INSTRUCTIONS - MORNING PROCEDURE    NAME: [facility]      HOSPITAL: [facility]    DATE: 6/9/17 FRIDAY    CHECK IN:  SURGERY CENTER                         ARRIVAL TIME: 11:30 am                                                         A sedative will be given to you for the exam.  A responsible adult 18 years of age or older must accompany you from the hospital the day of your exam.  You may not leave by yourself or drive yourself home.  You may not drive for the rest of the day or return to work.    · IF YOU DO NOT HAVE A  THAT IS A RESPONSIBLE ADULT THAT IS 18 YEARS OF AGE OR OLDER, YOUR APPOINTMENT WILL BE CANCELLED.  · You may take Tylenol  (acetaminophen). Do not take aspirin day of procedure.  · If you are diabetic, see special instructions handout.  · If you take blood thinners (Coumadin, Warfarin, Plavix), see special instructions handout.  · YOU NEED TO CALL YOUR INSURANCE COMPANY TO VERIFY COVERAGE FOR YOUR PROCEDURE.  · MEDICARE AND STATE INSURANCE DO NOT NEED TO VERIFY COVERAGE FOR YOUR PROCEDURE.    WHAT YOU NEED TO DO: Please pick-up a Nulytely Prep Kit at the pharmacy your physician [facility] [place] [facility] on [address] 6/2/17    DAY BEFORE EXAMINATION:6/8/17 THURSDAY  • Mix PREP according to instructions and refrigerate.  • Do not eat any solid food all day.    • You may drink clear liquids, such as soda, clear fruit juice, coffee or tea without milk products, beef or chicken broth, Popsicles,Gatore Radha, Propell, Jell-O, Hard candy.  Avoid anything with RED coloring.  We encourage you to drink a lot of fluids for couple of days prior to procedure.  • Begin drinking the solution at 3:00 p.m ( No later than 6:00pm).  Drink an 8-ounce glass every 15-20 minutes.  It is best to drink the whole glass rapidly rather than sipping small amounts. May use a straw.  • Continue drinking until the bottle is empty.  It usually takes 3 to 5 hours to  EP Catheter removed.  completely drink the bottle, so give yourself plenty of time.  • You may drink clear liquids or suck on hard candy or Popsicles while drinking the Prep.  • Some people feel full or bloated after about 90 minutes of drinking the Prep.  This disappears with time, especially when you start passing stool.  If you feel bloated, stop drinking for about 30- 45 minutes and see if you can pass some stool.  The problem should resolve and you should be able to start drinking again.  If you still have problems, call us for instructions.    DAY OF EXAMINATION:  • Do not eat or drink anything after midnight the night before your exam.  • Take your regular blood pressure and heart medications on the morning of the test with a sip of water.       IF YOU HAVE ANY QUESTIONS REGARDING THESE INSTRUCTIONS PLEASE CALL  [phone]

## 2023-12-28 NOTE — ANESTHESIA PROCEDURE NOTES
Airway  Urgency: elective    Date/Time: 12/28/2023 12:43 PM    General Information and Staff    Patient location during procedure: OR  CRNA/CAA: Naif Leonard CRNA    Indications and Patient Condition  Indications for airway management: airway protection    Preoxygenated: yes  MILS maintained throughout  Mask difficulty assessment: 1 - vent by mask    Final Airway Details  Final airway type: endotracheal airway      Successful airway: ETT     Successful intubation technique: direct laryngoscopy  Endotracheal tube insertion site: oral  Blade: Johnnie  Blade size: 3  ETT size (mm): 7.5  Cormack-Lehane Classification: grade I - full view of glottis  Placement verified by: chest auscultation and capnometry   Measured from: teeth  ETT/EBT  to teeth (cm): 22  Number of attempts at approach: 1  Assessment: lips, teeth, and gum same as pre-op and atraumatic intubation

## 2023-12-29 VITALS
WEIGHT: 244.27 LBS | HEIGHT: 69 IN | SYSTOLIC BLOOD PRESSURE: 112 MMHG | BODY MASS INDEX: 36.18 KG/M2 | OXYGEN SATURATION: 94 % | DIASTOLIC BLOOD PRESSURE: 66 MMHG | HEART RATE: 59 BPM | TEMPERATURE: 98.3 F | RESPIRATION RATE: 22 BRPM

## 2023-12-29 PROCEDURE — 99239 HOSP IP/OBS DSCHRG MGMT >30: CPT | Performed by: INTERNAL MEDICINE

## 2023-12-29 RX ADMIN — METOPROLOL SUCCINATE 12.5 MG: 25 TABLET, EXTENDED RELEASE ORAL at 08:28

## 2023-12-29 RX ADMIN — AMIODARONE HYDROCHLORIDE 200 MG: 200 TABLET ORAL at 08:27

## 2023-12-29 RX ADMIN — SACUBITRIL AND VALSARTAN 1 TABLET: 24; 26 TABLET, FILM COATED ORAL at 08:27

## 2023-12-29 RX ADMIN — CLOPIDOGREL BISULFATE 75 MG: 75 TABLET ORAL at 08:27

## 2023-12-29 RX ADMIN — APIXABAN 5 MG: 5 TABLET, FILM COATED ORAL at 08:28

## 2023-12-29 RX ADMIN — Medication 5000 UNITS: at 08:27

## 2023-12-29 RX ADMIN — ASPIRIN 81 MG: 81 TABLET, COATED ORAL at 08:28

## 2023-12-29 RX ADMIN — FINASTERIDE 5 MG: 5 TABLET, FILM COATED ORAL at 08:28

## 2023-12-29 NOTE — PLAN OF CARE
Goal Outcome Evaluation:       Patient has been resting quietly throughout the night. Patient had heart cath done yesterday. No complication noted. Possible discharge today

## 2023-12-29 NOTE — DISCHARGE SUMMARY
[facility] [place]    Date of Admission: 12/28/2023    Date of Discharge:  12/29/2023    Length of stay:  LOS: 0 days     Admission Diagnosis: A-fib, combined ischemic and nonischemic cardiomyopathy, left bundle branch block    Discharge Diagnosis: Same, status post A-fib/flutter ablation    Presenting Problem/History of Present Illness  Active Hospital Problems    Diagnosis  POA    **Persistent atrial fibrillation [I48.19]  Unknown    A-fib [I48.91]  Yes      Resolved Hospital Problems   No resolved problems to display.          Hospital Course  Dillon Aaron is a 67 y.o. male presented for elective A-fib and flutter ablation, the procedure was done on 12/28/2023, no procedural complications.  Patient was seen and examined this morning, no bleeding or hematoma at vascular access site.  Remained in sinus rhythm overnight.  He will be discharged home in a stable condition.  All his medications were continued except the aspirin, he will remain on Eliquis and Plavix, per conversation with Dr. Ortiz.  We will schedule an office visit in 4 to 6 weeks.  Patient will need to have an echocardiogram with Dr. Ortiz in the coming months, if EF does not improve with revascularization and optimal medical therapy for CHF and cardiomyopathy, then he will need a BiV ICD since he has a left bundle branch block.      The discharge process took about 35 minutes during which we discussed about medication changes, activity restrictions, follow-up appointments.  The patient voiced understanding and all his  questions were answered to his  satisfaction.    Past Medical History:   Diagnosis Date    Atrial fibrillation     Hyperlipidemia     Hypertension     MONICA (obstructive sleep apnea)     wears cpap    Rectal cancer     in remission     Past Surgical History:   Procedure Laterality Date    CARDIAC CATHETERIZATION N/A 04/22/2022    Procedure: Left Heart Cath;  Surgeon: Truong Ortiz MD;  Location: [facility] CATH INVASIVE LOCATION;  Service:  "Cardiology;  Laterality: N/A;    CARDIAC CATHETERIZATION N/A 12/01/2023    Procedure: Left Heart Cath;  Surgeon: Truong Ortiz MD;  Location: [facility] CATH INVASIVE LOCATION;  Service: Cardiology;  Laterality: N/A;    CORONARY ANGIOPLASTY WITH STENT PLACEMENT  12/01/2023    Left heart cath with angioplasty and kissing stents to LAD and diagonal by Dr Ortiz    SHOULDER SURGERY Left     fell off ladder, helen placed to fix shoulder/arm    TOTAL HIP ARTHROPLASTY Bilateral 2022     Social History     Socioeconomic History    Marital status:    Tobacco Use    Smoking status: Never    Smokeless tobacco: Never   Vaping Use    Vaping Use: Never used   Substance and Sexual Activity    Alcohol use: Not Currently     Comment: occasionally; \"a case of beer a year\"    Drug use: Never    Sexual activity: Defer       Procedures Performed: A-fib and flutter ablation, see report for details.       Pertinent Test Results:     Lab Results (last 72 hours)       Procedure Component Value Units Date/Time    POC Activated Clotting Time [[id number]]  (Abnormal) Collected: 12/28/23 1416    Specimen: Blood Updated: 12/28/23 1722     Activated Clotting Time  320 Seconds      Comment: Serial Number: [id number]:  [id number]       POC Activated Clotting Time [[id number]]  (Abnormal) Collected: 12/28/23 1353    Specimen: Blood Updated: 12/28/23 1400     Activated Clotting Time  363 Seconds      Comment: Serial Number: [id number]:  [id number]       POC Activated Clotting Time [[id number]]  (Abnormal) Collected: 12/28/23 1336    Specimen: Venous Blood Updated: 12/28/23 1400     Activated Clotting Time  320 Seconds      Comment: Serial Number: [id number]:  [id number]       POC Activated Clotting Time [[id number]]  (Abnormal) Collected: 12/28/23 1320    Specimen: Venous Blood Updated: 12/28/23 1400     Activated Clotting Time  331 Seconds      Comment: Serial Number: [id number]:  [id number]               Condition on Discharge: " "Stable    Vital Signs  /66 (BP Location: Left arm, Patient Position: Lying)   Pulse 59   Temp 98.3 °F (36.8 °C) (Oral)   Resp 22   Ht 174 cm (68.5\")   Wt 111 kg (244 lb 4.3 oz)   SpO2 94%   BMI 36.60 kg/m²     Physical Exam    Physical Exam  VITALS REVIEWED    General:      well developed, in no acute distress.    Head:      normocephalic and atraumatic.    Eyes:      PERRL/EOM intact, conjunctiva and sclera clear with out nystagmus.    Neck:      no masses, thyromegaly,  trachea central with normal respiratory effort and PMI displaced laterally  Lungs:      Clear  Heart:       Regular rate and rhythm  Msk:      no deformity or scoliosis noted of thoracic or lumbar spine.    Pulses:      pulses normal in all 4 extremities.    Extremities:       No lower extremity edema, no bleeding or hematoma at vascular access site  Neurologic:      no focal deficits.   alert oriented x3  Skin:      intact without lesions or rashes.    Psych:      alert and cooperative; normal mood and affect; normal attention span and concentration.      Discharge Disposition  Home or Self Care    Discharge Medications     Discharge Medications        Continue These Medications        Instructions Start Date   amiodarone 200 MG tablet  Commonly known as: PACERONE   200 mg, Oral, 2 Times Daily      apixaban 5 MG tablet tablet  Commonly known as: ELIQUIS   5 mg, Oral, Every 12 Hours Scheduled      clopidogrel 75 MG tablet  Commonly known as: PLAVIX   75 mg, Oral, Daily      Entresto 24-26 MG tablet  Generic drug: sacubitril-valsartan   1 tablet, Oral, Every 12 Hours Scheduled      finasteride 5 MG tablet  Commonly known as: PROSCAR   5 mg, Oral, Daily      metoprolol succinate XL 25 MG 24 hr tablet  Commonly known as: TOPROL-XL   12.5 mg, Oral, Daily      nitroglycerin 0.4 MG SL tablet  Commonly known as: NITROSTAT   0.4 mg, Sublingual, Every 5 Minutes PRN, Take no more than 3 doses in 15 minutes.      rosuvastatin 20 MG " tablet  Commonly known as: CRESTOR   20 mg, Oral, Nightly      vitamin D3 125 MCG (5000 UT) capsule capsule   5,000 Units, Oral, Daily             Stop These Medications      aspirin 81 MG EC tablet              Discharge Diet: Cardiac    Activity at Discharge: No heavy lifting for 3 to 5 days.    Follow-up Appointments  Future Appointments   Date Time Provider Department Center   1/5/2024  8:30 AM PHASE II - CR INITIAL ASSESS [facility] None   3/6/2024  1:30 PM Truong Ortiz MD [facility]       Risk for Readmission (LACE) Score: 2 (12/29/2023  6:00 AM)      Alvaro Nava MD  12/29/23  08:40 EST

## 2024-01-02 ENCOUNTER — TELEPHONE (OUTPATIENT)
Dept: CARDIOLOGY | Facility: CLINIC | Age: 69
End: 2024-01-02
Payer: MEDICARE

## 2024-01-02 RX ORDER — AMIODARONE HYDROCHLORIDE 200 MG/1
200 TABLET ORAL 2 TIMES DAILY
Qty: 180 TABLET | Refills: 1 | Status: SHIPPED | OUTPATIENT
Start: 2024-01-02

## 2024-01-02 RX ORDER — ROSUVASTATIN CALCIUM 20 MG/1
20 TABLET, COATED ORAL NIGHTLY
Qty: 90 TABLET | Refills: 1 | Status: SHIPPED | OUTPATIENT
Start: 2024-01-02

## 2024-01-02 NOTE — TELEPHONE ENCOUNTER
Caller: Dillon Aaron    Relationship: Self    Best call back number: [phone]     Requested Prescriptions:   Requested Prescriptions     Pending Prescriptions Disp Refills    amiodarone (PACERONE) 200 MG tablet 60 tablet 0     Sig: Take 1 tablet by mouth 2 (Two) Times a Day for 30 days.    rosuvastatin (CRESTOR) 20 MG tablet 90 tablet      Sig: Take 1 tablet by mouth Every Night.    apixaban (ELIQUIS) 5 MG tablet tablet 60 tablet 3     Sig: Take 1 tablet by mouth Every 12 (Twelve) Hours for 120 days. Indications: Atrial Fibrillation        Pharmacy where request should be sent: [facility]     Last office visit with prescribing clinician: 11/30/2023   Last telemedicine visit with prescribing clinician: Visit date not found   Next office visit with prescribing clinician: 3/6/2024     Additional details provided by patient: PT STATES HE IS OUT OF HIS ELIQUIS BUT HE HAS ENOUGH OF THE OTHER TWO UNTIL END OF WEEK - PT IS WANTING TO SWITCH TO [facility] - PT REQUESTING 30 DAY REFILLS     Does the patient have less than a 3 day supply:  [x] Yes  [] No    Would you like a call back once the refill request has been completed: [x] Yes [] No    Nubia Figueroa Rep   01/02/24 09:48 EST

## 2024-01-05 ENCOUNTER — APPOINTMENT (OUTPATIENT)
Dept: CARDIAC REHAB | Facility: HOSPITAL | Age: 69
End: 2024-01-05
Payer: MEDICARE

## 2024-01-08 ENCOUNTER — TREATMENT (OUTPATIENT)
Dept: CARDIAC REHAB | Facility: HOSPITAL | Age: 69
End: 2024-01-08
Payer: MEDICARE

## 2024-01-08 DIAGNOSIS — Z95.5 STENTED CORONARY ARTERY: Primary | ICD-10-CM

## 2024-01-08 PROCEDURE — 93798 PHYS/QHP OP CAR RHAB W/ECG: CPT

## 2024-01-10 ENCOUNTER — TREATMENT (OUTPATIENT)
Dept: CARDIAC REHAB | Facility: HOSPITAL | Age: 69
End: 2024-01-10
Payer: MEDICARE

## 2024-01-10 DIAGNOSIS — Z95.5 STENTED CORONARY ARTERY: Primary | ICD-10-CM

## 2024-01-10 PROCEDURE — 93798 PHYS/QHP OP CAR RHAB W/ECG: CPT

## 2024-01-12 ENCOUNTER — TREATMENT (OUTPATIENT)
Dept: CARDIAC REHAB | Facility: HOSPITAL | Age: 69
End: 2024-01-12
Payer: MEDICARE

## 2024-01-12 DIAGNOSIS — Z95.5 STENTED CORONARY ARTERY: Primary | ICD-10-CM

## 2024-01-12 PROCEDURE — 93798 PHYS/QHP OP CAR RHAB W/ECG: CPT

## 2024-01-15 ENCOUNTER — TREATMENT (OUTPATIENT)
Dept: CARDIAC REHAB | Facility: HOSPITAL | Age: 69
End: 2024-01-15
Payer: MEDICARE

## 2024-01-15 DIAGNOSIS — Z95.5 STENTED CORONARY ARTERY: Primary | ICD-10-CM

## 2024-01-15 PROCEDURE — 93798 PHYS/QHP OP CAR RHAB W/ECG: CPT

## 2024-01-17 ENCOUNTER — TREATMENT (OUTPATIENT)
Dept: CARDIAC REHAB | Facility: HOSPITAL | Age: 69
End: 2024-01-17
Payer: MEDICARE

## 2024-01-17 DIAGNOSIS — Z95.5 STENTED CORONARY ARTERY: Primary | ICD-10-CM

## 2024-01-17 PROCEDURE — 93798 PHYS/QHP OP CAR RHAB W/ECG: CPT

## 2024-01-19 ENCOUNTER — APPOINTMENT (OUTPATIENT)
Dept: CARDIAC REHAB | Facility: HOSPITAL | Age: 69
End: 2024-01-19
Payer: MEDICARE

## 2024-01-22 ENCOUNTER — TREATMENT (OUTPATIENT)
Dept: CARDIAC REHAB | Facility: HOSPITAL | Age: 69
End: 2024-01-22
Payer: MEDICARE

## 2024-01-22 DIAGNOSIS — Z95.5 STENTED CORONARY ARTERY: Primary | ICD-10-CM

## 2024-01-22 PROCEDURE — 93798 PHYS/QHP OP CAR RHAB W/ECG: CPT

## 2024-01-24 ENCOUNTER — TREATMENT (OUTPATIENT)
Dept: CARDIAC REHAB | Facility: HOSPITAL | Age: 69
End: 2024-01-24
Payer: MEDICARE

## 2024-01-24 DIAGNOSIS — Z95.5 STENTED CORONARY ARTERY: Primary | ICD-10-CM

## 2024-01-24 PROCEDURE — 93798 PHYS/QHP OP CAR RHAB W/ECG: CPT

## 2024-01-26 ENCOUNTER — APPOINTMENT (OUTPATIENT)
Dept: CARDIAC REHAB | Facility: HOSPITAL | Age: 69
End: 2024-01-26
Payer: MEDICARE

## 2024-01-29 ENCOUNTER — TREATMENT (OUTPATIENT)
Dept: CARDIAC REHAB | Facility: HOSPITAL | Age: 69
End: 2024-01-29
Payer: MEDICARE

## 2024-01-29 DIAGNOSIS — Z95.5 STENTED CORONARY ARTERY: Primary | ICD-10-CM

## 2024-01-29 PROCEDURE — 93798 PHYS/QHP OP CAR RHAB W/ECG: CPT

## 2024-01-30 ENCOUNTER — OFFICE VISIT (OUTPATIENT)
Dept: CARDIOLOGY | Facility: CLINIC | Age: 69
End: 2024-01-30
Payer: MEDICARE

## 2024-01-30 VITALS
BODY MASS INDEX: 36.58 KG/M2 | DIASTOLIC BLOOD PRESSURE: 88 MMHG | OXYGEN SATURATION: 99 % | HEIGHT: 69 IN | SYSTOLIC BLOOD PRESSURE: 142 MMHG | HEART RATE: 58 BPM | WEIGHT: 247 LBS

## 2024-01-30 DIAGNOSIS — I10 ESSENTIAL HYPERTENSION: ICD-10-CM

## 2024-01-30 DIAGNOSIS — I25.5 ISCHEMIC CARDIOMYOPATHY: ICD-10-CM

## 2024-01-30 DIAGNOSIS — I44.7 LEFT BUNDLE BRANCH BLOCK: ICD-10-CM

## 2024-01-30 DIAGNOSIS — I48.19 PERSISTENT ATRIAL FIBRILLATION: Primary | ICD-10-CM

## 2024-01-30 PROCEDURE — 99214 OFFICE O/P EST MOD 30 MIN: CPT | Performed by: INTERNAL MEDICINE

## 2024-01-30 PROCEDURE — 93000 ELECTROCARDIOGRAM COMPLETE: CPT | Performed by: INTERNAL MEDICINE

## 2024-01-30 PROCEDURE — 3079F DIAST BP 80-89 MM HG: CPT | Performed by: INTERNAL MEDICINE

## 2024-01-30 PROCEDURE — 3077F SYST BP >= 140 MM HG: CPT | Performed by: INTERNAL MEDICINE

## 2024-01-30 PROCEDURE — 1160F RVW MEDS BY RX/DR IN RCRD: CPT | Performed by: INTERNAL MEDICINE

## 2024-01-30 PROCEDURE — 1159F MED LIST DOCD IN RCRD: CPT | Performed by: INTERNAL MEDICINE

## 2024-01-30 RX ORDER — AMIODARONE HYDROCHLORIDE 200 MG/1
200 TABLET ORAL DAILY
Start: 2024-01-30

## 2024-01-30 NOTE — PROGRESS NOTES
"Progress note      Name: Dillon Aaron ADMIT: (Not on file)   : 1955  PCP: Tre Burkett MD    MRN: [id number] LOS: 0 days   AGE/SEX: 68 y.o. male  ROOM: Room/bed info not found     Chief Complaint   Patient presents with    Follow-up     Follow up ablation       Subjective       History of present illness  Dillon Aaron is a 68-year-old male patient who has coronary artery disease status post PCI to the LAD on 2023, chronic left bundle branch block, atrial fibrillation diagnosed in 2023.  He underwent BLANCHE guided cardioversion on 2023.  After that he underwent ablation on 2023.  Patient is here today for follow-up.  He has been in normal rhythm, however he still feels short of breath with moderate exertion.    Past Medical History:   Diagnosis Date    Atrial fibrillation     Hyperlipidemia     Hypertension     MONICA (obstructive sleep apnea)     wears cpap    Rectal cancer     in remission       Family History   Problem Relation Age of Onset    Heart disease Mother     Heart disease Father      Social History     Tobacco Use    Smoking status: Never    Smokeless tobacco: Never   Vaping Use    Vaping Use: Never used   Substance Use Topics    Alcohol use: Not Currently     Comment: occasionally; \"a case of beer a year\"    Drug use: Never       Current Outpatient Medications:     amiodarone (PACERONE) 200 MG tablet, Take 1 tablet by mouth Daily., Disp: , Rfl:     apixaban (ELIQUIS) 5 MG tablet tablet, Take 1 tablet by mouth Every 12 (Twelve) Hours. Indications: Atrial Fibrillation, Disp: 56 tablet, Rfl: 0    clopidogrel (PLAVIX) 75 MG tablet, Take 1 tablet by mouth Daily., Disp: 30 tablet, Rfl: 6    finasteride (PROSCAR) 5 MG tablet, Take 1 tablet by mouth Daily., Disp: , Rfl:     metoprolol succinate XL (TOPROL-XL) 25 MG 24 hr tablet, Take 0.5 tablets by mouth Daily., Disp: 30 tablet, Rfl: 11    nitroglycerin (NITROSTAT) 0.4 MG SL tablet, Place 1 tablet under the tongue Every 5 (Five) " Minutes As Needed for Chest Pain (Systolic BP Greater Than 100). Take no more than 3 doses in 15 minutes., Disp: 100 tablet, Rfl: 12    rosuvastatin (CRESTOR) 20 MG tablet, Take 1 tablet by mouth Every Night., Disp: 90 tablet, Rfl: 1    sacubitril-valsartan (ENTRESTO) 24-26 MG tablet, Take 1 tablet by mouth Every 12 (Twelve) Hours., Disp: 56 tablet, Rfl: 0    vitamin D3 125 MCG (5000 UT) capsule capsule, Take 1 capsule by mouth Daily., Disp: , Rfl:   Allergies:  Adhesive tape      Physical Exam  VITALS REVIEWED    General:      well developed, in no acute distress.    Head:      normocephalic and atraumatic.    Eyes:      PERRL/EOM intact, conjunctiva and sclera clear with out nystagmus.    Neck:      no masses, thyromegaly,  trachea central with normal respiratory effort and PMI displaced laterally  Lungs:      Clear to auscultation bilaterally  Heart:       Regular rate and rhythm  Msk:      no deformity or scoliosis noted of thoracic or lumbar spine.    Pulses:      pulses normal in all 4 extremities.    Extremities:       No lower extremity edema  Neurologic:      no focal deficits.   alert oriented x3  Skin:      intact without lesions or rashes.    Psych:      alert and cooperative; normal mood and affect; normal attention span and concentration.      Result Review :               Pertinent cardiac workup    Heart cath 12/1/2023 90% LAD disease, PCI.  Echo 11/27/2023 ejection fraction 15 to 20%        ECG 12 Lead    Date/Time: 1/30/2024 11:18 AM  Performed by: Alvaro Nava MD    Authorized by: Alvaro Nava MD  Comparison: compared with previous ECG   Similar to previous ECG  Rhythm: sinus rhythm  Rate: normal  BPM: 58  Conduction: left bundle branch block  ST Segments: ST segments normal  QRS axis: normal  Other findings: non-specific ST-T wave changes              Assessment and Plan      Dillon Aaron is a 68-year-old male patient who has complex cardiac issues including coronary artery disease  status post PCI to the LAD, also has atrial fibrillation for which he received cardioversion in early December followed by A-fib ablation on 12/28/2023.  Patient has been staying in sinus rhythm, he has been having some possible side effects from amiodarone, I will decrease that to once a day.  Also he is still experiencing CHF symptoms with fatigue and shortness of breath with moderate exertion.  He is on optimal medical therapy for his cardiomyopathy with Toprol and Entresto.  Patient has an appointment with Dr. Ortiz in March, advised him to call their office to schedule an echocardiogram prior to that visit and if his EF is still low despite rhythm restoration and medical therapy for his CHF, then he will need a biventricular ICD.    Diagnoses and all orders for this visit:    1. Persistent atrial fibrillation (Primary)    2. Ischemic cardiomyopathy    3. Left bundle branch block    4. Essential hypertension    Other orders  -     amiodarone (PACERONE) 200 MG tablet; Take 1 tablet by mouth Daily.  -     apixaban (ELIQUIS) 5 MG tablet tablet; Take 1 tablet by mouth Every 12 (Twelve) Hours. Indications: Atrial Fibrillation  Dispense: 56 tablet; Refill: 0  -     sacubitril-valsartan (ENTRESTO) 24-26 MG tablet; Take 1 tablet by mouth Every 12 (Twelve) Hours.  Dispense: 56 tablet; Refill: 0  -     ECG 12 Lead           Return in April.  Patient was given instructions and counseling regarding his condition or for health maintenance advice. Please see specific information pulled into the AVS if appropriate.

## 2024-01-31 ENCOUNTER — TREATMENT (OUTPATIENT)
Dept: CARDIAC REHAB | Facility: HOSPITAL | Age: 69
End: 2024-01-31
Payer: MEDICARE

## 2024-01-31 DIAGNOSIS — Z95.5 STENTED CORONARY ARTERY: Primary | ICD-10-CM

## 2024-01-31 PROCEDURE — 93798 PHYS/QHP OP CAR RHAB W/ECG: CPT

## 2024-02-02 ENCOUNTER — APPOINTMENT (OUTPATIENT)
Dept: CARDIAC REHAB | Facility: HOSPITAL | Age: 69
End: 2024-02-02
Payer: MEDICARE

## 2024-02-05 ENCOUNTER — APPOINTMENT (OUTPATIENT)
Dept: CARDIAC REHAB | Facility: HOSPITAL | Age: 69
End: 2024-02-05
Payer: MEDICARE

## 2024-02-07 ENCOUNTER — APPOINTMENT (OUTPATIENT)
Dept: CARDIAC REHAB | Facility: HOSPITAL | Age: 69
End: 2024-02-07
Payer: MEDICARE

## 2024-02-09 ENCOUNTER — APPOINTMENT (OUTPATIENT)
Dept: CARDIAC REHAB | Facility: HOSPITAL | Age: 69
End: 2024-02-09
Payer: MEDICARE

## 2024-02-12 ENCOUNTER — TELEPHONE (OUTPATIENT)
Dept: CARDIOLOGY | Facility: CLINIC | Age: 69
End: 2024-02-12
Payer: MEDICARE

## 2024-02-12 ENCOUNTER — APPOINTMENT (OUTPATIENT)
Dept: CARDIAC REHAB | Facility: HOSPITAL | Age: 69
End: 2024-02-12
Payer: MEDICARE

## 2024-02-12 DIAGNOSIS — I25.5 ISCHEMIC CARDIOMYOPATHY: Primary | ICD-10-CM

## 2024-02-12 DIAGNOSIS — I48.19 PERSISTENT ATRIAL FIBRILLATION: ICD-10-CM

## 2024-02-14 ENCOUNTER — APPOINTMENT (OUTPATIENT)
Dept: CARDIAC REHAB | Facility: HOSPITAL | Age: 69
End: 2024-02-14
Payer: MEDICARE

## 2024-02-16 ENCOUNTER — TELEPHONE (OUTPATIENT)
Dept: CARDIAC REHAB | Facility: HOSPITAL | Age: 69
End: 2024-02-16
Payer: MEDICARE

## 2024-02-16 ENCOUNTER — APPOINTMENT (OUTPATIENT)
Dept: CARDIAC REHAB | Facility: HOSPITAL | Age: 69
End: 2024-02-16
Payer: MEDICARE

## 2024-02-19 ENCOUNTER — APPOINTMENT (OUTPATIENT)
Dept: CARDIAC REHAB | Facility: HOSPITAL | Age: 69
End: 2024-02-19
Payer: MEDICARE

## 2024-02-19 ENCOUNTER — TREATMENT (OUTPATIENT)
Dept: CARDIAC REHAB | Facility: HOSPITAL | Age: 69
End: 2024-02-19
Payer: MEDICARE

## 2024-02-19 DIAGNOSIS — Z95.5 STENTED CORONARY ARTERY: Primary | ICD-10-CM

## 2024-02-19 PROCEDURE — 93798 PHYS/QHP OP CAR RHAB W/ECG: CPT

## 2024-02-21 ENCOUNTER — APPOINTMENT (OUTPATIENT)
Dept: CARDIAC REHAB | Facility: HOSPITAL | Age: 69
End: 2024-02-21
Payer: MEDICARE

## 2024-02-21 ENCOUNTER — TREATMENT (OUTPATIENT)
Dept: CARDIAC REHAB | Facility: HOSPITAL | Age: 69
End: 2024-02-21
Payer: MEDICARE

## 2024-02-21 DIAGNOSIS — Z95.5 STENTED CORONARY ARTERY: Primary | ICD-10-CM

## 2024-02-21 PROCEDURE — 93798 PHYS/QHP OP CAR RHAB W/ECG: CPT

## 2024-02-23 ENCOUNTER — TREATMENT (OUTPATIENT)
Dept: CARDIAC REHAB | Facility: HOSPITAL | Age: 69
End: 2024-02-23
Payer: MEDICARE

## 2024-02-23 DIAGNOSIS — Z95.5 STENTED CORONARY ARTERY: Primary | ICD-10-CM

## 2024-02-23 PROCEDURE — 93798 PHYS/QHP OP CAR RHAB W/ECG: CPT

## 2024-02-26 ENCOUNTER — TREATMENT (OUTPATIENT)
Dept: CARDIAC REHAB | Facility: HOSPITAL | Age: 69
End: 2024-02-26
Payer: MEDICARE

## 2024-02-26 DIAGNOSIS — Z95.5 STENTED CORONARY ARTERY: Primary | ICD-10-CM

## 2024-02-26 PROCEDURE — 93798 PHYS/QHP OP CAR RHAB W/ECG: CPT

## 2024-02-28 ENCOUNTER — HOSPITAL ENCOUNTER (OUTPATIENT)
Dept: CARDIOLOGY | Facility: HOSPITAL | Age: 69
Discharge: HOME | End: 2024-02-28
Admitting: INTERNAL MEDICINE
Payer: MEDICARE

## 2024-02-28 ENCOUNTER — TREATMENT (OUTPATIENT)
Dept: CARDIAC REHAB | Facility: HOSPITAL | Age: 69
End: 2024-02-28
Payer: MEDICARE

## 2024-02-28 VITALS
SYSTOLIC BLOOD PRESSURE: 142 MMHG | HEIGHT: 68 IN | DIASTOLIC BLOOD PRESSURE: 88 MMHG | WEIGHT: 247 LBS | BODY MASS INDEX: 37.44 KG/M2

## 2024-02-28 DIAGNOSIS — Z95.5 STENTED CORONARY ARTERY: Primary | ICD-10-CM

## 2024-02-28 DIAGNOSIS — I25.5 ISCHEMIC CARDIOMYOPATHY: ICD-10-CM

## 2024-02-28 DIAGNOSIS — I48.19 PERSISTENT ATRIAL FIBRILLATION: ICD-10-CM

## 2024-02-28 PROCEDURE — 93798 PHYS/QHP OP CAR RHAB W/ECG: CPT

## 2024-02-28 PROCEDURE — 93306 TTE W/DOPPLER COMPLETE: CPT

## 2024-02-28 PROCEDURE — 93356 MYOCRD STRAIN IMG SPCKL TRCK: CPT

## 2024-03-01 ENCOUNTER — APPOINTMENT (OUTPATIENT)
Dept: CARDIAC REHAB | Facility: HOSPITAL | Age: 69
End: 2024-03-01
Payer: MEDICARE

## 2024-03-01 LAB
BH CV ECHO LEFT VENTRICLE GLOBAL LONGITUDINAL STRAIN: -12.4 %
BH CV ECHO MEAS - ACS: 2.2 CM
BH CV ECHO MEAS - AO MAX PG: 8.8 MMHG
BH CV ECHO MEAS - AO MEAN PG: 5 MMHG
BH CV ECHO MEAS - AO V2 MAX: 148 CM/SEC
BH CV ECHO MEAS - AO V2 VTI: 31.6 CM
BH CV ECHO MEAS - AVA(I,D): 3 CM2
BH CV ECHO MEAS - EDV(CUBED): 91.1 ML
BH CV ECHO MEAS - EDV(MOD-SP4): 166 ML
BH CV ECHO MEAS - EF(MOD-SP4): 45.7 %
BH CV ECHO MEAS - ESV(CUBED): 39.3 ML
BH CV ECHO MEAS - ESV(MOD-SP4): 90.1 ML
BH CV ECHO MEAS - FS: 24.4 %
BH CV ECHO MEAS - IVS/LVPW: 1 CM
BH CV ECHO MEAS - IVSD: 1.1 CM
BH CV ECHO MEAS - LA DIMENSION: 3.9 CM
BH CV ECHO MEAS - LAT PEAK E' VEL: 7.2 CM/SEC
BH CV ECHO MEAS - LV DIASTOLIC VOL/BSA (35-75): 74.2 CM2
BH CV ECHO MEAS - LV MASS(C)D: 175 GRAMS
BH CV ECHO MEAS - LV MAX PG: 5.6 MMHG
BH CV ECHO MEAS - LV MEAN PG: 3 MMHG
BH CV ECHO MEAS - LV SYSTOLIC VOL/BSA (12-30): 40.3 CM2
BH CV ECHO MEAS - LV V1 MAX: 118 CM/SEC
BH CV ECHO MEAS - LV V1 VTI: 24.8 CM
BH CV ECHO MEAS - LVIDD: 4.5 CM
BH CV ECHO MEAS - LVIDS: 3.4 CM
BH CV ECHO MEAS - LVOT AREA: 3.8 CM2
BH CV ECHO MEAS - LVOT DIAM: 2.2 CM
BH CV ECHO MEAS - LVPWD: 1.1 CM
BH CV ECHO MEAS - MED PEAK E' VEL: 7 CM/SEC
BH CV ECHO MEAS - MV A MAX VEL: 80.2 CM/SEC
BH CV ECHO MEAS - MV DEC SLOPE: 149 CM/SEC2
BH CV ECHO MEAS - MV DEC TIME: 0.29 SEC
BH CV ECHO MEAS - MV E MAX VEL: 59.7 CM/SEC
BH CV ECHO MEAS - MV E/A: 0.74
BH CV ECHO MEAS - MV MAX PG: 3.4 MMHG
BH CV ECHO MEAS - MV MEAN PG: 1 MMHG
BH CV ECHO MEAS - MV P1/2T: 123.1 MSEC
BH CV ECHO MEAS - MV V2 VTI: 29.6 CM
BH CV ECHO MEAS - MVA(P1/2T): 1.79 CM2
BH CV ECHO MEAS - MVA(VTI): 3.2 CM2
BH CV ECHO MEAS - PA ACC TIME: 0.12 SEC
BH CV ECHO MEAS - PA V2 MAX: 114 CM/SEC
BH CV ECHO MEAS - RV MAX PG: 2.5 MMHG
BH CV ECHO MEAS - RV V1 MAX: 79 CM/SEC
BH CV ECHO MEAS - RV V1 VTI: 18 CM
BH CV ECHO MEAS - RVDD: 4.9 CM
BH CV ECHO MEAS - SI(MOD-SP4): 33.9 ML/M2
BH CV ECHO MEAS - SV(LVOT): 94.3 ML
BH CV ECHO MEAS - SV(MOD-SP4): 75.9 ML
BH CV ECHO MEAS - TAPSE (>1.6): 2.14 CM
BH CV ECHO MEASUREMENTS AVERAGE E/E' RATIO: 8.41
BH CV XLRA - TDI S': 10.8 CM/SEC
SINUS: 3.4 CM
STJ: 2.6 CM

## 2024-03-04 ENCOUNTER — TREATMENT (OUTPATIENT)
Dept: CARDIAC REHAB | Facility: HOSPITAL | Age: 69
End: 2024-03-04
Payer: MEDICARE

## 2024-03-04 DIAGNOSIS — Z95.5 STENTED CORONARY ARTERY: Primary | ICD-10-CM

## 2024-03-04 PROCEDURE — 93798 PHYS/QHP OP CAR RHAB W/ECG: CPT

## 2024-03-06 ENCOUNTER — OFFICE VISIT (OUTPATIENT)
Dept: CARDIOLOGY | Facility: CLINIC | Age: 69
End: 2024-03-06
Payer: MEDICARE

## 2024-03-06 ENCOUNTER — TREATMENT (OUTPATIENT)
Dept: CARDIAC REHAB | Facility: HOSPITAL | Age: 69
End: 2024-03-06
Payer: MEDICARE

## 2024-03-06 VITALS
WEIGHT: 244 LBS | BODY MASS INDEX: 36.98 KG/M2 | RESPIRATION RATE: 18 BRPM | HEART RATE: 58 BPM | SYSTOLIC BLOOD PRESSURE: 122 MMHG | DIASTOLIC BLOOD PRESSURE: 74 MMHG | HEIGHT: 68 IN

## 2024-03-06 DIAGNOSIS — I44.7 LEFT BUNDLE BRANCH BLOCK: ICD-10-CM

## 2024-03-06 DIAGNOSIS — Z95.5 STENTED CORONARY ARTERY: Primary | ICD-10-CM

## 2024-03-06 DIAGNOSIS — I25.5 ISCHEMIC CARDIOMYOPATHY: Primary | ICD-10-CM

## 2024-03-06 DIAGNOSIS — I10 ESSENTIAL HYPERTENSION: ICD-10-CM

## 2024-03-06 DIAGNOSIS — R94.39 ABNORMAL STRESS TEST: ICD-10-CM

## 2024-03-06 DIAGNOSIS — I48.19 PERSISTENT ATRIAL FIBRILLATION: ICD-10-CM

## 2024-03-06 DIAGNOSIS — I48.0 PAROXYSMAL ATRIAL FIBRILLATION: ICD-10-CM

## 2024-03-06 PROCEDURE — 93798 PHYS/QHP OP CAR RHAB W/ECG: CPT

## 2024-03-08 ENCOUNTER — APPOINTMENT (OUTPATIENT)
Dept: CARDIAC REHAB | Facility: HOSPITAL | Age: 69
End: 2024-03-08
Payer: MEDICARE

## 2024-03-11 ENCOUNTER — APPOINTMENT (OUTPATIENT)
Dept: CARDIAC REHAB | Facility: HOSPITAL | Age: 69
End: 2024-03-11
Payer: MEDICARE

## 2024-03-13 ENCOUNTER — APPOINTMENT (OUTPATIENT)
Dept: CARDIAC REHAB | Facility: HOSPITAL | Age: 69
End: 2024-03-13
Payer: MEDICARE

## 2024-03-15 ENCOUNTER — APPOINTMENT (OUTPATIENT)
Dept: CARDIAC REHAB | Facility: HOSPITAL | Age: 69
End: 2024-03-15
Payer: MEDICARE

## 2024-03-18 ENCOUNTER — TREATMENT (OUTPATIENT)
Dept: CARDIAC REHAB | Facility: HOSPITAL | Age: 69
End: 2024-03-18
Payer: MEDICARE

## 2024-03-18 DIAGNOSIS — Z95.5 STENTED CORONARY ARTERY: Primary | ICD-10-CM

## 2024-03-18 PROCEDURE — 93798 PHYS/QHP OP CAR RHAB W/ECG: CPT

## 2024-03-19 NOTE — PROGRESS NOTES
Cardiology Clinic Note  Truong Ortiz MD, PhD    Subjective:     Encounter Date:03/06/2024      Patient ID: Dillon Aaron is a 68 y.o. male.    Chief Complaint:  Chief Complaint   Patient presents with    Follow-up       HPI:          Previously I had the pleasure to see this very pleasant 68-year-old gentleman in clinic.    He has history of left bundle branch block as well as essential hypertension and paroxysmal atrial fibrillation.  He had previous abnormal stress test ordered for significant dyspnea on exertion and atypical symptoms which demonstrated ischemia in the septum.   He underwent invasive ischemic evaluation demonstrating 60% mid LAD disease compromising ostial proximal diagonal and the continuation LAD, FFR revealed borderline hemodynamic status with 0.91 value not meeting criteria for intervention, this is been managed medically, he was cleared for hip surgery and completed this successfully..   He has been followed intermittently with paroxysmal nature of his atrial fibrillation as well as CV comorbidities and nonobstructive CAD.       He presented back ultimately with recurrent atrial fibrillation t,  [id number] score of 3, lipid-lowering therapy, afterload reduction, heart rate control, secondary prevention and reevaluation of his coronary anatomy.  December 2023 work-up revealed abnormal stress test with anterior apical and septal reversibility concerning for progression of LAD and diagonal disease, December 2023 echo with newly reduced EF of 20% compared to 50% last year with need for invasive ischemic evaluation to evaluate LAD diagonal which underwent bifurcation PCI as documented below December 2023.  Also EP evaluation for more persistent atrial fibrillation which may be contributing to reduced EF in addition to left bundle branch block and dyssynchrony.  He underwent DC cardioversion followed by ablation for restoration of sinus rhythm.  He is on aspirin and Plavix in addition to Eliquis.   Angina-free less shortness of breath otherwise doing well     Diet exercise per AHA guidelines recommended  Mediterranean diet low-cholesterol        Review of systems negative for 14 point review of systems except as mentioned above     Historical data copied forward from previous encounters in EMR including the history, exam, and assessment/plan has been reviewed and is unchanged unless noted otherwise.     Cardiac medicines reviewed with risk, benefits, and necessity of each discussed.     Risk and benefit of cardiac catheterization i reviewed including death heart attack stroke pain bleeding infection need for vascular /cardiovascular surgery were discussed and the patient         Reviewed below     Physical Exam  Regular rate and rhythm no rubs murmurs gallops  No heave no lift  No clubbing cyanosis or edema  Clear to auscultation  Normocephalic atraumatic pupils equal round  Intact grossly  Soft nontender nondistended  Normal pulses normal cap refill  No carotid bruits or JVD  Unchanged from prior encounter     Assessment:        Coronary artery disease  Cardiomyopathy with newly reduced EF 20% compared to 50% previously last year  Abnormal stress test with reversibility at the apex and septum  December 2023 shockwave balloon lithotripsy to the mid LAD diagonal bifurcation 2.5 x 12 utilization of 80 pulses circumferential calcium at least 180 degrees  3.,  Bifurcation PCI, 2.5 x 12 Xience drug-eluting stent postdilated at 20 dinh to the ostial proximal diagonal branch at 3 mm  4.,  LAD PCI 2.5 x 28 Xience drug-eluting stent in the midportion spanning the diagonal bifurcation, deployment at 20 dinh postdilatation 3.25 x 12 NC balloon at 18 to 20 dinh and distal to proximal fashion  Crestor 20 will continue  Goal LDL less than 70 optimally less than 55    Hyperlipidemia, as above     Recurrent A-fib RVR  Status post BLANCHE cardioversion followed by A-fib ablation December 2023  Continue Xarelto  Amiodarone 200  "daily  Metoprolol 12.5 daily    Combined ischemic and nonischemic cardiomyopathy  Status post revascularization  Decline in EF likely more related to A-fib RVR recurrence of arrhythmia  Now maintaining sinus rhythm  Beta-blocker on board  Continue Entresto     dyspnea on exertion, secondary to the above     See him back in 3 months    Truong Ortiz MD, PhD  Objective:         /74 (BP Location: Left arm, Patient Position: Sitting)   Pulse 58   Resp 18   Ht 172.7 cm (68\")   Wt 111 kg (244 lb)   BMI 37.10 kg/m²       The pleasure to be involved in this patient's cardiovascular care.  Please call with any questions or concerns  Truong Ortiz MD, PhD    Most recent EKG as reviewed and interpreted by me:  Procedures     Most recent echo as reviewed and interpreted by me:  Results for orders placed during the hospital encounter of 02/28/24    Adult Transthoracic Echo Complete W/ Color, Spectral and Contrast if Necessary Per Protocol    Interpretation Summary    Left ventricular systolic function is mildly decreased. Left ventricular ejection fraction appears to be 46 - 50%.    Left ventricular diastolic function is consistent with (grade Ia w/high LAP) impaired relaxation.    Estimated right ventricular systolic pressure from tricuspid regurgitation is normal (<35 mmHg).    Mildly reduced EF 45%, hypokinesis at apex c/w prior injury  Other segments thicken normally  Mild MR  Grade 1 diastolic dysfunction      Most recent stress test as reviewed and interpreted by me:  Results for orders placed during the hospital encounter of 11/24/23    Stress Test With Myocardial Perfusion One Day    Interpretation Summary    Left ventricular ejection fraction is severely reduced (Calculated EF = 22%).    Abnormal LV wall motion consistent with severe hypokinesis.    Myocardial perfusion imaging indicates a moderate-sized infarct located in the anterior wall, septal wall and apex with moderate brad-infarct ischemia.    " Impressions are consistent with a high risk study.    There is no prior study available for comparison. Resting and stress images were compared Resting images demonstrate decreased counts in the mid to apical anterior wall and anteroseptum consistent with prior MI Stress images demonstrate worsening of brad-infarct ischemia at least moderate in the anteroseptum, anterior wall apex and inferoseptum.  EF 22%, dilated ventricle  mL Abnormal study.    Findings consistent with an equivocal ECG stress test.    Abnormal study  Moderate brad-infarct ischemia in the septum anterior wall and apex  Severely reduced EF 22%  Underlying atrial fibrillation with left bundle branch block  Dilated ventricle  Intermediate/ high risk study      Most recent cardiac catheterization as reviewed interpreted by me:  Results for orders placed during the hospital encounter of 12/01/23    Cardiac Catheterization/Vascular Study    Conclusion  Primary operative Truong Ortiz MD, PhD  [facility] cardiology  Date of service 12-1-2023    Procedure  1.  Left heart catheterization coronary angiography left ventriculography on MENESES position  2.  Shockwave balloon lithotripsy to the mid LAD diagonal bifurcation 2.5 x 12 utilization of 80 pulses circumferential calcium at least 180 degrees  3.,  Bifurcation PCI, 2.5 x 12 Xience drug-eluting stent postdilated at 20 dinh to the ostial proximal diagonal branch at 3 mm  4.,  LAD PCI 2.5 x 28 Xience drug-eluting stent in the midportion spanning the diagonal bifurcation, deployment at 20 dinh postdilatation 3.25 x 12 NC balloon at 18 to 20 dinh and distal to proximal fashion    Reduction of stenosis ostial diagonal branch 10% residual, less than 10% residual stenosis of LAD, HONORIO-3 flow pre and post, no distal wire trauma edge dissection or other complication    After informed consent patient brought to the Cath Lab sterilely prepped and draped in usual fashion exposure of the right groin for right  common femoral arterial access via micropuncture and modified Seldinger technique with placement of a 6 Mauritanian sheath.  A 3 5 guidewires advanced aortic valve followed by diagnostic JL 4 and JR4 catheters for selective left and right coronary angiography respectively.  JR4 was used to cross aortic valve followed by hand-injection for LV gram EDP assessment and pullback assessment of the transaortic valve gradient.  There was obstructive disease in the mid LAD at the diagonal bifurcation at least 80% to 90% lara 1/1/1 morphology with 180 degrees of circumferential calcium.  There was progression year-over-year from last year comparative and angiography.  He has a newly reduced EF of 20%, previously 50% with abnormal stress test demonstrating reversibility of the anterior wall apex and septum.  Decision made for intervention.  Patient patient heparinized with 100 units/kg of heparin with repeated boluses maintain ACT greater than 250 on background therapy of aspirin.  A 7 Mauritanian sheath was then upsized along with 7 Mauritanian EBU 4.0 guide to engage left main, run-through wire to the diagonal branch and a BMW wire to the LAD, given calcification and bifurcation morphology, shockwave balloon lithotripsy with 2.5 x 12 balloon was performed with 80 pulses in the LAD.  We attempted to perform also of the ostial proximal diagonal branch however secondary wire wrapped we were unable to advance the deployed shockwave balloon.  This was ultimately removed followed by predilation of the ostial proximal diagonal segment with 2.5 x 12 NC balloon at 18 dinh with full expansion, further predilation was also performed in the LAD similarly, a 2.5 x 12 Xience drug stent was positioned the ostial proximal diagonal branch and a 2.5 x 28 Xience negative stent in the mid LAD, the diagonal lumen was deployed at 16 dinh followed by pullback of the stent balloon for postdilatation at 20 to 22 dinh for 30 seconds with great angiographic results,  mild stepdown distally but otherwise much improvement of the ostial proximal segment.  This was removed followed by deployment of the LAD stent at 20 dinh which was viewed to be slightly undersized with improved runoff after predilation and nitroglycerin.  This was reinflated 22 dinh followed by postdilatation with 3.25 x 12 noncompliant balloon at 18 dinh and distal to proximal fashion with great angiographic results, 10% residual stenosis of the lesion, 20% residual stenosis in the ostial proximal diagonal branch.  Proximal optimization was performed as well at 22 dinh above the diagonal takeoff with great results.  No distal wire trauma edge dissection or complications.  6 Upper sorbian Angio-Seal was used to close right, from arteriotomy with immediate hemostasis and maintenance of distal pulses.  He left Cath Lab chest pain-free hemodynamically electrically stable alert talkative staff neurologically gross intact bilaterally    Findings  1.  Opening aortic pressure of 109/62, closing pressure was unchanged  2.,  No LV gram performed to conserve contrast, LVEDP elevated 23-24  3.  Normal transorbital gradient on pullback    Angiography  1 left main normal no disease  2.  The LAD is a medium caliber vessel with 80 to 90% disease at the bifurcation including the ostial proximal diagonal branch which is also 80 to 90%.  Remainder the vessel diffuse luminal irregularities only 20%  3.  Circumflex large caliber nondominant vessel with minimal angiographic disease throughout less than 10%  Over 4 RCA is a large-caliber dominant vessel, diffuse luminal irregularities less than 20% throughout    Conclusions recommendations  1 successful bifurcation stenting, 10% residual stenosis in each vessel at the bifurcation status post shockwave balloon lithotripsy and high-pressure postdilatation and proximal optimization as described  2.  Aspirin and P2 Y12 B inhibitor will be continued with cessation of aspirin in 30 days.  He will be  back on anticoagulation given underlying atrial fibrillation    High intensity statin  Beta-blocker  Patient be admitted for rate control of atrial fibrillation with consult electrophysiology for consideration for elective DC cardioversion given severely reduced LV systolic function likely contributory by underlying atrial fibrillation and left bundle branch block with consideration for device placement with prior sinus bradycardia not amenable for beta-blockers previously essentially with tachybradycardia syndrome and likely combined ischemic nonischemic cardiomyopathy    Further recommendation follow findings and clinical course    Truong Ortiz MD, PhD    The following portions of the patient's history were reviewed and updated as appropriate: allergies, current medications, past family history, past medical history, past social history, past surgical history, and problem list.      ROS:  14 point review of systems negative except as mentioned above    Current Outpatient Medications:     amiodarone (PACERONE) 200 MG tablet, Take 1 tablet by mouth Daily., Disp: , Rfl:     apixaban (ELIQUIS) 5 MG tablet tablet, Take 1 tablet by mouth Every 12 (Twelve) Hours. Indications: Atrial Fibrillation, Disp: 180 tablet, Rfl: 1    clopidogrel (PLAVIX) 75 MG tablet, Take 1 tablet by mouth Daily., Disp: 30 tablet, Rfl: 6    finasteride (PROSCAR) 5 MG tablet, Take 1 tablet by mouth Daily., Disp: , Rfl:     metoprolol succinate XL (TOPROL-XL) 25 MG 24 hr tablet, Take 0.5 tablets by mouth Daily., Disp: 30 tablet, Rfl: 11    rosuvastatin (CRESTOR) 20 MG tablet, Take 1 tablet by mouth Every Night., Disp: 90 tablet, Rfl: 1    sacubitril-valsartan (ENTRESTO) 24-26 MG tablet, Take 1 tablet by mouth Every 12 (Twelve) Hours., Disp: 60 tablet, Rfl: 3    vitamin D3 125 MCG (5000 UT) capsule capsule, Take 1 capsule by mouth Daily., Disp: , Rfl:     nitroglycerin (NITROSTAT) 0.4 MG SL tablet, Place 1 tablet under the tongue Every 5 (Five)  "Minutes As Needed for Chest Pain (Systolic BP Greater Than 100). Take no more than 3 doses in 15 minutes., Disp: 100 tablet, Rfl: 12    Problem List:  Patient Active Problem List   Diagnosis    Gastroenteritis, acute    Mixed hyperlipidemia    Essential hypertension    Left bundle branch block    Low back pain    Obesity    Paroxysmal atrial fibrillation    Abnormal stress test    Chest pain, atypical    CAD (coronary artery disease)    Ischemic cardiomyopathy    Persistent atrial fibrillation    Chest pain    Contact dermatitis    A-fib     Past Medical History:  Past Medical History:   Diagnosis Date    Atrial fibrillation     Hyperlipidemia     Hypertension     MONICA (obstructive sleep apnea)     wears cpap    Rectal cancer     in remission     Past Surgical History:    Social History:  Social History     Socioeconomic History    Marital status:    Tobacco Use    Smoking status: Never    Smokeless tobacco: Never   Vaping Use    Vaping status: Never Used   Substance and Sexual Activity    Alcohol use: Not Currently     Comment: occasionally; \"a case of beer a year\"    Drug use: Never    Sexual activity: Defer     Allergies:  Allergies   Allergen Reactions    Adhesive Tape Hives     Blisters     Immunizations:  Immunization History   Administered Date(s) Administered    COVID-19 (MODERNA) 1st,2nd,3rd Dose Monovalent 02/10/2021, 03/16/2021    Flu Vaccine Quad PF 6-35MO 09/24/2016, 10/20/2019    Fluad Quad 65+ 10/20/2020    Fluzone (or Fluarix & Flulaval for VFC) >6mos 10/20/2019    Hepatitis A 04/29/2018, 11/05/2018    Influenza Quad Vaccine (Inpatient) 10/28/2017    Influenza, Unspecified 01/08/2013, 11/24/2013, 10/24/2014, 11/05/2018    Pneumococcal Conjugate 13-Valent (PCV13) 09/24/2020    Zostavax 12/08/2016            In-Office Procedure(s):  No orders to display        ASCVD RIsk Score::  The ASCVD Risk score (Aston DK, et al., 2019) failed to calculate for the following reasons:    The patient has a " prior MI or stroke diagnosis    Imaging:    Results for orders placed during the hospital encounter of 12/06/23    XR Chest 1 View    Narrative  XR CHEST 1 VW    Date of Exam: 12/6/2023 5:32 PM EST    Indication: chest pain    Comparison: PE protocol chest and 2 from 10/18/2023, portable chest radiograph dated 12/17/2016    Findings:    The lungs are grossly clear. Cardiac, hilar, and mediastinal silhouettes are stable with moderate cardiomegaly. Pulmonary vascularity is within upper range of normal. No pneumothorax or pleural effusions. The trachea is midline.  No acute bony  abnormality or aggressive appearing focal osseous lesions in the visualized bony thorax.    Impression  Impression:  Stable cardiomegaly. No active cardiopulmonary disease.        Electronically Signed: Kishor Segovia DO  12/6/2023 5:47 PM EST  Workstation ID: [id number]       Results for orders placed during the hospital encounter of 10/18/23    CT Angiogram Chest Pulmonary Embolism    Narrative  CT ANGIOGRAM CHEST PULMONARY EMBOLISM    Date of Exam: 10/18/2023 4:54 PM EDT    Indication: SHORTNESS OF BREATH.    Comparison: Chest radiograph 12/17/2016.    Technique: Axial CT images were obtained of the chest after the uneventful intravenous administration of iodinated contrast utilizing pulmonary embolism protocol.  Sagittal and coronal reconstructions were performed.  Automated exposure control and  iterative reconstruction methods were used.      Findings:  Thyroid unremarkable. No supraclavicular adenopathy. Aortic atherosclerotic disease without aneurysm. Dilated main pulmonary artery up to 4.1 cm suggestive of pulmonary arterial hypertension. Negative for pulmonary embolism. Mild cardiomegaly. Moderate  calcified coronary atherosclerotic disease. No pericardial effusion.    No suspicious adenopathy. Esophagus grossly unremarkable. Multiple gallstones noted. Thickened adrenal glands without measurable nodule. Degenerative osteoarthritis of  right glenohumeral joint. Surgical changes in the left humerus. Diffuse idiopathic  skeletal hyperostosis. No acute displaced fracture or aggressive bone lesion.    Central airways patent. Mild nonspecific peribronchial thickening most notably in the lower lobes. Mild smooth interlobar septal thickening with trace right effusion. Negative for infectious infiltrate, suspicious nodule or pneumothorax.    Impression  Impression:  1. Negative for pulmonary embolism.  2. Dilated main pulmonary artery which can be seen with pulmonary arterial hypertension.  3. Cardiomegaly with mild interstitial edema and trace right effusion.  4. Moderate coronary atherosclerotic disease.  5. Cholelithiasis.        Electronically Signed: Madan Bishop MD  10/18/2023 5:14 PM EDT  Workstation ID: [id number]      Results for orders placed during the hospital encounter of 10/18/23    CT Angiogram Chest Pulmonary Embolism    Narrative  CT ANGIOGRAM CHEST PULMONARY EMBOLISM    Date of Exam: 10/18/2023 4:54 PM EDT    Indication: SHORTNESS OF BREATH.    Comparison: Chest radiograph 12/17/2016.    Technique: Axial CT images were obtained of the chest after the uneventful intravenous administration of iodinated contrast utilizing pulmonary embolism protocol.  Sagittal and coronal reconstructions were performed.  Automated exposure control and  iterative reconstruction methods were used.      Findings:  Thyroid unremarkable. No supraclavicular adenopathy. Aortic atherosclerotic disease without aneurysm. Dilated main pulmonary artery up to 4.1 cm suggestive of pulmonary arterial hypertension. Negative for pulmonary embolism. Mild cardiomegaly. Moderate  calcified coronary atherosclerotic disease. No pericardial effusion.    No suspicious adenopathy. Esophagus grossly unremarkable. Multiple gallstones noted. Thickened adrenal glands without measurable nodule. Degenerative osteoarthritis of right glenohumeral joint. Surgical changes in the left  humerus. Diffuse idiopathic  skeletal hyperostosis. No acute displaced fracture or aggressive bone lesion.    Central airways patent. Mild nonspecific peribronchial thickening most notably in the lower lobes. Mild smooth interlobar septal thickening with trace right effusion. Negative for infectious infiltrate, suspicious nodule or pneumothorax.    Impression  Impression:  1. Negative for pulmonary embolism.  2. Dilated main pulmonary artery which can be seen with pulmonary arterial hypertension.  3. Cardiomegaly with mild interstitial edema and trace right effusion.  4. Moderate coronary atherosclerotic disease.  5. Cholelithiasis.        Electronically Signed: Madan Bishop MD  10/18/2023 5:14 PM EDT  Workstation ID: [id number]      Lab Review:   Hospital Outpatient Visit on 02/28/2024   Component Date Value    LV GLOBAL STRAIN  02/28/2024 -12.4     LVIDd 02/28/2024 4.5     LVIDs 02/28/2024 3.4     IVSd 02/28/2024 1.10     LVPWd 02/28/2024 1.10     FS 02/28/2024 24.4     IVS/LVPW 02/28/2024 1.00     ESV(cubed) 02/28/2024 39.3     LV Sys Vol (BSA correcte* 02/28/2024 40.3     EDV(cubed) 02/28/2024 91.1     LV Wright Vol (BSA correct* 02/28/2024 74.2     LV mass(C)d 02/28/2024 175.0     LVOT area 02/28/2024 3.8     LVOT diam 02/28/2024 2.20     EDV(MOD-sp4) 02/28/2024 166.0     ESV(MOD-sp4) 02/28/2024 90.1     SV(MOD-sp4) 02/28/2024 75.9     SI(MOD-sp4) 02/28/2024 33.9     EF(MOD-sp4) 02/28/2024 45.7     MV E max belia 02/28/2024 59.7     MV A max belia 02/28/2024 80.2     MV dec time 02/28/2024 0.29     MV E/A 02/28/2024 0.74     Med Peak E' Belia 02/28/2024 7.0     Lat Peak E' Belia 02/28/2024 7.2     Avg E/e' ratio 02/28/2024 8.41     SV(LVOT) 02/28/2024 94.3     RVIDd 02/28/2024 4.9     TAPSE (>1.6) 02/28/2024 2.14     RV S' 02/28/2024 10.8     LA dimension (2D)  02/28/2024 3.9     LV V1 max 02/28/2024 118.0     LV V1 max PG 02/28/2024 5.6     LV V1 mean PG 02/28/2024 3.0     LV V1 VTI 02/28/2024 24.8     Ao pk belia  02/28/2024 148.0     Ao max PG 02/28/2024 8.8     Ao mean PG 02/28/2024 5.0     Ao V2 VTI 02/28/2024 31.6     CHEKO(I,D) 02/28/2024 3.0     MV max PG 02/28/2024 3.4     MV mean PG 02/28/2024 1.00     MV V2 VTI 02/28/2024 29.6     MV P1/2t 02/28/2024 123.1     MVA(P1/2t) 02/28/2024 1.79     MVA(VTI) 02/28/2024 3.2     MV dec slope 02/28/2024 149.0     RV V1 max PG 02/28/2024 2.50     RV V1 max 02/28/2024 79.0     RV V1 VTI 02/28/2024 18.0     PA V2 max 02/28/2024 114.0     PA acc time 02/28/2024 0.12     ACS 02/28/2024 2.20     Sinus 02/28/2024 3.4     STJ 02/28/2024 2.6    Admission on 12/28/2023, Discharged on 12/29/2023   Component Date Value    Activated Clotting Time  12/28/2023 331 (H)     Activated Clotting Time  12/28/2023 320 (H)     Activated Clotting Time  12/28/2023 363 (H)     Activated Clotting Time  12/28/2023 320 (H)    Lab on 12/26/2023   Component Date Value    Protime 12/26/2023 10.9     INR 12/26/2023 1.00     Glucose 12/26/2023 110 (H)     BUN 12/26/2023 15     Creatinine 12/26/2023 1.26     Sodium 12/26/2023 142     Potassium 12/26/2023 4.6     Chloride 12/26/2023 106     CO2 12/26/2023 22.8     Calcium 12/26/2023 9.7     Total Protein 12/26/2023 6.6     Albumin 12/26/2023 4.6     ALT (SGPT) 12/26/2023 29     AST (SGOT) 12/26/2023 13     Alkaline Phosphatase 12/26/2023 75     Total Bilirubin 12/26/2023 0.7     Globulin 12/26/2023 2.0     A/G Ratio 12/26/2023 2.3     BUN/Creatinine Ratio 12/26/2023 11.9     Anion Gap 12/26/2023 13.2     eGFR 12/26/2023 62.1     Magnesium 12/26/2023 2.3     WBC 12/26/2023 7.43     RBC 12/26/2023 5.09     Hemoglobin 12/26/2023 16.2     Hematocrit 12/26/2023 47.6     MCV 12/26/2023 93.5     MCH 12/26/2023 31.8     MCHC 12/26/2023 34.0     RDW 12/26/2023 13.1     RDW-SD 12/26/2023 44.8     MPV 12/26/2023 10.3     Platelets 12/26/2023 195     Neutrophil % 12/26/2023 62.8     Lymphocyte % 12/26/2023 28.9     Monocyte % 12/26/2023 6.5     Eosinophil % 12/26/2023 1.1      Basophil % 12/26/2023 0.4     Immature Grans % 12/26/2023 0.3     Neutrophils, Absolute 12/26/2023 4.67     Lymphocytes, Absolute 12/26/2023 2.15     Monocytes, Absolute 12/26/2023 0.48     Eosinophils, Absolute 12/26/2023 0.08     Basophils, Absolute 12/26/2023 0.03     Immature Grans, Absolute 12/26/2023 0.02     nRBC 12/26/2023 0.0    Admission on 12/06/2023, Discharged on 12/07/2023   Component Date Value    QT Interval 12/06/2023 453     QTC Interval 12/06/2023 482     Glucose 12/06/2023 106 (H)     BUN 12/06/2023 25 (H)     Creatinine 12/06/2023 1.11     Sodium 12/06/2023 140     Potassium 12/06/2023 4.5     Chloride 12/06/2023 104     CO2 12/06/2023 27.0     Calcium 12/06/2023 9.5     BUN/Creatinine Ratio 12/06/2023 22.5     Anion Gap 12/06/2023 9.0     eGFR 12/06/2023 72.3     Protime 12/06/2023 10.9     INR 12/06/2023 1.00     PTT 12/06/2023 26.4 (L)     HS Troponin T 12/06/2023 153 (C)     WBC 12/06/2023 5.70     RBC 12/06/2023 4.49     Hemoglobin 12/06/2023 14.4     Hematocrit 12/06/2023 42.9     MCV 12/06/2023 95.6     MCH 12/06/2023 32.0     MCHC 12/06/2023 33.5     RDW 12/06/2023 14.3     RDW-SD 12/06/2023 47.3     MPV 12/06/2023 7.8     Platelets 12/06/2023 198     Neutrophil % 12/06/2023 52.6     Lymphocyte % 12/06/2023 34.0     Monocyte % 12/06/2023 10.3     Eosinophil % 12/06/2023 2.6     Basophil % 12/06/2023 0.5     Neutrophils, Absolute 12/06/2023 3.00     Lymphocytes, Absolute 12/06/2023 2.00     Monocytes, Absolute 12/06/2023 0.60     Eosinophils, Absolute 12/06/2023 0.10     Basophils, Absolute 12/06/2023 0.00     nRBC 12/06/2023 0.0     Extra Tube 12/06/2023 Hold for add-ons.     HS Troponin T 12/06/2023 119 (C)     Troponin T Delta 12/06/2023 -34 (L)     Glucose 12/07/2023 108 (H)     BUN 12/07/2023 17     Creatinine 12/07/2023 1.02     Sodium 12/07/2023 137     Potassium 12/07/2023 4.6     Chloride 12/07/2023 101     CO2 12/07/2023 25.0     Calcium 12/07/2023 9.3     BUN/Creatinine Ratio  12/07/2023 16.7     Anion Gap 12/07/2023 11.0     eGFR 12/07/2023 80.1     WBC 12/07/2023 5.40     RBC 12/07/2023 4.81     Hemoglobin 12/07/2023 15.1     Hematocrit 12/07/2023 45.2     MCV 12/07/2023 94.1     MCH 12/07/2023 31.4     MCHC 12/07/2023 33.3     RDW 12/07/2023 14.2     RDW-SD 12/07/2023 48.1     MPV 12/07/2023 8.3     Platelets 12/07/2023 171     Neutrophil % 12/07/2023 60.1     Lymphocyte % 12/07/2023 28.6     Monocyte % 12/07/2023 8.4     Eosinophil % 12/07/2023 2.2     Basophil % 12/07/2023 0.7     Neutrophils, Absolute 12/07/2023 3.30     Lymphocytes, Absolute 12/07/2023 1.60     Monocytes, Absolute 12/07/2023 0.50     Eosinophils, Absolute 12/07/2023 0.10     Basophils, Absolute 12/07/2023 0.00     nRBC 12/07/2023 0.1    Hospital Outpatient Visit on 12/04/2023   Component Date Value    BH CV ECHO SHUNT ASSESSM* 12/04/2023 1     QT Interval 12/04/2023 459     QTC Interval 12/04/2023 493    Admission on 12/01/2023, Discharged on 12/04/2023   Component Date Value    Activated Clotting Time  12/01/2023 304 (H)     Activated Clotting Time  12/01/2023 260 (H)     WBC 12/02/2023 6.30     RBC 12/02/2023 4.33     Hemoglobin 12/02/2023 13.6     Hematocrit 12/02/2023 40.7     MCV 12/02/2023 94.0     MCH 12/02/2023 31.5     MCHC 12/02/2023 33.5     RDW 12/02/2023 14.0     RDW-SD 12/02/2023 48.1     MPV 12/02/2023 7.9     Platelets 12/02/2023 168     Glucose 12/02/2023 110 (H)     BUN 12/02/2023 17     Creatinine 12/02/2023 1.17     Sodium 12/02/2023 140     Potassium 12/02/2023 4.5     Chloride 12/02/2023 105     CO2 12/02/2023 25.0     Calcium 12/02/2023 9.0     BUN/Creatinine Ratio 12/02/2023 14.5     Anion Gap 12/02/2023 10.0     eGFR 12/02/2023 67.9     Hemoglobin A1C 12/02/2023 6.20 (H)     Total Cholesterol 12/02/2023 93     Triglycerides 12/02/2023 97     HDL Cholesterol 12/02/2023 30 (L)     LDL Cholesterol  12/02/2023 44     VLDL Cholesterol 12/02/2023 19     LDL/HDL Ratio 12/02/2023 1.45     QT  Interval 12/02/2023 413     QTC Interval 12/02/2023 503     QT Interval 12/03/2023 430     QTC Interval 12/03/2023 538     QT Interval 12/04/2023 421     QTC Interval 12/04/2023 519    Office Visit on 11/30/2023   Component Date Value    Glucose 12/01/2023 112 (H)     BUN 12/01/2023 21     Creatinine 12/01/2023 1.18     Sodium 12/01/2023 141     Potassium 12/01/2023 5.1     Chloride 12/01/2023 105     CO2 12/01/2023 29.0     Calcium 12/01/2023 9.8     Total Protein 12/01/2023 6.6     Albumin 12/01/2023 4.5     ALT (SGPT) 12/01/2023 28     AST (SGOT) 12/01/2023 16     Alkaline Phosphatase 12/01/2023 63     Total Bilirubin 12/01/2023 1.2     Globulin 12/01/2023 2.1     A/G Ratio 12/01/2023 2.1     BUN/Creatinine Ratio 12/01/2023 17.8     Anion Gap 12/01/2023 7.0     eGFR 12/01/2023 67.2     Protime 12/01/2023 11.6     INR 12/01/2023 1.07     WBC 12/01/2023 6.80     RBC 12/01/2023 4.67     Hemoglobin 12/01/2023 14.9     Hematocrit 12/01/2023 43.7     MCV 12/01/2023 93.6     MCH 12/01/2023 31.9     MCHC 12/01/2023 34.1     RDW 12/01/2023 14.0     RDW-SD 12/01/2023 48.1     MPV 12/01/2023 7.8     Platelets 12/01/2023 197     Neutrophil % 12/01/2023 54.5     Lymphocyte % 12/01/2023 36.3     Monocyte % 12/01/2023 8.1     Eosinophil % 12/01/2023 0.7     Basophil % 12/01/2023 0.4     Neutrophils, Absolute 12/01/2023 3.70     Lymphocytes, Absolute 12/01/2023 2.50     Monocytes, Absolute 12/01/2023 0.60     Eosinophils, Absolute 12/01/2023 0.00     Basophils, Absolute 12/01/2023 0.00     nRBC 12/01/2023 0.0    Hospital Outpatient Visit on 11/24/2023   Component Date Value    BH CV STRESS PROTOCOL 1 11/24/2023 Pharmacologic     Stage 1 11/24/2023 1.0     HR Stage 1 11/24/2023 99     BP Stage 1 11/24/2023 116/84     Duration Min Stage 1 11/24/2023 0     Duration Sec Stage 1 11/24/2023 10     Stress Dose Regadenoson * 11/24/2023 0.40     Stress Comments Stage 1 11/24/2023 10 sec bolus injection     Baseline HR 11/24/2023 98      Baseline BP 11/24/2023 113/79     Peak HR 11/24/2023 128     Peak BP 11/24/2023 134/83     Recovery HR 11/24/2023 110     Recovery BP 11/24/2023 117/80     Target HR (85%) 11/24/2023 129     Max. Pred. HR (100%) 11/24/2023 152     Percent Max Pred HR 11/24/2023 84.21     Percent Target HR 11/24/2023 99     BH CV REST NUCLEAR ISOTO* 11/24/2023 10.0     BH CV STRESS NUCLEAR ISO* 11/24/2023 33.0     Nuc Stress EF 11/24/2023 22    Hospital Outpatient Visit on 11/24/2023   Component Date Value    LVIDd 11/24/2023 5.2     LVIDs 11/24/2023 4.5     IVSd 11/24/2023 0.99     LVPWd 11/24/2023 0.99     FS 11/24/2023 14.2     IVS/LVPW 11/24/2023 1.00     ESV(cubed) 11/24/2023 90.8     LV Sys Vol (BSA correcte* 11/24/2023 54.0     EDV(cubed) 11/24/2023 144.0     LV Wright Vol (BSA correct* 11/24/2023 64.2     LV mass(C)d 11/24/2023 193.3     LVOT area 11/24/2023 3.1     LVOT diam 11/24/2023 1.98     EDV(MOD-sp4) 11/24/2023 149.6     ESV(MOD-sp4) 11/24/2023 125.9     SV(MOD-sp4) 11/24/2023 23.7     SI(MOD-sp4) 11/24/2023 10.2     EF(MOD-sp4) 11/24/2023 15.9     MV E max belia 11/24/2023 81.4     SV(LVOT) 11/24/2023 49.2     RVIDd 11/24/2023 5.1     LV V1 max 11/24/2023 87.8     LV V1 max PG 11/24/2023 3.1     LV V1 mean PG 11/24/2023 1.75     LV V1 VTI 11/24/2023 16.0     Ao pk belia 11/24/2023 116.7     Ao max PG 11/24/2023 5.5     Ao mean PG 11/24/2023 3.1     Ao V2 VTI 11/24/2023 20.1     CHEKO(I,D) 11/24/2023 2.44     MR max belia 11/24/2023 429.3     MR max PG 11/24/2023 73.7     TR max belia 11/24/2023 294.6     TR max PG 11/24/2023 34.9     RV V1 max PG 11/24/2023 3.2     RV V1 max 11/24/2023 89.9     RV V1 VTI 11/24/2023 17.5     PA V2 max 11/24/2023 112.8     PA acc time 11/24/2023 0.10     Ao root diam 11/24/2023 3.5     ACS 11/24/2023 2.14     EF(MOD-bp) 11/24/2023 16.0    Hospital Outpatient Visit on 10/18/2023   Component Date Value    Creatinine 10/18/2023 1.30     eGFR 10/18/2023 59.8 (L)    There may be more visits with  results that are not included.     Recent labs reviewed and interpreted for clinical significance and application            Level of Care:           Truong Ortiz MD  03/19/24  .

## 2024-03-20 ENCOUNTER — APPOINTMENT (OUTPATIENT)
Dept: CARDIAC REHAB | Facility: HOSPITAL | Age: 69
End: 2024-03-20
Payer: MEDICARE

## 2024-03-22 ENCOUNTER — TREATMENT (OUTPATIENT)
Dept: CARDIAC REHAB | Facility: HOSPITAL | Age: 69
End: 2024-03-22
Payer: MEDICARE

## 2024-03-22 DIAGNOSIS — Z95.5 STENTED CORONARY ARTERY: Primary | ICD-10-CM

## 2024-03-22 PROCEDURE — 93798 PHYS/QHP OP CAR RHAB W/ECG: CPT

## 2024-04-08 RX ORDER — ROSUVASTATIN CALCIUM 20 MG/1
20 TABLET, COATED ORAL DAILY
Qty: 90 TABLET | Refills: 0 | Status: SHIPPED | OUTPATIENT
Start: 2024-04-08

## 2024-04-16 ENCOUNTER — OFFICE VISIT (OUTPATIENT)
Dept: CARDIOLOGY | Facility: CLINIC | Age: 69
End: 2024-04-16
Payer: MEDICARE

## 2024-04-16 VITALS
SYSTOLIC BLOOD PRESSURE: 129 MMHG | BODY MASS INDEX: 36.53 KG/M2 | WEIGHT: 241 LBS | DIASTOLIC BLOOD PRESSURE: 75 MMHG | HEIGHT: 68 IN | HEART RATE: 55 BPM | OXYGEN SATURATION: 98 %

## 2024-04-16 DIAGNOSIS — I10 ESSENTIAL HYPERTENSION: ICD-10-CM

## 2024-04-16 DIAGNOSIS — I25.5 ISCHEMIC CARDIOMYOPATHY: ICD-10-CM

## 2024-04-16 DIAGNOSIS — I44.7 LEFT BUNDLE BRANCH BLOCK: ICD-10-CM

## 2024-04-16 DIAGNOSIS — I48.19 PERSISTENT ATRIAL FIBRILLATION: Primary | ICD-10-CM

## 2024-04-16 PROCEDURE — 1160F RVW MEDS BY RX/DR IN RCRD: CPT | Performed by: INTERNAL MEDICINE

## 2024-04-16 PROCEDURE — 99213 OFFICE O/P EST LOW 20 MIN: CPT | Performed by: INTERNAL MEDICINE

## 2024-04-16 PROCEDURE — 1159F MED LIST DOCD IN RCRD: CPT | Performed by: INTERNAL MEDICINE

## 2024-04-16 PROCEDURE — 3074F SYST BP LT 130 MM HG: CPT | Performed by: INTERNAL MEDICINE

## 2024-04-16 PROCEDURE — 3078F DIAST BP <80 MM HG: CPT | Performed by: INTERNAL MEDICINE

## 2024-04-16 NOTE — PROGRESS NOTES
"Progress note      Name: Dillon Aaron ADMIT: (Not on file)   : 1955  PCP: Tre Burkett MD    MRN: [id number] LOS: 0 days   AGE/SEX: 68 y.o. male  ROOM: Room/bed info not found     Chief Complaint   Patient presents with    Follow-up     F/u        Subjective       History of present illness  Dillon Aaronis a 68-year-old male patient who has coronary artery disease status post PCI to the LAD on 2023, chronic left bundle branch block, atrial fibrillation diagnosed in 2023. He underwent BLANCHE guided cardioversion on 2023. After that he underwent ablation on 2023.   She is doing well, denies any significant shortness of breath, no palpitations no lower extremity edema no syncopal episodes.    Past Medical History:   Diagnosis Date    Atrial fibrillation     Hyperlipidemia     Hypertension     MONICA (obstructive sleep apnea)     wears cpap    Rectal cancer     in remission       Family History   Problem Relation Age of Onset    Heart disease Mother     Heart disease Father      Social History     Tobacco Use    Smoking status: Never    Smokeless tobacco: Never   Vaping Use    Vaping status: Never Used   Substance Use Topics    Alcohol use: Not Currently     Comment: occasionally; \"a case of beer a year\"    Drug use: Never       Current Outpatient Medications:     apixaban (ELIQUIS) 5 MG tablet tablet, Take 1 tablet by mouth Every 12 (Twelve) Hours. Indications: Atrial Fibrillation, Disp: 180 tablet, Rfl: 1    clopidogrel (PLAVIX) 75 MG tablet, Take 1 tablet by mouth Daily., Disp: 30 tablet, Rfl: 6    finasteride (PROSCAR) 5 MG tablet, Take 1 tablet by mouth Daily., Disp: , Rfl:     finasteride (PROSCAR) 5 MG tablet, Take 1 tablet by mouth Daily., Disp: 90 tablet, Rfl: 3    metoprolol succinate XL (TOPROL-XL) 25 MG 24 hr tablet, Take 0.5 tablets by mouth Daily., Disp: 30 tablet, Rfl: 11    nitroglycerin (NITROSTAT) 0.4 MG SL tablet, Place 1 tablet under the tongue Every 5 (Five) Minutes " As Needed for Chest Pain (Systolic BP Greater Than 100). Take no more than 3 doses in 15 minutes., Disp: 100 tablet, Rfl: 12    rosuvastatin (CRESTOR) 20 MG tablet, TAKE 1 TABLET BY MOUTH EVERY DAY, Disp: 90 tablet, Rfl: 0    sacubitril-valsartan (ENTRESTO) 24-26 MG tablet, Take 1 tablet by mouth Every 12 (Twelve) Hours., Disp: 60 tablet, Rfl: 3    vitamin D3 125 MCG (5000 UT) capsule capsule, Take 1 capsule by mouth Daily., Disp: , Rfl:   Allergies:  Adhesive tape      Physical Exam  VITALS REVIEWED    General:      well developed, in no acute distress.    Head:      normocephalic and atraumatic.    Eyes:      PERRL/EOM intact, conjunctiva and sclera clear with out nystagmus.    Neck:      no masses, thyromegaly,  trachea central with normal respiratory effort and PMI displaced laterally  Lungs:      Clear to auscultation bilaterally  Heart:       Regular rate and rhythm  Msk:      no deformity or scoliosis noted of thoracic or lumbar spine.    Pulses:      pulses normal in all 4 extremities.    Extremities:       No lower extremity edema  Neurologic:      no focal deficits.   alert oriented x3  Skin:      intact without lesions or rashes.    Psych:      alert and cooperative; normal mood and affect; normal attention span and concentration.      Result Review :               Pertinent cardiac workup    Heart cath 12/1/2023 90% LAD disease, PCI.  Echo 11/27/2023 ejection fraction 15 to 20%  Echo 2/28/2024 ejection fraction 45 to 50%      Procedures        Assessment and Plan      Dillon Hazel a 68-year-old male patient who has complex cardiac issues including coronary artery disease status post PCI to the LAD, also has atrial fibrillation for which he received cardioversion in early December followed by A-fib ablation on 12/28/2023.   Patient has been maintaining sinus rhythm.  His amiodarone was decreased to 100 mg, he still complaining of tremors from it.  I will go ahead and discontinue.  As far as his  cardiomyopathy, his EF has improved some, he is out of ICD range.  He still has left bundle branch block of course and has septal dyskinesia.  As of now however he does not qualify for an ICD.  If patient has recurrence of A-fib, then we can readdress device therapy versus antiarrhythmics or redo ablation.  I will see him in 1 year.    Diagnoses and all orders for this visit:    1. Persistent atrial fibrillation (Primary)    2. Ischemic cardiomyopathy    3. Left bundle branch block    4. Essential hypertension           Return in about 1 year (around 4/16/2025).  Patient was given instructions and counseling regarding his condition or for health maintenance advice. Please see specific information pulled into the AVS if appropriate.

## 2024-06-05 ENCOUNTER — OFFICE VISIT (OUTPATIENT)
Dept: CARDIOLOGY | Facility: CLINIC | Age: 69
End: 2024-06-05
Payer: MEDICARE

## 2024-06-05 VITALS
OXYGEN SATURATION: 96 % | BODY MASS INDEX: 37.59 KG/M2 | RESPIRATION RATE: 18 BRPM | HEART RATE: 62 BPM | SYSTOLIC BLOOD PRESSURE: 116 MMHG | HEIGHT: 68 IN | WEIGHT: 248 LBS | DIASTOLIC BLOOD PRESSURE: 71 MMHG

## 2024-06-05 DIAGNOSIS — I42.8 NICM (NONISCHEMIC CARDIOMYOPATHY): Primary | ICD-10-CM

## 2024-06-05 PROCEDURE — 3074F SYST BP LT 130 MM HG: CPT | Performed by: INTERNAL MEDICINE

## 2024-06-05 PROCEDURE — 99214 OFFICE O/P EST MOD 30 MIN: CPT | Performed by: INTERNAL MEDICINE

## 2024-06-05 PROCEDURE — 3078F DIAST BP <80 MM HG: CPT | Performed by: INTERNAL MEDICINE

## 2024-06-05 PROCEDURE — G2211 COMPLEX E/M VISIT ADD ON: HCPCS | Performed by: INTERNAL MEDICINE

## 2024-06-07 ENCOUNTER — TELEPHONE (OUTPATIENT)
Dept: CARDIOLOGY | Facility: CLINIC | Age: 69
End: 2024-06-07
Payer: MEDICARE

## 2024-06-07 RX ORDER — EZETIMIBE 10 MG/1
10 TABLET ORAL DAILY
Qty: 90 TABLET | Refills: 3 | Status: SHIPPED | OUTPATIENT
Start: 2024-06-07

## 2024-06-07 NOTE — TELEPHONE ENCOUNTER
Caller: Dillon Aaron    Relationship: Self    Best call back number: [phone]    What was the call regarding: PT STATES DURING HIS VISIT ON 6.5.24 WITH DR GOODEN HE WAS TOLD DR GOODEN WAS CALLING HIM IN A MEDICATION. NO MED HAS BEEN CALLED IN FOR PT. HE USES [facility]. PLEASE ADVISE.    Is it okay if the provider responds through MyChart: CALL PT BACK ONCE MEDICATION HAS BEEN CALLED IN.

## 2024-06-18 NOTE — PROGRESS NOTES
Cardiology Clinic Note  Truong Ortiz MD, PhD    Subjective:     Encounter Date:06/05/2024      Patient ID: Dillon Aaron is a 68 y.o. male.    Chief Complaint:  Chief Complaint   Patient presents with    Follow-up       HPI:        Previously I had the pleasure to see this very pleasant 68-year-old gentleman in clinic.    He has history of left bundle branch block as well as essential hypertension and paroxysmal atrial fibrillation.  He had previous abnormal stress test ordered for significant dyspnea on exertion and atypical symptoms which demonstrated ischemia in the septum.   He underwent invasive ischemic evaluation demonstrating 60% mid LAD disease compromising ostial proximal diagonal and the continuation LAD, FFR revealed borderline hemodynamic status with 0.91 value not meeting criteria for intervention, this is been managed medically, he was cleared for hip surgery and completed this successfully..   He has been followed intermittently with paroxysmal nature of his atrial fibrillation as well as CV comorbidities and CAD.  he has [id number] score of 3, historically,  December 2023 work-up revealed abnormal stress test with anterior apical and septal reversibility concerning for progression of LAD and diagonal disease, December 2023 echo with newly reduced EF of 20% compared to 50% last year with need for invasive ischemic evaluation to evaluate LAD diagonal which underwent bifurcation PCI as documented below December 2023.  Also EP evaluation for more persistent atrial fibrillation which may be contributing to reduced EF in addition to left bundle branch block and dyssynchrony.  He underwent DC cardioversion followed by ablation for restoration of sinus rhythm.      On f/u angina free, CARNES stable, vol ok, no syncope. We discussed GDMT extensively, f/u echo for eval of EF improvement.    Diet exercise per AHA guidelines recommended  Mediterranean diet low-cholesterol        Review of systems negative for 14  point review of systems except as mentioned above     Historical data copied forward from previous encounters in EMR including the history, exam, and assessment/plan has been reviewed and is unchanged unless noted otherwise.     Cardiac medicines reviewed with risk, benefits, and necessity of each discussed.     Risk and benefit of cardiac catheterization i reviewed including death heart attack stroke pain bleeding infection need for vascular /cardiovascular surgery were discussed and the patient         Reviewed below     Physical Exam  Regular rate and rhythm no rubs murmurs gallops  No heave no lift  No clubbing cyanosis or edema  Clear to auscultation  Normocephalic atraumatic pupils equal round  Intact grossly  Soft nontender nondistended  Normal pulses normal cap refill  No carotid bruits or JVD  Unchanged from prior encounter     Assessment:         Coronary artery disease  Cardiomyopathy with newly reduced EF 20% compared to 50% previously last year  Abnormal stress test with reversibility at the apex and septum  December 2023 shockwave balloon lithotripsy to the mid LAD diagonal bifurcation 2.5 x 12 utilization of 80 pulses circumferential calcium at least 180 degrees  3.,  Bifurcation PCI, 2.5 x 12 Xience drug-eluting stent postdilated at 20 dinh to the ostial proximal diagonal branch at 3 mm  4.,  LAD PCI 2.5 x 28 Xience drug-eluting stent in the midportion spanning the diagonal bifurcation, deployment at 20 dinh postdilatation 3.25 x 12 NC balloon at 18 to 20 dinh and distal to proximal fashion  Crestor 20 will continue  Goal LDL less than 70 optimally less than 55     Hyperlipidemia, as above     Recurrent A-fib RVR  Status post BLANCHE cardioversion followed by A-fib ablation December 2023  Continue Xarelto  Amiodarone 200 daily  Metoprolol 12.5 daily     Combined ischemic and nonischemic cardiomyopathy  Status post revascularization  Decline in EF possilby related to A-fib RVR recurrence of arrhythmia  Now  "maintaining sinus rhythm  Beta-blocker on board  Continue Entresto      dyspnea on exertion, secondary to the above      Back in 6 months, echo prior to visit for titration of GDMT with NICM        Objective:         /71 (BP Location: Right arm, Patient Position: Sitting)   Pulse 62   Resp 18   Ht 172.7 cm (68\")   Wt 112 kg (248 lb)   SpO2 96%   BMI 37.71 kg/m²     Diagnoses and all orders for this visit:    1. NICM (nonischemic cardiomyopathy) (Primary)  -     Adult Transthoracic Echo Complete W/ Color, Spectral and Contrast if Necessary Per Protocol; Future                 The pleasure to be involved in this patient's cardiovascular care.  Please call with any questions or concerns  Truong Ortiz MD, PhD    Most recent EKG as reviewed and interpreted by me:  Procedures     Most recent echo as reviewed and interpreted by me:  Results for orders placed during the hospital encounter of 02/28/24    Adult Transthoracic Echo Complete W/ Color, Spectral and Contrast if Necessary Per Protocol    Interpretation Summary    Left ventricular systolic function is mildly decreased. Left ventricular ejection fraction appears to be 46 - 50%.    Left ventricular diastolic function is consistent with (grade Ia w/high LAP) impaired relaxation.    Estimated right ventricular systolic pressure from tricuspid regurgitation is normal (<35 mmHg).    Mildly reduced EF 45%, hypokinesis at apex c/w prior injury  Other segments thicken normally  Mild MR  Grade 1 diastolic dysfunction      Most recent stress test as reviewed and interpreted by me:  Results for orders placed during the hospital encounter of 11/24/23    Stress Test With Myocardial Perfusion One Day    Interpretation Summary    Left ventricular ejection fraction is severely reduced (Calculated EF = 22%).    Abnormal LV wall motion consistent with severe hypokinesis.    Myocardial perfusion imaging indicates a moderate-sized infarct located in the anterior wall, " septal wall and apex with moderate brad-infarct ischemia.    Impressions are consistent with a high risk study.    There is no prior study available for comparison. Resting and stress images were compared Resting images demonstrate decreased counts in the mid to apical anterior wall and anteroseptum consistent with prior MI Stress images demonstrate worsening of brad-infarct ischemia at least moderate in the anteroseptum, anterior wall apex and inferoseptum.  EF 22%, dilated ventricle  mL Abnormal study.    Findings consistent with an equivocal ECG stress test.    Abnormal study  Moderate brad-infarct ischemia in the septum anterior wall and apex  Severely reduced EF 22%  Underlying atrial fibrillation with left bundle branch block  Dilated ventricle  Intermediate/ high risk study      Most recent cardiac catheterization as reviewed interpreted by me:  Results for orders placed during the hospital encounter of 12/01/23    Cardiac Catheterization/Vascular Study    Conclusion  Primary operative Truong Ortiz MD, PhD  [facility] cardiology  Date of service 12-1-2023    Procedure  1.  Left heart catheterization coronary angiography left ventriculography on MENESES position  2.  Shockwave balloon lithotripsy to the mid LAD diagonal bifurcation 2.5 x 12 utilization of 80 pulses circumferential calcium at least 180 degrees  3.,  Bifurcation PCI, 2.5 x 12 Xience drug-eluting stent postdilated at 20 dinh to the ostial proximal diagonal branch at 3 mm  4.,  LAD PCI 2.5 x 28 Xience drug-eluting stent in the midportion spanning the diagonal bifurcation, deployment at 20 dinh postdilatation 3.25 x 12 NC balloon at 18 to 20 dinh and distal to proximal fashion    Reduction of stenosis ostial diagonal branch 10% residual, less than 10% residual stenosis of LAD, HONORIO-3 flow pre and post, no distal wire trauma edge dissection or other complication    After informed consent patient brought to the Cath Lab sterilely prepped and  draped in usual fashion exposure of the right groin for right common femoral arterial access via micropuncture and modified Seldinger technique with placement of a 6 Tu[place] sheath.  A 3 5 guidewires advanced aortic valve followed by diagnostic JL 4 and JR4 catheters for selective left and right coronary angiography respectively.  JR4 was used to cross aortic valve followed by hand-injection for LV gram EDP assessment and pullback assessment of the transaortic valve gradient.  There was obstructive disease in the mid LAD at the diagonal bifurcation at least 80% to 90% lara 1/1/1 morphology with 180 degrees of circumferential calcium.  There was progression year-over-year from last year comparative and angiography.  He has a newly reduced EF of 20%, previously 50% with abnormal stress test demonstrating reversibility of the anterior wall apex and septum.  Decision made for intervention.  Patient patient heparinized with 100 units/kg of heparin with repeated boluses maintain ACT greater than 250 on background therapy of aspirin.  A 7 Tu[place]uan sheath was then upsized along with 7 Tu[place]uan EBU 4.0 guide to engage left main, run-through wire to the diagonal branch and a BMW wire to the LAD, given calcification and bifurcation morphology, shockwave balloon lithotripsy with 2.5 x 12 balloon was performed with 80 pulses in the LAD.  We attempted to perform also of the ostial proximal diagonal branch however secondary wire wrapped we were unable to advance the deployed shockwave balloon.  This was ultimately removed followed by predilation of the ostial proximal diagonal segment with 2.5 x 12 NC balloon at 18 dinh with full expansion, further predilation was also performed in the LAD similarly, a 2.5 x 12 Xience drug stent was positioned the ostial proximal diagonal branch and a 2.5 x 28 Xience negative stent in the mid LAD, the diagonal lumen was deployed at 16 dinh followed by pullback of the stent balloon for postdilatation at  20 to 22 dinh for 30 seconds with great angiographic results, mild stepdown distally but otherwise much improvement of the ostial proximal segment.  This was removed followed by deployment of the LAD stent at 20 dinh which was viewed to be slightly undersized with improved runoff after predilation and nitroglycerin.  This was reinflated 22 dinh followed by postdilatation with 3.25 x 12 noncompliant balloon at 18 dinh and distal to proximal fashion with great angiographic results, 10% residual stenosis of the lesion, 20% residual stenosis in the ostial proximal diagonal branch.  Proximal optimization was performed as well at 22 dinh above the diagonal takeoff with great results.  No distal wire trauma edge dissection or complications.  6 [place]vian Angio-Seal was used to close right, from arteriotomy with immediate hemostasis and maintenance of distal pulses.  He left Cath Lab chest pain-free hemodynamically electrically stable alert talkative staff neurologically gross intact bilaterally    Findings  1.  Opening aortic pressure of 109/62, closing pressure was unchanged  2.,  No LV gram performed to conserve contrast, LVEDP elevated 23-24  3.  Normal transorbital gradient on pullback    Angiography  1 left main normal no disease  2.  The LAD is a medium caliber vessel with 80 to 90% disease at the bifurcation including the ostial proximal diagonal branch which is also 80 to 90%.  Remainder the vessel diffuse luminal irregularities only 20%  3.  Circumflex large caliber nondominant vessel with minimal angiographic disease throughout less than 10%  Over 4 RCA is a large-caliber dominant vessel, diffuse luminal irregularities less than 20% throughout    Conclusions recommendations  1 successful bifurcation stenting, 10% residual stenosis in each vessel at the bifurcation status post shockwave balloon lithotripsy and high-pressure postdilatation and proximal optimization as described  2.  Aspirin and P2 Y12 B inhibitor will be  continued with cessation of aspirin in 30 days.  He will be back on anticoagulation given underlying atrial fibrillation    High intensity statin  Beta-blocker  Patient be admitted for rate control of atrial fibrillation with consult electrophysiology for consideration for elective DC cardioversion given severely reduced LV systolic function likely contributory by underlying atrial fibrillation and left bundle branch block with consideration for device placement with prior sinus bradycardia not amenable for beta-blockers previously essentially with tachybradycardia syndrome and likely combined ischemic nonischemic cardiomyopathy    Further recommendation follow findings and clinical course    Truong Ortiz MD, PhD    The following portions of the patient's history were reviewed and updated as appropriate: allergies, current medications, past family history, past medical history, past social history, past surgical history, and problem list.      ROS:  14 point review of systems negative except as mentioned above    Current Outpatient Medications:     apixaban (ELIQUIS) 5 MG tablet tablet, Take 1 tablet by mouth Every 12 (Twelve) Hours. Indications: Atrial Fibrillation, Disp: 180 tablet, Rfl: 1    clopidogrel (PLAVIX) 75 MG tablet, Take 1 tablet by mouth Daily., Disp: 30 tablet, Rfl: 6    finasteride (PROSCAR) 5 MG tablet, Take 1 tablet by mouth Daily., Disp: 90 tablet, Rfl: 3    metoprolol succinate XL (TOPROL-XL) 25 MG 24 hr tablet, Take 0.5 tablets by mouth Daily., Disp: 30 tablet, Rfl: 11    nitroglycerin (NITROSTAT) 0.4 MG SL tablet, Place 1 tablet under the tongue Every 5 (Five) Minutes As Needed for Chest Pain (Systolic BP Greater Than 100). Take no more than 3 doses in 15 minutes., Disp: 100 tablet, Rfl: 12    rosuvastatin (CRESTOR) 20 MG tablet, TAKE 1 TABLET BY MOUTH EVERY DAY, Disp: 90 tablet, Rfl: 0    sacubitril-valsartan (ENTRESTO) 24-26 MG tablet, Take 1 tablet by mouth Every 12 (Twelve) Hours., Disp:  "60 tablet, Rfl: 3    vitamin D3 125 MCG (5000 UT) capsule capsule, Take 1 capsule by mouth Daily., Disp: , Rfl:     ezetimibe (ZETIA) 10 MG tablet, Take 1 tablet by mouth Daily., Disp: 90 tablet, Rfl: 3    Problem List:  Patient Active Problem List   Diagnosis    Gastroenteritis, acute    Mixed hyperlipidemia    Essential hypertension    Left bundle branch block    Low back pain    Obesity    Paroxysmal atrial fibrillation    Abnormal stress test    Chest pain, atypical    CAD (coronary artery disease)    Ischemic cardiomyopathy    Persistent atrial fibrillation    Chest pain    Contact dermatitis    A-fib     Past Medical History:  Past Medical History:   Diagnosis Date    Atrial fibrillation     Hyperlipidemia     Hypertension     MONICA (obstructive sleep apnea)     wears cpap    Rectal cancer     in remission     Past Surgical History:    Social History:  Social History     Socioeconomic History    Marital status:    Tobacco Use    Smoking status: Never    Smokeless tobacco: Never   Vaping Use    Vaping status: Never Used   Substance and Sexual Activity    Alcohol use: Not Currently     Comment: occasionally; \"a case of beer a year\"    Drug use: Never    Sexual activity: Defer     Allergies:  Allergies   Allergen Reactions    Adhesive Tape Hives     Blisters     Immunizations:  Immunization History   Administered Date(s) Administered    COVID-19 (MODERNA) 1st,2nd,3rd Dose Monovalent 02/10/2021, 03/16/2021    Flu Vaccine Quad PF 6-35MO 09/24/2016, 10/20/2019    Fluad Quad 65+ 10/20/2020    Fluzone (or Fluarix & Flulaval for VFC) >6mos 10/20/2019    Hepatitis A 04/29/2018, 11/05/2018    Influenza Quad Vaccine (Inpatient) 10/28/2017    Influenza, Unspecified 01/08/2013, 11/24/2013, 10/24/2014, 11/05/2018    Pneumococcal Conjugate 13-Valent (PCV13) 09/24/2020    Zostavax 12/08/2016            In-Office Procedure(s):  No orders to display        ASCVD RIsk Score::  The ASCVD Risk score (Sloan KHAN, et al., 2019) " failed to calculate.    Imaging:    Results for orders placed during the hospital encounter of 12/06/23    XR Chest 1 View    Narrative  XR CHEST 1 VW    Date of Exam: 12/6/2023 5:32 PM EST    Indication: chest pain    Comparison: PE protocol chest and 2 from 10/18/2023, portable chest radiograph dated 12/17/2016    Findings:    The lungs are grossly clear. Cardiac, hilar, and mediastinal silhouettes are stable with moderate cardiomegaly. Pulmonary vascularity is within upper range of normal. No pneumothorax or pleural effusions. The trachea is midline.  No acute bony  abnormality or aggressive appearing focal osseous lesions in the visualized bony thorax.    Impression  Impression:  Stable cardiomegaly. No active cardiopulmonary disease.        Electronically Signed: Kishor Segovia DO  12/6/2023 5:47 PM EST  Workstation ID: [id number]       Results for orders placed during the hospital encounter of 10/18/23    CT Angiogram Chest Pulmonary Embolism    Narrative  CT ANGIOGRAM CHEST PULMONARY EMBOLISM    Date of Exam: 10/18/2023 4:54 PM EDT    Indication: SHORTNESS OF BREATH.    Comparison: Chest radiograph 12/17/2016.    Technique: Axial CT images were obtained of the chest after the uneventful intravenous administration of iodinated contrast utilizing pulmonary embolism protocol.  Sagittal and coronal reconstructions were performed.  Automated exposure control and  iterative reconstruction methods were used.      Findings:  Thyroid unremarkable. No supraclavicular adenopathy. Aortic atherosclerotic disease without aneurysm. Dilated main pulmonary artery up to 4.1 cm suggestive of pulmonary arterial hypertension. Negative for pulmonary embolism. Mild cardiomegaly. Moderate  calcified coronary atherosclerotic disease. No pericardial effusion.    No suspicious adenopathy. Esophagus grossly unremarkable. Multiple gallstones noted. Thickened adrenal glands without measurable nodule. Degenerative osteoarthritis of right  glenohumeral joint. Surgical changes in the left humerus. Diffuse idiopathic  skeletal hyperostosis. No acute displaced fracture or aggressive bone lesion.    Central airways patent. Mild nonspecific peribronchial thickening most notably in the lower lobes. Mild smooth interlobar septal thickening with trace right effusion. Negative for infectious infiltrate, suspicious nodule or pneumothorax.    Impression  Impression:  1. Negative for pulmonary embolism.  2. Dilated main pulmonary artery which can be seen with pulmonary arterial hypertension.  3. Cardiomegaly with mild interstitial edema and trace right effusion.  4. Moderate coronary atherosclerotic disease.  5. Cholelithiasis.        Electronically Signed: Madan Bishop MD  10/18/2023 5:14 PM EDT  Workstation ID: [id number]      Results for orders placed during the hospital encounter of 10/18/23    CT Angiogram Chest Pulmonary Embolism    Narrative  CT ANGIOGRAM CHEST PULMONARY EMBOLISM    Date of Exam: 10/18/2023 4:54 PM EDT    Indication: SHORTNESS OF BREATH.    Comparison: Chest radiograph 12/17/2016.    Technique: Axial CT images were obtained of the chest after the uneventful intravenous administration of iodinated contrast utilizing pulmonary embolism protocol.  Sagittal and coronal reconstructions were performed.  Automated exposure control and  iterative reconstruction methods were used.      Findings:  Thyroid unremarkable. No supraclavicular adenopathy. Aortic atherosclerotic disease without aneurysm. Dilated main pulmonary artery up to 4.1 cm suggestive of pulmonary arterial hypertension. Negative for pulmonary embolism. Mild cardiomegaly. Moderate  calcified coronary atherosclerotic disease. No pericardial effusion.    No suspicious adenopathy. Esophagus grossly unremarkable. Multiple gallstones noted. Thickened adrenal glands without measurable nodule. Degenerative osteoarthritis of right glenohumeral joint. Surgical changes in the left humerus.  Diffuse idiopathic  skeletal hyperostosis. No acute displaced fracture or aggressive bone lesion.    Central airways patent. Mild nonspecific peribronchial thickening most notably in the lower lobes. Mild smooth interlobar septal thickening with trace right effusion. Negative for infectious infiltrate, suspicious nodule or pneumothorax.    Impression  Impression:  1. Negative for pulmonary embolism.  2. Dilated main pulmonary artery which can be seen with pulmonary arterial hypertension.  3. Cardiomegaly with mild interstitial edema and trace right effusion.  4. Moderate coronary atherosclerotic disease.  5. Cholelithiasis.        Electronically Signed: Madan Bishop MD  10/18/2023 5:14 PM EDT  Workstation ID: [id number]      Lab Review:   Hospital Outpatient Visit on 02/28/2024   Component Date Value    LV GLOBAL STRAIN  02/28/2024 -12.4     LVIDd 02/28/2024 4.5     LVIDs 02/28/2024 3.4     IVSd 02/28/2024 1.10     LVPWd 02/28/2024 1.10     FS 02/28/2024 24.4     IVS/LVPW 02/28/2024 1.00     ESV(cubed) 02/28/2024 39.3     LV Sys Vol (BSA correcte* 02/28/2024 40.3     EDV(cubed) 02/28/2024 91.1     LV Wright Vol (BSA correct* 02/28/2024 74.2     LV mass(C)d 02/28/2024 175.0     LVOT area 02/28/2024 3.8     LVOT diam 02/28/2024 2.20     EDV(MOD-sp4) 02/28/2024 166.0     ESV(MOD-sp4) 02/28/2024 90.1     SV(MOD-sp4) 02/28/2024 75.9     SVi(MOD-SP4) 02/28/2024 33.9     EF(MOD-sp4) 02/28/2024 45.7     MV E max belia 02/28/2024 59.7     MV A max belia 02/28/2024 80.2     MV dec time 02/28/2024 0.29     MV E/A 02/28/2024 0.74     Med Peak E' Belia 02/28/2024 7.0     Lat Peak E' Belia 02/28/2024 7.2     Avg E/e' ratio 02/28/2024 8.41     SV(LVOT) 02/28/2024 94.3     RVIDd 02/28/2024 4.9     TAPSE (>1.6) 02/28/2024 2.14     RV S' 02/28/2024 10.8     LA dimension (2D)  02/28/2024 3.9     LV V1 max 02/28/2024 118.0     LV V1 max PG 02/28/2024 5.6     LV V1 mean PG 02/28/2024 3.0     LV V1 VTI 02/28/2024 24.8     Ao pk belia 02/28/2024  148.0     Ao max PG 02/28/2024 8.8     Ao mean PG 02/28/2024 5.0     Ao V2 VTI 02/28/2024 31.6     CHEKO(I,D) 02/28/2024 3.0     MV max PG 02/28/2024 3.4     MV mean PG 02/28/2024 1.00     MV V2 VTI 02/28/2024 29.6     MV P1/2t 02/28/2024 123.1     MVA(P1/2t) 02/28/2024 1.79     MVA(VTI) 02/28/2024 3.2     MV dec slope 02/28/2024 149.0     RV V1 max PG 02/28/2024 2.50     RV V1 max 02/28/2024 79.0     RV V1 VTI 02/28/2024 18.0     PA V2 max 02/28/2024 114.0     PA acc time 02/28/2024 0.12     ACS 02/28/2024 2.20     Sinus 02/28/2024 3.4     STJ 02/28/2024 2.6    Admission on 12/28/2023, Discharged on 12/29/2023   Component Date Value    Activated Clotting Time  12/28/2023 331 (H)     Activated Clotting Time  12/28/2023 320 (H)     Activated Clotting Time  12/28/2023 363 (H)     Activated Clotting Time  12/28/2023 320 (H)    Lab on 12/26/2023   Component Date Value    Protime 12/26/2023 10.9     INR 12/26/2023 1.00     Glucose 12/26/2023 110 (H)     BUN 12/26/2023 15     Creatinine 12/26/2023 1.26     Sodium 12/26/2023 142     Potassium 12/26/2023 4.6     Chloride 12/26/2023 106     CO2 12/26/2023 22.8     Calcium 12/26/2023 9.7     Total Protein 12/26/2023 6.6     Albumin 12/26/2023 4.6     ALT (SGPT) 12/26/2023 29     AST (SGOT) 12/26/2023 13     Alkaline Phosphatase 12/26/2023 75     Total Bilirubin 12/26/2023 0.7     Globulin 12/26/2023 2.0     A/G Ratio 12/26/2023 2.3     BUN/Creatinine Ratio 12/26/2023 11.9     Anion Gap 12/26/2023 13.2     eGFR 12/26/2023 62.1     Magnesium 12/26/2023 2.3     WBC 12/26/2023 7.43     RBC 12/26/2023 5.09     Hemoglobin 12/26/2023 16.2     Hematocrit 12/26/2023 47.6     MCV 12/26/2023 93.5     MCH 12/26/2023 31.8     MCHC 12/26/2023 34.0     RDW 12/26/2023 13.1     RDW-SD 12/26/2023 44.8     MPV 12/26/2023 10.3     Platelets 12/26/2023 195     Neutrophil % 12/26/2023 62.8     Lymphocyte % 12/26/2023 28.9     Monocyte % 12/26/2023 6.5     Eosinophil % 12/26/2023 1.1     Basophil %  12/26/2023 0.4     Immature Grans % 12/26/2023 0.3     Neutrophils, Absolute 12/26/2023 4.67     Lymphocytes, Absolute 12/26/2023 2.15     Monocytes, Absolute 12/26/2023 0.48     Eosinophils, Absolute 12/26/2023 0.08     Basophils, Absolute 12/26/2023 0.03     Immature Grans, Absolute 12/26/2023 0.02     nRBC 12/26/2023 0.0    Admission on 12/06/2023, Discharged on 12/07/2023   Component Date Value    QT Interval 12/06/2023 453     QTC Interval 12/06/2023 482     Glucose 12/06/2023 106 (H)     BUN 12/06/2023 25 (H)     Creatinine 12/06/2023 1.11     Sodium 12/06/2023 140     Potassium 12/06/2023 4.5     Chloride 12/06/2023 104     CO2 12/06/2023 27.0     Calcium 12/06/2023 9.5     BUN/Creatinine Ratio 12/06/2023 22.5     Anion Gap 12/06/2023 9.0     eGFR 12/06/2023 72.3     Protime 12/06/2023 10.9     INR 12/06/2023 1.00     PTT 12/06/2023 26.4 (L)     HS Troponin T 12/06/2023 153 (C)     WBC 12/06/2023 5.70     RBC 12/06/2023 4.49     Hemoglobin 12/06/2023 14.4     Hematocrit 12/06/2023 42.9     MCV 12/06/2023 95.6     MCH 12/06/2023 32.0     MCHC 12/06/2023 33.5     RDW 12/06/2023 14.3     RDW-SD 12/06/2023 47.3     MPV 12/06/2023 7.8     Platelets 12/06/2023 198     Neutrophil % 12/06/2023 52.6     Lymphocyte % 12/06/2023 34.0     Monocyte % 12/06/2023 10.3     Eosinophil % 12/06/2023 2.6     Basophil % 12/06/2023 0.5     Neutrophils, Absolute 12/06/2023 3.00     Lymphocytes, Absolute 12/06/2023 2.00     Monocytes, Absolute 12/06/2023 0.60     Eosinophils, Absolute 12/06/2023 0.10     Basophils, Absolute 12/06/2023 0.00     nRBC 12/06/2023 0.0     Extra Tube 12/06/2023 Hold for add-ons.     HS Troponin T 12/06/2023 119 (C)     Troponin T Delta 12/06/2023 -34 (L)     Glucose 12/07/2023 108 (H)     BUN 12/07/2023 17     Creatinine 12/07/2023 1.02     Sodium 12/07/2023 137     Potassium 12/07/2023 4.6     Chloride 12/07/2023 101     CO2 12/07/2023 25.0     Calcium 12/07/2023 9.3     BUN/Creatinine Ratio 12/07/2023  16.7     Anion Gap 12/07/2023 11.0     eGFR 12/07/2023 80.1     WBC 12/07/2023 5.40     RBC 12/07/2023 4.81     Hemoglobin 12/07/2023 15.1     Hematocrit 12/07/2023 45.2     MCV 12/07/2023 94.1     MCH 12/07/2023 31.4     MCHC 12/07/2023 33.3     RDW 12/07/2023 14.2     RDW-SD 12/07/2023 48.1     MPV 12/07/2023 8.3     Platelets 12/07/2023 171     Neutrophil % 12/07/2023 60.1     Lymphocyte % 12/07/2023 28.6     Monocyte % 12/07/2023 8.4     Eosinophil % 12/07/2023 2.2     Basophil % 12/07/2023 0.7     Neutrophils, Absolute 12/07/2023 3.30     Lymphocytes, Absolute 12/07/2023 1.60     Monocytes, Absolute 12/07/2023 0.50     Eosinophils, Absolute 12/07/2023 0.10     Basophils, Absolute 12/07/2023 0.00     nRBC 12/07/2023 0.1      Recent labs reviewed and interpreted for clinical significance and application            Level of Care:           Truong Ortiz MD  06/18/24  .

## 2024-07-08 RX ORDER — ROSUVASTATIN CALCIUM 20 MG/1
20 TABLET, COATED ORAL DAILY
Qty: 90 TABLET | Refills: 0 | Status: SHIPPED | OUTPATIENT
Start: 2024-07-08

## 2024-07-08 NOTE — TELEPHONE ENCOUNTER
Caller: Dillon Aaron    Relationship: Self    Best call back number: [phone]    Requested Prescriptions:   Requested Prescriptions     Pending Prescriptions Disp Refills    rosuvastatin (CRESTOR) 20 MG tablet 90 tablet 0     Sig: Take 1 tablet by mouth Daily.        Pharmacy where request should be sent: [facility]     Last office visit with prescribing clinician: 6/5/2024   Last telemedicine visit with prescribing clinician: Visit date not found   Next office visit with prescribing clinician: 12/16/2024     Additional details provided by patient: PATIENT HAS 4 DAYS OF MEDICATION.    Does the patient have less than a 3 day supply:  [] Yes  [x] No    Would you like a call back once the refill request has been completed: [] Yes [] No    If the office needs to give you a call back, can they leave a voicemail: [] Yes [] No    Nubia Goldstein Rep   07/08/24 09:21 EDT

## 2024-07-09 RX ORDER — CLOPIDOGREL BISULFATE 75 MG/1
75 TABLET ORAL DAILY
Qty: 30 TABLET | Refills: 6 | Status: SHIPPED | OUTPATIENT
Start: 2024-07-09

## 2024-10-07 RX ORDER — ROSUVASTATIN CALCIUM 20 MG/1
20 TABLET, COATED ORAL DAILY
Qty: 90 TABLET | Refills: 0 | Status: SHIPPED | OUTPATIENT
Start: 2024-10-07

## 2024-11-05 ENCOUNTER — HOSPITAL ENCOUNTER (OUTPATIENT)
Dept: CARDIOLOGY | Facility: HOSPITAL | Age: 69
Discharge: HOME | End: 2024-11-05
Admitting: INTERNAL MEDICINE
Payer: MEDICARE

## 2024-11-05 VITALS
SYSTOLIC BLOOD PRESSURE: 132 MMHG | BODY MASS INDEX: 37.59 KG/M2 | DIASTOLIC BLOOD PRESSURE: 79 MMHG | HEIGHT: 68 IN | WEIGHT: 248 LBS

## 2024-11-05 DIAGNOSIS — I42.8 NICM (NONISCHEMIC CARDIOMYOPATHY): ICD-10-CM

## 2024-11-05 LAB
BH CV ECHO LEFT VENTRICLE GLOBAL LONGITUDINAL STRAIN: -13.2 %
BH CV ECHO MEAS - AO MAX PG: 8.1 MMHG
BH CV ECHO MEAS - AO MEAN PG: 4 MMHG
BH CV ECHO MEAS - AO ROOT DIAM: 3.8 CM
BH CV ECHO MEAS - AO V2 MAX: 142 CM/SEC
BH CV ECHO MEAS - AO V2 VTI: 30.1 CM
BH CV ECHO MEAS - AVA(I,D): 1.58 CM2
BH CV ECHO MEAS - EDV(CUBED): 85.2 ML
BH CV ECHO MEAS - EDV(MOD-SP4): 152 ML
BH CV ECHO MEAS - EF(MOD-BP): 50 %
BH CV ECHO MEAS - EF(MOD-SP4): 50.1 %
BH CV ECHO MEAS - ESV(CUBED): 42.9 ML
BH CV ECHO MEAS - ESV(MOD-SP4): 75.8 ML
BH CV ECHO MEAS - FS: 20.5 %
BH CV ECHO MEAS - IVS/LVPW: 1 CM
BH CV ECHO MEAS - IVSD: 1.1 CM
BH CV ECHO MEAS - LA DIMENSION: 4.2 CM
BH CV ECHO MEAS - LAT PEAK E' VEL: 7.6 CM/SEC
BH CV ECHO MEAS - LV DIASTOLIC VOL/BSA (35-75): 67.9 CM2
BH CV ECHO MEAS - LV MASS(C)D: 168.9 GRAMS
BH CV ECHO MEAS - LV MAX PG: 2.5 MMHG
BH CV ECHO MEAS - LV MEAN PG: 1 MMHG
BH CV ECHO MEAS - LV SYSTOLIC VOL/BSA (12-30): 33.8 CM2
BH CV ECHO MEAS - LV V1 MAX: 79.7 CM/SEC
BH CV ECHO MEAS - LV V1 VTI: 13.7 CM
BH CV ECHO MEAS - LVIDD: 4.4 CM
BH CV ECHO MEAS - LVIDS: 3.5 CM
BH CV ECHO MEAS - LVOT AREA: 3.5 CM2
BH CV ECHO MEAS - LVOT DIAM: 2.1 CM
BH CV ECHO MEAS - LVPWD: 1.1 CM
BH CV ECHO MEAS - MED PEAK E' VEL: 5.1 CM/SEC
BH CV ECHO MEAS - MR MAX PG: 87.2 MMHG
BH CV ECHO MEAS - MR MAX VEL: 467 CM/SEC
BH CV ECHO MEAS - MR MEAN PG: 58 MMHG
BH CV ECHO MEAS - MR MEAN VEL: 363 CM/SEC
BH CV ECHO MEAS - MR VTI: 175 CM
BH CV ECHO MEAS - MV A MAX VEL: 83.3 CM/SEC
BH CV ECHO MEAS - MV DEC SLOPE: 287 CM/SEC2
BH CV ECHO MEAS - MV DEC TIME: 0.2 SEC
BH CV ECHO MEAS - MV E MAX VEL: 63.9 CM/SEC
BH CV ECHO MEAS - MV E/A: 0.77
BH CV ECHO MEAS - MV MAX PG: 2.6 MMHG
BH CV ECHO MEAS - MV MEAN PG: 1 MMHG
BH CV ECHO MEAS - MV P1/2T: 77.6 MSEC
BH CV ECHO MEAS - MV V2 VTI: 29.6 CM
BH CV ECHO MEAS - MVA(P1/2T): 2.8 CM2
BH CV ECHO MEAS - MVA(VTI): 1.6 CM2
BH CV ECHO MEAS - PA V2 MAX: 111 CM/SEC
BH CV ECHO MEAS - PULM A REVS DUR: 0.11 SEC
BH CV ECHO MEAS - PULM A REVS VEL: 35.1 CM/SEC
BH CV ECHO MEAS - PULM DIAS VEL: 48 CM/SEC
BH CV ECHO MEAS - PULM S/D: 1.72
BH CV ECHO MEAS - PULM SYS VEL: 82.7 CM/SEC
BH CV ECHO MEAS - RAP SYSTOLE: 8 MMHG
BH CV ECHO MEAS - RV MAX PG: 1.51 MMHG
BH CV ECHO MEAS - RV V1 MAX: 61.5 CM/SEC
BH CV ECHO MEAS - RV V1 VTI: 13.6 CM
BH CV ECHO MEAS - RVSP: 37.4 MMHG
BH CV ECHO MEAS - SV(LVOT): 47.5 ML
BH CV ECHO MEAS - SV(MOD-SP4): 76.2 ML
BH CV ECHO MEAS - SVI(LVOT): 21.2 ML/M2
BH CV ECHO MEAS - SVI(MOD-SP4): 34 ML/M2
BH CV ECHO MEAS - TAPSE (>1.6): 2.38 CM
BH CV ECHO MEAS - TR MAX PG: 29.4 MMHG
BH CV ECHO MEAS - TR MAX VEL: 271 CM/SEC
BH CV ECHO MEASUREMENTS AVERAGE E/E' RATIO: 10.06
BH CV XLRA - TDI S': 11.3 CM/SEC
LEFT ATRIUM VOLUME INDEX: 27.4 ML/M2
SINUS: 3.6 CM

## 2024-11-05 PROCEDURE — 93306 TTE W/DOPPLER COMPLETE: CPT | Performed by: INTERNAL MEDICINE

## 2024-11-05 PROCEDURE — 93356 MYOCRD STRAIN IMG SPCKL TRCK: CPT

## 2024-11-05 PROCEDURE — 93306 TTE W/DOPPLER COMPLETE: CPT

## 2024-11-05 PROCEDURE — 93356 MYOCRD STRAIN IMG SPCKL TRCK: CPT | Performed by: INTERNAL MEDICINE

## 2024-11-21 ENCOUNTER — HOSPITAL ENCOUNTER (EMERGENCY)
Facility: HOSPITAL | Age: 69
Discharge: HOME | End: 2024-11-21
Attending: EMERGENCY MEDICINE
Payer: MEDICARE

## 2024-11-21 ENCOUNTER — APPOINTMENT (OUTPATIENT)
Dept: CT IMAGING | Facility: HOSPITAL | Age: 69
End: 2024-11-21
Payer: MEDICARE

## 2024-11-21 VITALS
HEIGHT: 72 IN | RESPIRATION RATE: 18 BRPM | SYSTOLIC BLOOD PRESSURE: 147 MMHG | DIASTOLIC BLOOD PRESSURE: 79 MMHG | WEIGHT: 246.47 LBS | BODY MASS INDEX: 33.38 KG/M2 | HEART RATE: 69 BPM | TEMPERATURE: 97.8 F | OXYGEN SATURATION: 96 %

## 2024-11-21 DIAGNOSIS — R51.9 NONINTRACTABLE HEADACHE, UNSPECIFIED CHRONICITY PATTERN, UNSPECIFIED HEADACHE TYPE: Primary | ICD-10-CM

## 2024-11-21 DIAGNOSIS — J32.0 LEFT MAXILLARY SINUSITIS: ICD-10-CM

## 2024-11-21 LAB
ALBUMIN SERPL-MCNC: 4.6 G/DL (ref 3.5–5.2)
ALBUMIN/GLOB SERPL: 1.9 G/DL
ALP SERPL-CCNC: 81 U/L (ref 39–117)
ALT SERPL W P-5'-P-CCNC: 48 U/L (ref 1–41)
ANION GAP SERPL CALCULATED.3IONS-SCNC: 9.3 MMOL/L (ref 5–15)
AST SERPL-CCNC: 22 U/L (ref 1–40)
BASOPHILS # BLD AUTO: 0.02 10*3/MM3 (ref 0–0.2)
BASOPHILS NFR BLD AUTO: 0.2 % (ref 0–1.5)
BILIRUB SERPL-MCNC: 0.6 MG/DL (ref 0–1.2)
BUN SERPL-MCNC: 15 MG/DL (ref 8–23)
BUN/CREAT SERPL: 14 (ref 7–25)
CALCIUM SPEC-SCNC: 9.7 MG/DL (ref 8.6–10.5)
CHLORIDE SERPL-SCNC: 102 MMOL/L (ref 98–107)
CO2 SERPL-SCNC: 27.7 MMOL/L (ref 22–29)
CREAT SERPL-MCNC: 1.07 MG/DL (ref 0.76–1.27)
CRP SERPL-MCNC: 0.54 MG/DL (ref 0–0.5)
DEPRECATED RDW RBC AUTO: 47.8 FL (ref 37–54)
EGFRCR SERPLBLD CKD-EPI 2021: 75.1 ML/MIN/1.73
EOSINOPHIL # BLD AUTO: 0.07 10*3/MM3 (ref 0–0.4)
EOSINOPHIL NFR BLD AUTO: 0.7 % (ref 0.3–6.2)
ERYTHROCYTE [DISTWIDTH] IN BLOOD BY AUTOMATED COUNT: 13.2 % (ref 12.3–15.4)
ERYTHROCYTE [SEDIMENTATION RATE] IN BLOOD: 8 MM/HR (ref 0–20)
GLOBULIN UR ELPH-MCNC: 2.4 GM/DL
GLUCOSE SERPL-MCNC: 125 MG/DL (ref 65–99)
HCT VFR BLD AUTO: 44.1 % (ref 37.5–51)
HGB BLD-MCNC: 14.6 G/DL (ref 13–17.7)
IMM GRANULOCYTES # BLD AUTO: 0.11 10*3/MM3 (ref 0–0.05)
IMM GRANULOCYTES NFR BLD AUTO: 1 % (ref 0–0.5)
LYMPHOCYTES # BLD AUTO: 2.56 10*3/MM3 (ref 0.7–3.1)
LYMPHOCYTES NFR BLD AUTO: 24.1 % (ref 19.6–45.3)
MCH RBC QN AUTO: 32.4 PG (ref 26.6–33)
MCHC RBC AUTO-ENTMCNC: 33.1 G/DL (ref 31.5–35.7)
MCV RBC AUTO: 97.8 FL (ref 79–97)
MONOCYTES # BLD AUTO: 0.84 10*3/MM3 (ref 0.1–0.9)
MONOCYTES NFR BLD AUTO: 7.9 % (ref 5–12)
NEUTROPHILS NFR BLD AUTO: 66.1 % (ref 42.7–76)
NEUTROPHILS NFR BLD AUTO: 7.01 10*3/MM3 (ref 1.7–7)
NRBC BLD AUTO-RTO: 0 /100 WBC (ref 0–0.2)
PLATELET # BLD AUTO: 233 10*3/MM3 (ref 140–450)
PMV BLD AUTO: 9.3 FL (ref 6–12)
POTASSIUM SERPL-SCNC: 4.4 MMOL/L (ref 3.5–5.2)
PROT SERPL-MCNC: 7 G/DL (ref 6–8.5)
RBC # BLD AUTO: 4.51 10*6/MM3 (ref 4.14–5.8)
SODIUM SERPL-SCNC: 139 MMOL/L (ref 136–145)
WBC NRBC COR # BLD AUTO: 10.61 10*3/MM3 (ref 3.4–10.8)

## 2024-11-21 PROCEDURE — 25010000002 KETOROLAC TROMETHAMINE PER 15 MG: Performed by: EMERGENCY MEDICINE

## 2024-11-21 PROCEDURE — 25010000002 DEXAMETHASONE SODIUM PHOSPHATE 10 MG/ML SOLUTION: Performed by: EMERGENCY MEDICINE

## 2024-11-21 PROCEDURE — 99284 EMERGENCY DEPT VISIT MOD MDM: CPT

## 2024-11-21 PROCEDURE — 80053 COMPREHEN METABOLIC PANEL: CPT | Performed by: EMERGENCY MEDICINE

## 2024-11-21 PROCEDURE — 86140 C-REACTIVE PROTEIN: CPT | Performed by: EMERGENCY MEDICINE

## 2024-11-21 PROCEDURE — 25010000002 DIPHENHYDRAMINE PER 50 MG: Performed by: EMERGENCY MEDICINE

## 2024-11-21 PROCEDURE — 85652 RBC SED RATE AUTOMATED: CPT | Performed by: EMERGENCY MEDICINE

## 2024-11-21 PROCEDURE — 96375 TX/PRO/DX INJ NEW DRUG ADDON: CPT

## 2024-11-21 PROCEDURE — 70450 CT HEAD/BRAIN W/O DYE: CPT

## 2024-11-21 PROCEDURE — 96374 THER/PROPH/DIAG INJ IV PUSH: CPT

## 2024-11-21 PROCEDURE — 85025 COMPLETE CBC W/AUTO DIFF WBC: CPT | Performed by: EMERGENCY MEDICINE

## 2024-11-21 RX ORDER — SODIUM CHLORIDE 0.9 % (FLUSH) 0.9 %
10 SYRINGE (ML) INJECTION AS NEEDED
Status: DISCONTINUED | OUTPATIENT
Start: 2024-11-21 | End: 2024-11-21 | Stop reason: HOSPADM

## 2024-11-21 RX ORDER — DEXAMETHASONE SODIUM PHOSPHATE 10 MG/ML
6 INJECTION, SOLUTION INTRAMUSCULAR; INTRAVENOUS ONCE
Status: COMPLETED | OUTPATIENT
Start: 2024-11-21 | End: 2024-11-21

## 2024-11-21 RX ORDER — DIPHENHYDRAMINE HYDROCHLORIDE 50 MG/ML
25 INJECTION INTRAMUSCULAR; INTRAVENOUS ONCE
Status: COMPLETED | OUTPATIENT
Start: 2024-11-21 | End: 2024-11-21

## 2024-11-21 RX ORDER — KETOROLAC TROMETHAMINE 30 MG/ML
15 INJECTION, SOLUTION INTRAMUSCULAR; INTRAVENOUS ONCE
Status: COMPLETED | OUTPATIENT
Start: 2024-11-21 | End: 2024-11-21

## 2024-11-21 RX ORDER — TRAMADOL HYDROCHLORIDE 50 MG/1
50 TABLET ORAL EVERY 6 HOURS PRN
Qty: 12 TABLET | Refills: 0 | Status: SHIPPED | OUTPATIENT
Start: 2024-11-21

## 2024-11-21 RX ADMIN — DIPHENHYDRAMINE HYDROCHLORIDE 25 MG: 50 INJECTION, SOLUTION INTRAMUSCULAR; INTRAVENOUS at 13:00

## 2024-11-21 RX ADMIN — KETOROLAC TROMETHAMINE 15 MG: 30 INJECTION, SOLUTION INTRAMUSCULAR at 12:58

## 2024-11-21 RX ADMIN — DEXAMETHASONE SODIUM PHOSPHATE 6 MG: 10 INJECTION, SOLUTION INTRAMUSCULAR; INTRAVENOUS at 12:59

## 2024-11-21 NOTE — ED PROVIDER NOTES
"Subjective   History of Present Illness  Chief complaint: Headache    68-year-old male presents with a headache.  Patient states headache has been present for about 3 weeks.  He states the pain waxes and wanes.  He states he is currently mostly in the left frontal region and it radiates down the left side of his face into his left jaw and left neck.  He denies any vision changes.  He has had no fever.  He has had no nausea or vomiting.  He went to an urgent care center and was placed on azithromycin.  He then went to his primary doctor and was prescribed a steroid and amoxicillin.  He thinks they were concerned about sinus infection.  He states he did have some brief improvement when he started the steroid and amoxicillin but then the symptoms returned.    History provided by:  Patient      Review of Systems   Constitutional:  Negative for fever.   HENT:  Negative for congestion.    Eyes:  Negative for visual disturbance.   Respiratory:  Negative for cough and shortness of breath.    Cardiovascular:  Negative for chest pain.   Gastrointestinal:  Negative for abdominal pain and vomiting.   Musculoskeletal:  Negative for back pain.   Neurological:  Positive for headaches. Negative for weakness and numbness.   Psychiatric/Behavioral:  Negative for confusion.        Past Medical History:   Diagnosis Date    Atrial fibrillation     Hyperlipidemia     Hypertension     MONICA (obstructive sleep apnea)     wears cpap    Rectal cancer     in remission       Allergies   Allergen Reactions    Adhesive Tape Hives     Blisters           Family History   Problem Relation Age of Onset    Heart disease Mother     Heart disease Father        Social History     Socioeconomic History    Marital status:    Tobacco Use    Smoking status: Never    Smokeless tobacco: Never   Vaping Use    Vaping status: Never Used   Substance and Sexual Activity    Alcohol use: Not Currently     Comment: occasionally; \"a case of beer a year\"    Drug " "use: Never    Sexual activity: Defer       /79 (BP Location: Left arm, Patient Position: Sitting)   Pulse 65   Temp 97.8 °F (36.6 °C) (Oral)   Resp 16   Ht 182.9 cm (72\")   Wt 112 kg (246 lb 7.6 oz)   SpO2 98%   BMI 33.43 kg/m²       Objective   Physical Exam  Vitals and nursing note reviewed.   Constitutional:       Appearance: Normal appearance.   HENT:      Head:      Comments: There is some tenderness throughout the left side of the face including the left frontal temporal area as well as over the left maxillary sinus.  There is no erythema or warmth.     Mouth/Throat:      Mouth: Mucous membranes are moist.   Eyes:      Pupils: Pupils are equal, round, and reactive to light.   Cardiovascular:      Rate and Rhythm: Normal rate and regular rhythm.      Heart sounds: Normal heart sounds.   Pulmonary:      Effort: Pulmonary effort is normal. No respiratory distress.      Breath sounds: Normal breath sounds.   Abdominal:      Palpations: Abdomen is soft.      Tenderness: There is no abdominal tenderness.   Skin:     General: Skin is warm and dry.   Neurological:      General: No focal deficit present.      Mental Status: He is alert and oriented to person, place, and time.         Procedures           ED Course      Results for orders placed or performed during the hospital encounter of 11/21/24   Sedimentation Rate    Collection Time: 11/21/24 12:47 PM    Specimen: Blood   Result Value Ref Range    Sed Rate 8 0 - 20 mm/hr   C-reactive Protein    Collection Time: 11/21/24 12:47 PM    Specimen: Blood   Result Value Ref Range    C-Reactive Protein 0.54 (H) 0.00 - 0.50 mg/dL   Comprehensive Metabolic Panel    Collection Time: 11/21/24 12:47 PM    Specimen: Blood   Result Value Ref Range    Glucose 125 (H) 65 - 99 mg/dL    BUN 15 8 - 23 mg/dL    Creatinine 1.07 0.76 - 1.27 mg/dL    Sodium 139 136 - 145 mmol/L    Potassium 4.4 3.5 - 5.2 mmol/L    Chloride 102 98 - 107 mmol/L    CO2 27.7 22.0 - 29.0 mmol/L    " Calcium 9.7 8.6 - 10.5 mg/dL    Total Protein 7.0 6.0 - 8.5 g/dL    Albumin 4.6 3.5 - 5.2 g/dL    ALT (SGPT) 48 (H) 1 - 41 U/L    AST (SGOT) 22 1 - 40 U/L    Alkaline Phosphatase 81 39 - 117 U/L    Total Bilirubin 0.6 0.0 - 1.2 mg/dL    Globulin 2.4 gm/dL    A/G Ratio 1.9 g/dL    BUN/Creatinine Ratio 14.0 7.0 - 25.0    Anion Gap 9.3 5.0 - 15.0 mmol/L    eGFR 75.1 >60.0 mL/min/1.73   CBC Auto Differential    Collection Time: 11/21/24 12:47 PM    Specimen: Blood   Result Value Ref Range    WBC 10.61 3.40 - 10.80 10*3/mm3    RBC 4.51 4.14 - 5.80 10*6/mm3    Hemoglobin 14.6 13.0 - 17.7 g/dL    Hematocrit 44.1 37.5 - 51.0 %    MCV 97.8 (H) 79.0 - 97.0 fL    MCH 32.4 26.6 - 33.0 pg    MCHC 33.1 31.5 - 35.7 g/dL    RDW 13.2 12.3 - 15.4 %    RDW-SD 47.8 37.0 - 54.0 fl    MPV 9.3 6.0 - 12.0 fL    Platelets 233 140 - 450 10*3/mm3    Neutrophil % 66.1 42.7 - 76.0 %    Lymphocyte % 24.1 19.6 - 45.3 %    Monocyte % 7.9 5.0 - 12.0 %    Eosinophil % 0.7 0.3 - 6.2 %    Basophil % 0.2 0.0 - 1.5 %    Immature Grans % 1.0 (H) 0.0 - 0.5 %    Neutrophils, Absolute 7.01 (H) 1.70 - 7.00 10*3/mm3    Lymphocytes, Absolute 2.56 0.70 - 3.10 10*3/mm3    Monocytes, Absolute 0.84 0.10 - 0.90 10*3/mm3    Eosinophils, Absolute 0.07 0.00 - 0.40 10*3/mm3    Basophils, Absolute 0.02 0.00 - 0.20 10*3/mm3    Immature Grans, Absolute 0.11 (H) 0.00 - 0.05 10*3/mm3    nRBC 0.0 0.0 - 0.2 /100 WBC     CT Head Without Contrast    Result Date: 11/21/2024  Impression: 1. No acute intracranial pathology. 2. There is complete opacification of the left maxillary sinus and ethmoidal air cells with widening and medial bowing involving the OMC. This raises the possibility of an obstructive polyp. The high density material likely relates to inspissated material, but cannot exclude fungal infection. Electronically Signed: Blayne Gastelum MD  11/21/2024 1:06 PM EST  Workstation ID: [id number]                                                    Medical Decision  Making    Patient had the above evaluation.  Results were discussed with the patient.  White blood cell count is normal.  CMP is unremarkable.  Sed rate is normal.  CRP is borderline elevated at 0.54.  CT head shows no acute intracranial abnormality.  Does show evidence of left maxillary sinusitis with the possibility of an obstructive polyp.  Patient has already been on 2 rounds of antibiotics and a round of steroids.  I will hold off on any further antibiotics at this time.  Patient will be referred to ENT.  They have walk-in hours tomorrow so he was instructed to go to the ENT clinic tomorrow for further management.  Patient voices understanding.  He was instructed to return for any new or worsening symptoms.  He will be given a prescription for tramadol.      Final diagnoses:   Nonintractable headache, unspecified chronicity pattern, unspecified headache type   Left maxillary sinusitis       ED Disposition  ED Disposition       ED Disposition   Discharge    Condition   Stable    Comment   --               [facility]  [address]  [facility] [address]  [phone]  Go in 1 day           Medication List        New Prescriptions      traMADol 50 MG tablet  Commonly known as: ULTRAM  Take 1 tablet by mouth Every 6 (Six) Hours As Needed for Moderate Pain.               Where to Get Your Medications        These medications were sent to [facility] - [address] [place] [address]      Hours: Monday to Friday 7 AM to 7 PM Phone: [phone]   traMADol 50 MG tablet            Shahriar Mills MD  11/21/24 [id number]

## 2024-11-21 NOTE — DISCHARGE INSTRUCTIONS
Follow-up with ENT as directed.  Return to the emergency room for any new or worsening symptoms or if you have any other questions or concerns.  Take medication as prescribed.

## 2024-11-21 NOTE — ED NOTES
Pt has had a headache from his forehead down his jaw into his neck for about a month. Pt went to  and was put on a zpack. Pt stated it didn't do anything but they didn't test him for any illnesses at the time. Then, pt went to PCP and was put on another antibiotic and steroid pack and that helped for a day then stopped working. Pt rating his pain 8/10.     Pt on blood thinners and water pills.

## 2024-12-16 ENCOUNTER — OFFICE VISIT (OUTPATIENT)
Dept: CARDIOLOGY | Facility: CLINIC | Age: 69
End: 2024-12-16
Payer: MEDICARE

## 2024-12-16 VITALS
HEART RATE: 65 BPM | DIASTOLIC BLOOD PRESSURE: 67 MMHG | SYSTOLIC BLOOD PRESSURE: 104 MMHG | RESPIRATION RATE: 18 BRPM | HEIGHT: 72 IN | OXYGEN SATURATION: 95 % | WEIGHT: 253 LBS | BODY MASS INDEX: 34.27 KG/M2

## 2024-12-16 DIAGNOSIS — I25.10 CORONARY ARTERY DISEASE INVOLVING NATIVE CORONARY ARTERY OF NATIVE HEART WITHOUT ANGINA PECTORIS: Primary | ICD-10-CM

## 2024-12-16 NOTE — PROGRESS NOTES
Cardiology Clinic Note  Truong Ortiz MD, PhD    Subjective:     Encounter Date:12/16/2024      Patient ID: Dillon Aaron is a 68 y.o. male.    Chief Complaint:  Chief Complaint   Patient presents with    Follow-up     6 months       HPI:        Previously I had the pleasure to see this very pleasant 68-year-old gentleman in clinic.    He has history of left bundle branch block as well as essential hypertension and paroxysmal atrial fibrillation.  He had previous abnormal stress test ordered for significant dyspnea on exertion and atypical symptoms which demonstrated ischemia in the septum.   He underwent invasive ischemic evaluation demonstrating 60% mid LAD disease compromising ostial proximal diagonal and the continuation LAD, FFR revealed borderline hemodynamic status with 0.91 value not meeting criteria for intervention, this is been managed medically, he was cleared for hip surgery and completed this successfully..   Today  He has been followed intermittently with paroxysmal nature of his atrial fibrillation as well as CV comorbidities and CAD.  he has [id number] score of 3, historically,  December 2023 work-up revealed abnormal stress test with anterior apical and septal reversibility concerning for progression of LAD and diagonal disease, December 2023 echo with newly reduced EF of 20% compared to 50% last year with need for invasive ischemic evaluation to evaluate LAD diagonal which underwent bifurcation PCI as documented below December 2023.  Also EP evaluation for more persistent atrial fibrillation which may be contributing to reduced EF in addition to left bundle branch block and dyssynchrony.  He underwent DC cardioversion followed by ablation for restoration of sinus rhythm.  EF improved 45 to 50% with residual apical hypokinesis but otherwise chronic, this was similar to February 2024 both demonstrating improvement in his overall LVEF post revascularization and maintenance of sinus rhythm.  He has  been struggling with sinus infection seen ENT recently     On f/u angina free, CARNES stable, vol ok, no syncope. We discussed GDMT extensively, f/u echo for eval of EF improvement which is now 45 to 50% residual anteroapical regional abnormality, November 2024  Diet exercise per AHA guidelines recommended  Mediterranean diet low-cholesterol        Review of systems negative for 14 point review of systems except as mentioned above     Historical data copied forward from previous encounters in EMR including the history, exam, and assessment/plan has been reviewed and is unchanged unless noted otherwise.     Cardiac medicines reviewed with risk, benefits, and necessity of each discussed.     Risk and benefit of cardiac catheterization i reviewed including death heart attack stroke pain bleeding infection need for vascular /cardiovascular surgery were discussed and the patient         Reviewed below     Physical Exam  Regular rate and rhythm no rubs murmurs gallops  No heave no lift  No clubbing cyanosis or edema  Clear to auscultation  Normocephalic atraumatic pupils equal round  Intact grossly  Soft nontender nondistended  Normal pulses normal cap refill  No carotid bruits or JVD  Unchanged from prior encounter     Assessment:         Coronary artery disease  Cardiomyopathy with newly reduced EF 20% compared to 50% previously last year  Abnormal stress test with reversibility at the apex and septum  December 2023 shockwave balloon lithotripsy to the mid LAD diagonal bifurcation 2.5 x 12 utilization of 80 pulses circumferential calcium at least 180 degrees  3.,  Bifurcation PCI, 2.5 x 12 Xience drug-eluting stent postdilated at 20 dinh to the ostial proximal diagonal branch at 3 mm  4.,  LAD PCI 2.5 x 28 Xience drug-eluting stent in the midportion spanning the diagonal bifurcation, deployment at 20 dinh postdilatation 3.25 x 12 NC balloon at 18 to 20 dinh and distal to proximal fashion  Crestor 20 will continue  Goal LDL  "less than 70 optimally less than 55     Hyperlipidemia, as above     Recurrent A-fib RVR  Status post BLANCHE cardioversion followed by A-fib ablation December 2023  Continue Xarelto  Off amiodarone  Metoprolol 12.5 daily  Will consider change to sotalol, may drop his heart rate too much, may require pacemaker in the future  Atrial fibrillation results and significant decline in his EF historically, maintaining sinus rhythm really essential for him long-term     Combined ischemic and nonischemic cardiomyopathy  Status post revascularization  Decline in EF possilby related to A-fib RVR recurrence of arrhythmia  Now maintaining sinus rhythm  Beta-blocker on board  Continue Entresto for now, can change back to losartan if Cosper negative, would pick 25 mg daily  High-dose Crestor 20 daily, goal LDL less than 70  Continues Plavix  Can transition back to aspirin from Plavix at any point     Weight gain, encouraged diet exercise, heart healthy lifestyle, okay for any exercise plan as tolerated by functional status    Sinus infection currently, followed by ENT, planning intervention, can be off Plavix and Eliquis at any point from a cardiac standpoint  Objective:         /67 (BP Location: Right arm, Patient Position: Sitting, Cuff Size: Large Adult)   Pulse 65   Resp 18   Ht 182.9 cm (72\")   Wt 115 kg (253 lb)   SpO2 95%   BMI 34.31 kg/m²     Physical Exam    Assessment:         There are no diagnoses linked to this encounter.       Plan:              The pleasure to be involved in this patient's cardiovascular care.  Please call with any questions or concerns  Truong Ortiz MD, PhD    Most recent EKG as reviewed and interpreted by me:    ECG 12 Lead    Date/Time: 12/16/2024 4:59 PM  Performed by: Truong Ortiz MD    Authorized by: Truong Ortiz MD  Comparison: not compared with previous ECG   Previous ECG: no previous ECG available  Rhythm: sinus rhythm  Rate: normal    Clinical impression: " abnormal EKG  Comments: Sinus rhythm, monomorphic PVCs, left bundle branch block           Most recent echo as reviewed and interpreted by me:  Results for orders placed during the hospital encounter of 11/05/24    Adult Transthoracic Echo Complete W/ Color, Spectral and Contrast if Necessary Per Protocol    Interpretation Summary    Left ventricular systolic function is mildly decreased. Left ventricular ejection fraction appears to be 46 - 50%.    Left ventricular wall thickness is consistent with mild concentric hypertrophy.    Left ventricular diastolic function is consistent with (grade I) impaired relaxation.    The left atrial cavity is mildly dilated.    Estimated right ventricular systolic pressure from tricuspid regurgitation is mildly elevated (35-45 mmHg).      Most recent stress test as reviewed and interpreted by me:  Results for orders placed during the hospital encounter of 11/24/23    Stress Test With Myocardial Perfusion One Day    Interpretation Summary    Left ventricular ejection fraction is severely reduced (Calculated EF = 22%).    Abnormal LV wall motion consistent with severe hypokinesis.    Myocardial perfusion imaging indicates a moderate-sized infarct located in the anterior wall, septal wall and apex with moderate brad-infarct ischemia.    Impressions are consistent with a high risk study.    There is no prior study available for comparison. Resting and stress images were compared Resting images demonstrate decreased counts in the mid to apical anterior wall and anteroseptum consistent with prior MI Stress images demonstrate worsening of brad-infarct ischemia at least moderate in the anteroseptum, anterior wall apex and inferoseptum.  EF 22%, dilated ventricle  mL Abnormal study.    Findings consistent with an equivocal ECG stress test.    Abnormal study  Moderate brad-infarct ischemia in the septum anterior wall and apex  Severely reduced EF 22%  Underlying atrial fibrillation  with left bundle branch block  Dilated ventricle  Intermediate/ high risk study      Most recent cardiac catheterization as reviewed interpreted by me:  Results for orders placed during the hospital encounter of 12/01/23    Cardiac Catheterization/Vascular Study    Conclusion  Primary operative Truong Ortiz MD, PhD  [facility] cardiology  Date of service 12-1-2023    Procedure  1.  Left heart catheterization coronary angiography left ventriculography on MENESES position  2.  Shockwave balloon lithotripsy to the mid LAD diagonal bifurcation 2.5 x 12 utilization of 80 pulses circumferential calcium at least 180 degrees  3.,  Bifurcation PCI, 2.5 x 12 Xience drug-eluting stent postdilated at 20 dinh to the ostial proximal diagonal branch at 3 mm  4.,  LAD PCI 2.5 x 28 Xience drug-eluting stent in the midportion spanning the diagonal bifurcation, deployment at 20 dinh postdilatation 3.25 x 12 NC balloon at 18 to 20 dinh and distal to proximal fashion    Reduction of stenosis ostial diagonal branch 10% residual, less than 10% residual stenosis of LAD, HONORIO-3 flow pre and post, no distal wire trauma edge dissection or other complication    After informed consent patient brought to the Cath Lab sterilely prepped and draped in usual fashion exposure of the right groin for right common femoral arterial access via micropuncture and modified Seldinger technique with placement of a 6 [place]ian sheath.  A 3 5 guidewires advanced aortic valve followed by diagnostic JL 4 and JR4 catheters for selective left and right coronary angiography respectively.  JR4 was used to cross aortic valve followed by hand-injection for LV gram EDP assessment and pullback assessment of the transaortic valve gradient.  There was obstructive disease in the mid LAD at the diagonal bifurcation at least 80% to 90% lara 1/1/1 morphology with 180 degrees of circumferential calcium.  There was progression year-over-year from last year comparative and  angiography.  He has a newly reduced EF of 20%, previously 50% with abnormal stress test demonstrating reversibility of the anterior wall apex and septum.  Decision made for intervention.  Patient patient heparinized with 100 units/kg of heparin with repeated boluses maintain ACT greater than 250 on background therapy of aspirin.  A 7 Malian sheath was then upsized along with 7 Malian EBU 4.0 guide to engage left main, run-through wire to the diagonal branch and a BMW wire to the LAD, given calcification and bifurcation morphology, shockwave balloon lithotripsy with 2.5 x 12 balloon was performed with 80 pulses in the LAD.  We attempted to perform also of the ostial proximal diagonal branch however secondary wire wrapped we were unable to advance the deployed shockwave balloon.  This was ultimately removed followed by predilation of the ostial proximal diagonal segment with 2.5 x 12 NC balloon at 18 dinh with full expansion, further predilation was also performed in the LAD similarly, a 2.5 x 12 Xience drug stent was positioned the ostial proximal diagonal branch and a 2.5 x 28 Xience negative stent in the mid LAD, the diagonal lumen was deployed at 16 idnh followed by pullback of the stent balloon for postdilatation at 20 to 22 dinh for 30 seconds with great angiographic results, mild stepdown distally but otherwise much improvement of the ostial proximal segment.  This was removed followed by deployment of the LAD stent at 20 dinh which was viewed to be slightly undersized with improved runoff after predilation and nitroglycerin.  This was reinflated 22 dinh followed by postdilatation with 3.25 x 12 noncompliant balloon at 18 dinh and distal to proximal fashion with great angiographic results, 10% residual stenosis of the lesion, 20% residual stenosis in the ostial proximal diagonal branch.  Proximal optimization was performed as well at 22 dinh above the diagonal takeoff with great results.  No distal wire trauma edge  dissection or complications.  6 [place]ian Angio-Seal was used to close right, from arteriotomy with immediate hemostasis and maintenance of distal pulses.  He left Cath Lab chest pain-free hemodynamically electrically stable alert talkative staff neurologically gross intact bilaterally    Findings  1.  Opening aortic pressure of 109/62, closing pressure was unchanged  2.,  No LV gram performed to conserve contrast, LVEDP elevated 23-24  3.  Normal transorbital gradient on pullback    Angiography  1 left main normal no disease  2.  The LAD is a medium caliber vessel with 80 to 90% disease at the bifurcation including the ostial proximal diagonal branch which is also 80 to 90%.  Remainder the vessel diffuse luminal irregularities only 20%  3.  Circumflex large caliber nondominant vessel with minimal angiographic disease throughout less than 10%  Over 4 RCA is a large-caliber dominant vessel, diffuse luminal irregularities less than 20% throughout    Conclusions recommendations  1 successful bifurcation stenting, 10% residual stenosis in each vessel at the bifurcation status post shockwave balloon lithotripsy and high-pressure postdilatation and proximal optimization as described  2.  Aspirin and P2 Y12 B inhibitor will be continued with cessation of aspirin in 30 days.  He will be back on anticoagulation given underlying atrial fibrillation    High intensity statin  Beta-blocker  Patient be admitted for rate control of atrial fibrillation with consult electrophysiology for consideration for elective DC cardioversion given severely reduced LV systolic function likely contributory by underlying atrial fibrillation and left bundle branch block with consideration for device placement with prior sinus bradycardia not amenable for beta-blockers previously essentially with tachybradycardia syndrome and likely combined ischemic nonischemic cardiomyopathy    Further recommendation follow findings and clinical course    Truong  MD Angel, PhD    The following portions of the patient's history were reviewed and updated as appropriate: allergies, current medications, past family history, past medical history, past social history, past surgical history, and problem list.      ROS:  14 point review of systems negative except as mentioned above    Current Outpatient Medications:     apixaban (Eliquis) 5 MG tablet tablet, Take 1 tablet by mouth Every 12 (Twelve) Hours. Indications: Atrial Fibrillation, Disp: 180 tablet, Rfl: 1    clopidogrel (PLAVIX) 75 MG tablet, Take 1 tablet by mouth Daily., Disp: 30 tablet, Rfl: 6    doxycycline (VIBRAMYCIN) 100 MG capsule, take 1 capsule by oral route 2 times every day, Disp: 28 capsule, Rfl: 0    ezetimibe (ZETIA) 10 MG tablet, Take 1 tablet by mouth Daily., Disp: 90 tablet, Rfl: 3    finasteride (PROSCAR) 5 MG tablet, Take 1 tablet by mouth Daily., Disp: 90 tablet, Rfl: 3    metoprolol succinate XL (TOPROL-XL) 25 MG 24 hr tablet, Take 0.5 tablets by mouth Daily., Disp: 30 tablet, Rfl: 11    nitroglycerin (NITROSTAT) 0.4 MG SL tablet, Place 1 tablet under the tongue Every 5 (Five) Minutes As Needed for Chest Pain (Systolic BP Greater Than 100). Take no more than 3 doses in 15 minutes., Disp: 100 tablet, Rfl: 12    rosuvastatin (CRESTOR) 20 MG tablet, Take 1 tablet by mouth Daily., Disp: 90 tablet, Rfl: 0    sacubitril-valsartan (ENTRESTO) 24-26 MG tablet, Take 1 tablet by mouth Every 12 (Twelve) Hours., Disp: 60 tablet, Rfl: 3    vitamin D3 125 MCG (5000 UT) capsule capsule, Take 1 capsule by mouth Daily., Disp: , Rfl:     Problem List:  Patient Active Problem List   Diagnosis    Gastroenteritis, acute    Mixed hyperlipidemia    Essential hypertension    Left bundle branch block    Low back pain    Obesity    Paroxysmal atrial fibrillation    Abnormal stress test    Chest pain, atypical    CAD (coronary artery disease)    Ischemic cardiomyopathy    Persistent atrial fibrillation    Chest pain    Contact  "dermatitis    A-fib     Past Medical History:  Past Medical History:   Diagnosis Date    Atrial fibrillation     Hyperlipidemia     Hypertension     MONICA (obstructive sleep apnea)     wears cpap    Rectal cancer     in remission     Past Surgical History:    Social History:  Social History     Socioeconomic History    Marital status:    Tobacco Use    Smoking status: Never    Smokeless tobacco: Never   Vaping Use    Vaping status: Never Used   Substance and Sexual Activity    Alcohol use: Not Currently     Comment: occasionally; \"a case of beer a year\"    Drug use: Never    Sexual activity: Defer     Allergies:  Allergies   Allergen Reactions    Adhesive Tape Hives     Blisters     Immunizations:  Immunization History   Administered Date(s) Administered    COVID-19 (MODERNA) 1st,2nd,3rd Dose Monovalent 02/10/2021, 03/16/2021    Flu Vaccine Quad PF 6-35MO 09/24/2016, 10/20/2019    Fluad Quad 65+ 10/20/2020    Fluzone  >6mos 10/28/2017    Fluzone (or Fluarix & Flulaval for VFC) >6mos 10/20/2019    Hepatitis A 04/29/2018, 11/05/2018    Influenza, Unspecified 01/08/2013, 11/24/2013, 10/24/2014, 11/05/2018    Pneumococcal Conjugate 13-Valent (PCV13) 09/24/2020    Zostavax 12/08/2016            In-Office Procedure(s):  No orders to display        ASCVD RIsk Score::  The ASCVD Risk score ([place] DK, et al., 2019) failed to calculate for the following reasons:    The valid total cholesterol range is 130 to 320 mg/dL    Imaging:    Results for orders placed during the hospital encounter of 12/06/23    XR Chest 1 View    Narrative  XR CHEST 1 VW    Date of Exam: 12/6/2023 5:32 PM EST    Indication: chest pain    Comparison: PE protocol chest and 2 from 10/18/2023, portable chest radiograph dated 12/17/2016    Findings:    The lungs are grossly clear. Cardiac, hilar, and mediastinal silhouettes are stable with moderate cardiomegaly. Pulmonary vascularity is within upper range of normal. No pneumothorax or pleural " effusions. The trachea is midline.  No acute bony  abnormality or aggressive appearing focal osseous lesions in the visualized bony thorax.    Impression  Impression:  Stable cardiomegaly. No active cardiopulmonary disease.        Electronically Signed: Kishor Segovia DO  12/6/2023 5:47 PM EST  Workstation ID: [id number]       Results for orders placed during the hospital encounter of 11/21/24    CT Head Without Contrast    Narrative  CT HEAD WO CONTRAST    Date of Exam: 11/21/2024 12:52 PM EST    Indication: left frontal headache.    Comparison: None available.    Technique: Axial CT images were obtained of the head without contrast administration.  Coronal reconstructions were performed.  Automated exposure control and iterative reconstruction methods were used.      Findings:  No intra or extra-axial fluid collections, masses, or areas of hemorrhage are identified. No midline shift or mass effect is identified. Normal gray and white matter differentiation. Old lacunar infarct within the right basal ganglia. Ventricles and  sulci are age-appropriate. Basal cisterns are patent. Orbits are unremarkable. Complete opacification of left ethmoid air cells and left maxillary sinus with widening of the OMC and bowing medially. There is also high density material within the sinus.    Impression  Impression:    1. No acute intracranial pathology.  2. There is complete opacification of the left maxillary sinus and ethmoidal air cells with widening and medial bowing involving the OMC. This raises the possibility of an obstructive polyp. The high density material likely relates to inspissated  material, but cannot exclude fungal infection.      Electronically Signed: Blayne Gastelum MD  11/21/2024 1:06 PM EST  Workstation ID: [id number]      Results for orders placed during the hospital encounter of 11/21/24    CT Head Without Contrast    Narrative  CT HEAD WO CONTRAST    Date of Exam: 11/21/2024 12:52 PM EST    Indication: left  frontal headache.    Comparison: None available.    Technique: Axial CT images were obtained of the head without contrast administration.  Coronal reconstructions were performed.  Automated exposure control and iterative reconstruction methods were used.      Findings:  No intra or extra-axial fluid collections, masses, or areas of hemorrhage are identified. No midline shift or mass effect is identified. Normal gray and white matter differentiation. Old lacunar infarct within the right basal ganglia. Ventricles and  sulci are age-appropriate. Basal cisterns are patent. Orbits are unremarkable. Complete opacification of left ethmoid air cells and left maxillary sinus with widening of the OMC and bowing medially. There is also high density material within the sinus.    Impression  Impression:    1. No acute intracranial pathology.  2. There is complete opacification of the left maxillary sinus and ethmoidal air cells with widening and medial bowing involving the OMC. This raises the possibility of an obstructive polyp. The high density material likely relates to inspissated  material, but cannot exclude fungal infection.      Electronically Signed: Blayne Gastelmu MD  11/21/2024 1:06 PM EST  Workstation ID: [id number]      Lab Review:   Admission on 11/21/2024, Discharged on 11/21/2024   Component Date Value    Sed Rate 11/21/2024 8     C-Reactive Protein 11/21/2024 0.54 (H)     Glucose 11/21/2024 125 (H)     BUN 11/21/2024 15     Creatinine 11/21/2024 1.07     Sodium 11/21/2024 139     Potassium 11/21/2024 4.4     Chloride 11/21/2024 102     CO2 11/21/2024 27.7     Calcium 11/21/2024 9.7     Total Protein 11/21/2024 7.0     Albumin 11/21/2024 4.6     ALT (SGPT) 11/21/2024 48 (H)     AST (SGOT) 11/21/2024 22     Alkaline Phosphatase 11/21/2024 81     Total Bilirubin 11/21/2024 0.6     Globulin 11/21/2024 2.4     A/G Ratio 11/21/2024 1.9     BUN/Creatinine Ratio 11/21/2024 14.0     Anion Gap 11/21/2024 9.3     eGFR  11/21/2024 75.1     WBC 11/21/2024 10.61     RBC 11/21/2024 4.51     Hemoglobin 11/21/2024 14.6     Hematocrit 11/21/2024 44.1     MCV 11/21/2024 97.8 (H)     MCH 11/21/2024 32.4     MCHC 11/21/2024 33.1     RDW 11/21/2024 13.2     RDW-SD 11/21/2024 47.8     MPV 11/21/2024 9.3     Platelets 11/21/2024 233     Neutrophil % 11/21/2024 66.1     Lymphocyte % 11/21/2024 24.1     Monocyte % 11/21/2024 7.9     Eosinophil % 11/21/2024 0.7     Basophil % 11/21/2024 0.2     Immature Grans % 11/21/2024 1.0 (H)     Neutrophils, Absolute 11/21/2024 7.01 (H)     Lymphocytes, Absolute 11/21/2024 2.56     Monocytes, Absolute 11/21/2024 0.84     Eosinophils, Absolute 11/21/2024 0.07     Basophils, Absolute 11/21/2024 0.02     Immature Grans, Absolute 11/21/2024 0.11 (H)     nRBC 11/21/2024 0.0    Hospital Outpatient Visit on 11/05/2024   Component Date Value    LV GLOBAL STRAIN  11/05/2024 -13.2     LVIDd 11/05/2024 4.4     LVIDs 11/05/2024 3.5     IVSd 11/05/2024 1.10     LVPWd 11/05/2024 1.10     FS 11/05/2024 20.5     IVS/LVPW 11/05/2024 1.00     ESV(cubed) 11/05/2024 42.9     LV Sys Vol (BSA correcte* 11/05/2024 33.8     EDV(cubed) 11/05/2024 85.2     LV Wright Vol (BSA correct* 11/05/2024 67.9     LV mass(C)d 11/05/2024 168.9     LVOT area 11/05/2024 3.5     LVOT diam 11/05/2024 2.10     EDV(MOD-sp4) 11/05/2024 152.0     ESV(MOD-sp4) 11/05/2024 75.8     SV(MOD-sp4) 11/05/2024 76.2     SVi(MOD-SP4) 11/05/2024 34.0     SVi (LVOT) 11/05/2024 21.2     EF(MOD-sp4) 11/05/2024 50.1     MV E max alton 11/05/2024 63.9     MV A max alton 11/05/2024 83.3     MV dec time 11/05/2024 0.20     MV E/A 11/05/2024 0.77     Pulm A Revs Dur 11/05/2024 0.11     LA ESV Index (BP) 11/05/2024 27.4     Med Peak E' Alton 11/05/2024 5.1     Lat Peak E' Alton 11/05/2024 7.6     TR max alton 11/05/2024 271.0     Avg E/e' ratio 11/05/2024 10.06     SV(LVOT) 11/05/2024 47.5     TAPSE (>1.6) 11/05/2024 2.38     RV S' 11/05/2024 11.3     LA dimension (2D)  11/05/2024 4.2      Pulm Sys Alton 11/05/2024 82.7     Pulm Wright Alton 11/05/2024 48.0     Pulm S/D 11/05/2024 1.72     Pulm A Revs Alton 11/05/2024 35.1     LV V1 max 11/05/2024 79.7     LV V1 max PG 11/05/2024 2.5     LV V1 mean PG 11/05/2024 1.00     LV V1 VTI 11/05/2024 13.7     Ao pk alton 11/05/2024 142.0     Ao max PG 11/05/2024 8.1     Ao mean PG 11/05/2024 4.0     Ao V2 VTI 11/05/2024 30.1     CHEKO(I,D) 11/05/2024 1.58     MV max PG 11/05/2024 2.6     MV mean PG 11/05/2024 1.00     MV V2 VTI 11/05/2024 29.6     MV P1/2t 11/05/2024 77.6     MVA(P1/2t) 11/05/2024 2.8     MVA(VTI) 11/05/2024 1.60     MV dec slope 11/05/2024 287.0     MR max alton 11/05/2024 467.0     MR max PG 11/05/2024 87.2     MR mean alton 11/05/2024 363.0     MR mean PG 11/05/2024 58.0     MR VTI 11/05/2024 175.0     TR max PG 11/05/2024 29.4     RVSP(TR) 11/05/2024 37.4     RAP systole 11/05/2024 8.0     RV V1 max PG 11/05/2024 1.51     RV V1 max 11/05/2024 61.5     RV V1 VTI 11/05/2024 13.6     PA V2 max 11/05/2024 111.0     Ao root diam 11/05/2024 3.8     Sinus 11/05/2024 3.6     EF(MOD-bp) 11/05/2024 50.0      Recent labs reviewed and interpreted for clinical significance and application            Level of Care:           Truong Ortiz MD  12/16/24  .

## 2025-01-02 RX ORDER — ROSUVASTATIN CALCIUM 20 MG/1
20 TABLET, COATED ORAL DAILY
Qty: 90 TABLET | Refills: 0 | Status: SHIPPED | OUTPATIENT
Start: 2025-01-02

## 2025-02-04 RX ORDER — METOPROLOL SUCCINATE 25 MG/1
12.5 TABLET, EXTENDED RELEASE ORAL DAILY
Qty: 30 TABLET | Refills: 11 | Status: SHIPPED | OUTPATIENT
Start: 2025-02-04

## 2025-02-24 RX ORDER — CLOPIDOGREL BISULFATE 75 MG/1
75 TABLET ORAL DAILY
Qty: 30 TABLET | Refills: 6 | Status: SHIPPED | OUTPATIENT
Start: 2025-02-24

## 2025-04-06 RX ORDER — ROSUVASTATIN CALCIUM 20 MG/1
20 TABLET, COATED ORAL DAILY
Qty: 90 TABLET | Refills: 0 | Status: CANCELLED | OUTPATIENT
Start: 2025-04-06

## 2025-04-07 RX ORDER — ROSUVASTATIN CALCIUM 20 MG/1
20 TABLET, COATED ORAL DAILY
Qty: 90 TABLET | Refills: 0 | Status: SHIPPED | OUTPATIENT
Start: 2025-04-07

## 2025-04-15 ENCOUNTER — OFFICE VISIT (OUTPATIENT)
Dept: CARDIOLOGY | Facility: CLINIC | Age: 70
End: 2025-04-15
Payer: MEDICARE

## 2025-04-15 VITALS
SYSTOLIC BLOOD PRESSURE: 120 MMHG | HEART RATE: 63 BPM | DIASTOLIC BLOOD PRESSURE: 75 MMHG | WEIGHT: 254.6 LBS | OXYGEN SATURATION: 96 % | HEIGHT: 72 IN | BODY MASS INDEX: 34.48 KG/M2

## 2025-04-15 DIAGNOSIS — I44.7 LEFT BUNDLE BRANCH BLOCK: ICD-10-CM

## 2025-04-15 DIAGNOSIS — I48.0 PAROXYSMAL ATRIAL FIBRILLATION: Primary | ICD-10-CM

## 2025-04-15 DIAGNOSIS — I25.10 CORONARY ARTERY DISEASE INVOLVING NATIVE CORONARY ARTERY OF NATIVE HEART WITHOUT ANGINA PECTORIS: ICD-10-CM

## 2025-04-15 PROCEDURE — 3078F DIAST BP <80 MM HG: CPT | Performed by: INTERNAL MEDICINE

## 2025-04-15 PROCEDURE — 1159F MED LIST DOCD IN RCRD: CPT | Performed by: INTERNAL MEDICINE

## 2025-04-15 PROCEDURE — 1160F RVW MEDS BY RX/DR IN RCRD: CPT | Performed by: INTERNAL MEDICINE

## 2025-04-15 PROCEDURE — 3074F SYST BP LT 130 MM HG: CPT | Performed by: INTERNAL MEDICINE

## 2025-04-15 PROCEDURE — 99213 OFFICE O/P EST LOW 20 MIN: CPT | Performed by: INTERNAL MEDICINE

## 2025-04-15 RX ORDER — MULTIVIT WITH MINERALS/LUTEIN
250 TABLET ORAL DAILY
COMMUNITY

## 2025-04-15 RX ORDER — VIT D3/FOLIC/B6/B12/ACETYLCYST 20 MCG-1MG
TABLET ORAL
COMMUNITY
Start: 2025-03-05

## 2025-04-15 NOTE — PROGRESS NOTES
"Progress note      Name: Dillon Aaron ADMIT: (Not on file)   : 1955  PCP: Tre Burkett MD    MRN: [id number] LOS: 0 days   AGE/SEX: 69 y.o. male  ROOM: Room/bed info not found     Chief Complaint   Patient presents with    Follow-up       Subjective       History of present illness  Dillon Aaron is a 69-year-old male patient who has coronary artery disease status post PCI to the LAD on 2023, chronic left bundle branch block, atrial fibrillation diagnosed in 2023. He underwent BLANCHE guided cardioversion on 2023. After that he underwent ablation on 2023.     Past Medical History:   Diagnosis Date    Atrial fibrillation     Hyperlipidemia     Hypertension     MONICA (obstructive sleep apnea)     wears cpap    Rectal cancer     in remission       Family History   Problem Relation Age of Onset    Heart disease Mother     Heart disease Father      Social History     Tobacco Use    Smoking status: Never     Passive exposure: Never    Smokeless tobacco: Never   Vaping Use    Vaping status: Never Used   Substance Use Topics    Alcohol use: Not Currently     Comment: occasionally; \"a case of beer a year\"    Drug use: Never       Current Outpatient Medications:     apixaban (ELIQUIS) 5 MG tablet tablet, Take 1 tablet by mouth Every 12 (Twelve) Hours. Indications: Atrial Fibrillation, Disp: 180 tablet, Rfl: 1    clopidogrel (PLAVIX) 75 MG tablet, Take 1 tablet by mouth Daily., Disp: 30 tablet, Rfl: 6    ezetimibe (ZETIA) 10 MG tablet, Take 1 tablet by mouth Daily., Disp: 90 tablet, Rfl: 3    finasteride (PROSCAR) 5 MG tablet, Take 1 tablet by mouth Daily., Disp: 90 tablet, Rfl: 3    metoprolol succinate XL (TOPROL-XL) 25 MG 24 hr tablet, Take 0.5 tablets by mouth Daily., Disp: 30 tablet, Rfl: 11    nitroglycerin (NITROSTAT) 0.4 MG SL tablet, Place 1 tablet under the tongue Every 5 (Five) Minutes As Needed for Chest Pain (Systolic BP Greater Than 100). Take no more than 3 doses in 15 minutes., " Disp: 100 tablet, Rfl: 12    ofloxacin (OCUFLOX) 0.3 % ophthalmic solution, Administer 1 drop to operative eye 4 (Four) Times a Day for 7 days then discontinue, Disp: 5 mL, Rfl: 0    prednisoLONE acetate (PRED FORTE) 1 % ophthalmic suspension, USE AS DIRECTED ON INSTRUCTION SHEET, Disp: 5 mL, Rfl: 1    rosuvastatin (CRESTOR) 20 MG tablet, Take 1 tablet by mouth Daily., Disp: 90 tablet, Rfl: 0    sacubitril-valsartan (ENTRESTO) 24-26 MG tablet, Take 1 tablet by mouth Every 12 (Twelve) Hours., Disp: 60 tablet, Rfl: 3    Scopolamine 1 MG/3DAYS patch, Place 1 patch on the skin as directed to the hairless area behind 1 ear at least 4 hours before effect is required reaply Every 72 (Seventy-Two) Hours As Needed., Disp: 4 patch, Rfl: 0    vitamin C (ASCORBIC ACID) 250 MG tablet, Take 1 tablet by mouth Daily., Disp: , Rfl:     vitamin D3 125 MCG (5000 UT) capsule capsule, Take 1 capsule by mouth Daily., Disp: , Rfl:     Multiple Vitamin ([place]) tablet, , Disp: , Rfl:   Allergies:  Adhesive tape      Physical Exam  VITALS REVIEWED    General:      well developed, in no acute distress.    Head:      normocephalic and atraumatic.    Eyes:      PERRL/EOM intact, conjunctiva and sclera clear with out nystagmus.    Neck:      no masses, thyromegaly,  trachea central with normal respiratory effort and PMI displaced laterally  Lungs:      Clear to auscultation bilaterally  Heart:       Regular rate and rhythm  Msk:      no deformity or scoliosis noted of thoracic or lumbar spine.    Pulses:      pulses normal in all 4 extremities.    Extremities:       No lower extremity edema  Neurologic:      no focal deficits.   alert oriented x3  Skin:      intact without lesions or rashes.    Psych:      alert and cooperative; normal mood and affect; normal attention span and concentration.      Result Review :               Pertinent cardiac workup    Heart cath 12/1/2023 90% LAD disease, PCI.  Echo 11/27/2023 ejection fraction 15 to  20%  Echo 2/28/2024 ejection fraction 45 to 50%         Procedures        Assessment and Plan      Dillon Aaron is a 69-year-old male patient who has complex cardiac issues including coronary artery disease status post PCI to the LAD, also has atrial fibrillation for which he received cardioversion in early December followed by A-fib ablation on 12/28/2023.   He was on amiodarone for a while but that was discontinued.  He did have cardiomyopathy but his EF recovered out of ICD range.  He does have left bundle branch block.  As far as A-fib, no documented A-fib since the ablation, EKG on [facility] today shows sinus and EKGs in his phone also shows sinus from his Apple Watch.  Continue same therapy, follow-up in 1 year.    Diagnoses and all orders for this visit:    1. Paroxysmal atrial fibrillation (Primary)    2. Left bundle branch block    3. Coronary artery disease involving native coronary artery of native heart without angina pectoris           Return in about 1 year (around 4/15/2026).  Patient was given instructions and counseling regarding his condition or for health maintenance advice. Please see specific information pulled into the AVS if appropriate.       Electronically signed by Alvaro Nava MD, 04/15/25, 12:30 PM EDT.

## 2025-05-28 RX ORDER — EZETIMIBE 10 MG/1
10 TABLET ORAL DAILY
Qty: 90 TABLET | Refills: 3 | Status: SHIPPED | OUTPATIENT
Start: 2025-05-28

## 2025-07-08 RX ORDER — ROSUVASTATIN CALCIUM 20 MG/1
20 TABLET, COATED ORAL DAILY
Qty: 90 TABLET | Refills: 0 | Status: SHIPPED | OUTPATIENT
Start: 2025-07-08

## 2025-07-21 ENCOUNTER — TELEPHONE (OUTPATIENT)
Dept: CARDIOLOGY | Facility: CLINIC | Age: 70
End: 2025-07-21

## 2025-08-12 ENCOUNTER — HOSPITAL ENCOUNTER (OUTPATIENT)
Dept: GENERAL RADIOLOGY | Facility: HOSPITAL | Age: 70
Discharge: HOME | End: 2025-08-12
Admitting: NURSE PRACTITIONER
Payer: MEDICARE

## 2025-08-12 ENCOUNTER — OFFICE VISIT (OUTPATIENT)
Dept: CARDIOLOGY | Facility: CLINIC | Age: 70
End: 2025-08-12
Payer: MEDICARE

## 2025-08-12 VITALS
HEIGHT: 68 IN | SYSTOLIC BLOOD PRESSURE: 106 MMHG | WEIGHT: 247 LBS | BODY MASS INDEX: 37.44 KG/M2 | RESPIRATION RATE: 16 BRPM | OXYGEN SATURATION: 97 % | DIASTOLIC BLOOD PRESSURE: 69 MMHG | HEART RATE: 64 BPM

## 2025-08-12 DIAGNOSIS — I42.0 CARDIOMYOPATHY, DILATED: ICD-10-CM

## 2025-08-12 DIAGNOSIS — I48.0 PAROXYSMAL ATRIAL FIBRILLATION: ICD-10-CM

## 2025-08-12 DIAGNOSIS — I25.708 CORONARY ARTERY DISEASE OF BYPASS GRAFT OF NATIVE HEART WITH STABLE ANGINA PECTORIS: Primary | ICD-10-CM

## 2025-08-12 DIAGNOSIS — I25.708 CORONARY ARTERY DISEASE OF BYPASS GRAFT OF NATIVE HEART WITH STABLE ANGINA PECTORIS: ICD-10-CM

## 2025-08-12 DIAGNOSIS — R07.9 CHEST PAIN, UNSPECIFIED TYPE: ICD-10-CM

## 2025-08-12 PROCEDURE — 3074F SYST BP LT 130 MM HG: CPT | Performed by: NURSE PRACTITIONER

## 2025-08-12 PROCEDURE — 99214 OFFICE O/P EST MOD 30 MIN: CPT | Performed by: NURSE PRACTITIONER

## 2025-08-12 PROCEDURE — 93000 ELECTROCARDIOGRAM COMPLETE: CPT | Performed by: NURSE PRACTITIONER

## 2025-08-12 PROCEDURE — 71046 X-RAY EXAM CHEST 2 VIEWS: CPT

## 2025-08-12 PROCEDURE — 3078F DIAST BP <80 MM HG: CPT | Performed by: NURSE PRACTITIONER

## 2025-08-12 RX ORDER — ISOSORBIDE MONONITRATE 30 MG/1
30 TABLET, EXTENDED RELEASE ORAL DAILY
Qty: 30 TABLET | Refills: 11 | Status: SHIPPED | OUTPATIENT
Start: 2025-08-12

## 2025-08-21 ENCOUNTER — HOSPITAL ENCOUNTER (OUTPATIENT)
Dept: NUCLEAR MEDICINE | Facility: HOSPITAL | Age: 70
Discharge: HOME | End: 2025-08-21
Payer: MEDICARE

## 2025-08-21 ENCOUNTER — TELEPHONE (OUTPATIENT)
Dept: CARDIOLOGY | Facility: CLINIC | Age: 70
End: 2025-08-21

## 2025-08-21 ENCOUNTER — HOSPITAL ENCOUNTER (OUTPATIENT)
Dept: CARDIOLOGY | Facility: HOSPITAL | Age: 70
Discharge: HOME | End: 2025-08-21
Payer: MEDICARE

## 2025-08-21 DIAGNOSIS — I42.0 CARDIOMYOPATHY, DILATED: ICD-10-CM

## 2025-08-21 DIAGNOSIS — I25.708 CORONARY ARTERY DISEASE OF BYPASS GRAFT OF NATIVE HEART WITH STABLE ANGINA PECTORIS: ICD-10-CM

## 2025-08-21 LAB
AORTIC DIMENSIONLESS INDEX: 0.71 (DI)
AV MEAN PRESS GRAD SYS DOP V1V2: 5 MMHG
AV VMAX SYS DOP: 146 CM/SEC
BH CV ECHO LEFT VENTRICLE GLOBAL LONGITUDINAL STRAIN: -10.9 %
BH CV ECHO MEAS - AO MAX PG: 8.5 MMHG
BH CV ECHO MEAS - AO V2 VTI: 31.7 CM
BH CV ECHO MEAS - AVA(I,D): 2.45 CM2
BH CV ECHO MEAS - EDV(CUBED): 117.6 ML
BH CV ECHO MEAS - EDV(MOD-SP2): 159 ML
BH CV ECHO MEAS - EDV(MOD-SP4): 173 ML
BH CV ECHO MEAS - EF(MOD-SP2): 50.7 %
BH CV ECHO MEAS - EF(MOD-SP4): 50.3 %
BH CV ECHO MEAS - ESV(CUBED): 46.7 ML
BH CV ECHO MEAS - ESV(MOD-SP2): 78.4 ML
BH CV ECHO MEAS - ESV(MOD-SP4): 85.9 ML
BH CV ECHO MEAS - FS: 26.5 %
BH CV ECHO MEAS - IVS/LVPW: 1.09 CM
BH CV ECHO MEAS - IVSD: 1.2 CM
BH CV ECHO MEAS - LA DIMENSION: 4.1 CM
BH CV ECHO MEAS - LAT PEAK E' VEL: 7.6 CM/SEC
BH CV ECHO MEAS - LV DIASTOLIC VOL/BSA (35-75): 77.4 CM2
BH CV ECHO MEAS - LV MASS(C)D: 213.3 GRAMS
BH CV ECHO MEAS - LV MAX PG: 5 MMHG
BH CV ECHO MEAS - LV MEAN PG: 2 MMHG
BH CV ECHO MEAS - LV SYSTOLIC VOL/BSA (12-30): 38.4 CM2
BH CV ECHO MEAS - LV V1 MAX: 112 CM/SEC
BH CV ECHO MEAS - LV V1 VTI: 22.4 CM
BH CV ECHO MEAS - LVIDD: 4.9 CM
BH CV ECHO MEAS - LVIDS: 3.6 CM
BH CV ECHO MEAS - LVOT AREA: 3.5 CM2
BH CV ECHO MEAS - LVOT DIAM: 2.1 CM
BH CV ECHO MEAS - LVPWD: 1.1 CM
BH CV ECHO MEAS - MED PEAK E' VEL: 7.4 CM/SEC
BH CV ECHO MEAS - MR MAX PG: 89.1 MMHG
BH CV ECHO MEAS - MR MAX VEL: 472 CM/SEC
BH CV ECHO MEAS - MV A DUR: 0.19 SEC
BH CV ECHO MEAS - MV A MAX VEL: 86.5 CM/SEC
BH CV ECHO MEAS - MV DEC SLOPE: 204 CM/SEC2
BH CV ECHO MEAS - MV DEC TIME: 0.28 SEC
BH CV ECHO MEAS - MV E MAX VEL: 60.3 CM/SEC
BH CV ECHO MEAS - MV E/A: 0.7
BH CV ECHO MEAS - MV MAX PG: 3.1 MMHG
BH CV ECHO MEAS - MV MEAN PG: 1 MMHG
BH CV ECHO MEAS - MV P1/2T: 106.5 MSEC
BH CV ECHO MEAS - MV V2 VTI: 27.2 CM
BH CV ECHO MEAS - MVA(P1/2T): 2.07 CM2
BH CV ECHO MEAS - MVA(VTI): 2.9 CM2
BH CV ECHO MEAS - PA ACC TIME: 0.12 SEC
BH CV ECHO MEAS - PA V2 MAX: 118 CM/SEC
BH CV ECHO MEAS - PULM A REVS DUR: 0.16 SEC
BH CV ECHO MEAS - PULM A REVS VEL: 37.4 CM/SEC
BH CV ECHO MEAS - PULM DIAS VEL: 40.6 CM/SEC
BH CV ECHO MEAS - PULM S/D: 1.27
BH CV ECHO MEAS - PULM SYS VEL: 51.5 CM/SEC
BH CV ECHO MEAS - RAP SYSTOLE: 8 MMHG
BH CV ECHO MEAS - RV MAX PG: 1.55 MMHG
BH CV ECHO MEAS - RV V1 MAX: 62.3 CM/SEC
BH CV ECHO MEAS - RV V1 VTI: 14.8 CM
BH CV ECHO MEAS - RVSP: 35 MMHG
BH CV ECHO MEAS - SV(LVOT): 77.6 ML
BH CV ECHO MEAS - SV(MOD-SP2): 80.6 ML
BH CV ECHO MEAS - SV(MOD-SP4): 87.1 ML
BH CV ECHO MEAS - SVI(LVOT): 34.7 ML/M2
BH CV ECHO MEAS - SVI(MOD-SP2): 36.1 ML/M2
BH CV ECHO MEAS - SVI(MOD-SP4): 39 ML/M2
BH CV ECHO MEAS - TAPSE (>1.6): 1.85 CM
BH CV ECHO MEAS - TR MAX PG: 27 MMHG
BH CV ECHO MEAS - TR MAX VEL: 260 CM/SEC
BH CV ECHO MEASUREMENTS AVERAGE E/E' RATIO: 8.04
BH CV REST NUCLEAR ISOTOPE DOSE: 11 MCI
BH CV STRESS BP STAGE 2: NORMAL
BH CV STRESS COMMENTS STAGE 1: NORMAL
BH CV STRESS DOSE REGADENOSON STAGE 1: 0.4
BH CV STRESS DURATION MIN STAGE 1: 0
BH CV STRESS DURATION SEC STAGE 1: 10
BH CV STRESS DURATION SEC STAGE 2: 0
BH CV STRESS HR STAGE 1: 72
BH CV STRESS HR STAGE 2: 83
BH CV STRESS HR STAGE 3: 79
BH CV STRESS NUCLEAR ISOTOPE DOSE: 33 MCI
BH CV STRESS PROTOCOL 1: NORMAL
BH CV STRESS RECOVERY BP: NORMAL MMHG
BH CV STRESS RECOVERY HR: 82 BPM
BH CV STRESS STAGE 1: 1
BH CV STRESS STAGE 2: 2
BH CV STRESS STAGE 3: 3
BH CV XLRA - TDI S': 10 CM/SEC
LEFT ATRIUM VOLUME INDEX: 38.9 ML/M2
LV EF BIPLANE MOD: 49.6 %
MAXIMAL PREDICTED HEART RATE: 150 BPM
PERCENT MAX PREDICTED HR: 55.33 %
SINUS: 4 CM
SPECT HRT GATED+EF W RNC IV: 48 %
STJ: 3 CM
STRESS BASELINE BP: NORMAL MMHG
STRESS BASELINE HR: 60 BPM
STRESS PERCENT HR: 65 %
STRESS POST PEAK BP: NORMAL MMHG
STRESS POST PEAK HR: 83 BPM
STRESS TARGET HR: 128 BPM

## 2025-08-21 PROCEDURE — 78452 HT MUSCLE IMAGE SPECT MULT: CPT

## 2025-08-21 PROCEDURE — 93356 MYOCRD STRAIN IMG SPCKL TRCK: CPT

## 2025-08-21 PROCEDURE — 93017 CV STRESS TEST TRACING ONLY: CPT

## 2025-08-21 PROCEDURE — 34310000005 TECHNETIUM TETROFOSMIN KIT: Performed by: NURSE PRACTITIONER

## 2025-08-21 PROCEDURE — A9502 TC99M TETROFOSMIN: HCPCS | Performed by: NURSE PRACTITIONER

## 2025-08-21 PROCEDURE — 25010000002 REGADENOSON 0.4 MG/5ML SOLUTION: Performed by: NURSE PRACTITIONER

## 2025-08-21 PROCEDURE — 93306 TTE W/DOPPLER COMPLETE: CPT

## 2025-08-21 RX ORDER — REGADENOSON 0.08 MG/ML
0.4 INJECTION, SOLUTION INTRAVENOUS
Status: COMPLETED | OUTPATIENT
Start: 2025-08-21 | End: 2025-08-21

## 2025-08-21 RX ADMIN — REGADENOSON 0.4 MG: 0.08 INJECTION, SOLUTION INTRAVENOUS at 09:30

## 2025-08-21 RX ADMIN — TETROFOSMIN 1 DOSE: 1.38 INJECTION, POWDER, LYOPHILIZED, FOR SOLUTION INTRAVENOUS at 08:16

## 2025-08-21 RX ADMIN — TETROFOSMIN 1 DOSE: 1.38 INJECTION, POWDER, LYOPHILIZED, FOR SOLUTION INTRAVENOUS at 09:30

## 2025-08-28 RX ORDER — CLOPIDOGREL BISULFATE 75 MG/1
75 TABLET ORAL DAILY
Qty: 30 TABLET | Refills: 6 | Status: SHIPPED | OUTPATIENT
Start: 2025-08-28

## (undated) DEVICE — SHT AIR TRANSFR COMFRT GLIDE LAT 40X80IN

## (undated) DEVICE — OCTA,PERSEID,2-2-2-2-2,D-CURVE: Brand: OCTARAY MAPPING CATHETER

## (undated) DEVICE — NC TREK NEO™ CORONARY DILATATION CATHETER 2.00 MM X 12 MM / RAPID-EXCHANGE: Brand: NC TREK NEO™

## (undated) DEVICE — Device: Brand: WEBSTER CS

## (undated) DEVICE — CABL CONN CATH EP UMB 48IN

## (undated) DEVICE — CATH GUIDE LAUNCHER EBU4 7F

## (undated) DEVICE — PINNACLE INTRODUCER SHEATH: Brand: PINNACLE

## (undated) DEVICE — CONTRST ISOVUE300 61PCT 50ML

## (undated) DEVICE — Device: Brand: REFERENCE PATCH CARTO 3

## (undated) DEVICE — CATH GUIDE LAUNCHER EBU3.5 6F 100CM

## (undated) DEVICE — CATH DIAG IMPULSE FR4 5F 100CM

## (undated) DEVICE — CATH INTRAVASC/LITHO C2PLUS 2.5X12MM

## (undated) DEVICE — GW RUNTHROUGH NS HYPERCOAT .014 3X180CM

## (undated) DEVICE — Device: Brand: OMNIWIRE PRESSURE GUIDE WIRE

## (undated) DEVICE — PCH SURG STRL INST SLF/SEAL 7.5X13IN

## (undated) DEVICE — ST INTRO PERFORMER W/GW J/TP .038IN 14FR

## (undated) DEVICE — STPCK 3WY HP ROT

## (undated) DEVICE — CATH MAP ACHIEVE/ADVANCE 8/ELECTRD/6MM LP/20MM

## (undated) DEVICE — ST ACC MICROPUNCTURE STFF/CANN PLAT/TP 4F 21G 40CM

## (undated) DEVICE — TBG NAMIC PRESS MONTR A/ F/M 12IN

## (undated) DEVICE — TBG IV DRIP CHAMBER MACRO SGL 72IN

## (undated) DEVICE — INTERFACE CABLE: Brand: CARTO 3 SYSTEM ECO INTERFACE CABLE

## (undated) DEVICE — NC TREK NEO™ CORONARY DILATATION CATHETER 3.25 MM X 12 MM / RAPID-EXCHANGE: Brand: NC TREK NEO™

## (undated) DEVICE — CABL CONN CATH EP COAXL UMB 72IN

## (undated) DEVICE — BND COMPR ZEPHYR VASC LG

## (undated) DEVICE — BLANKT WARM UNDER/BDY FUL/ACC A/ 90X206CM

## (undated) DEVICE — ELECTRD DEFIB M/FUNC PROPADZ RADIOL 2PK

## (undated) DEVICE — SHEATH FLXCATH STEER 12FR

## (undated) DEVICE — Device: Brand: TORAYGUIDE GUIDEWIRE

## (undated) DEVICE — PAD E/S GRND SGL/FOIL 9FT/CORD DISP

## (undated) DEVICE — BOWL PLSTC MD 16OZ BLU STRL

## (undated) DEVICE — DEV INFL COMPAK W/ACCESSPLUS IN4530

## (undated) DEVICE — PK TRY HEART CATH 50

## (undated) DEVICE — Device: Brand: CARTO 3

## (undated) DEVICE — HI-TORQUE BALANCE MIDDLEWEIGHT UNIVERSAL II GUIDE WIRE J TIP PAK 190 CM: Brand: HI-TORQUE BALANCE MIDDLEWEIGHT UNIVERSAL II

## (undated) DEVICE — DRAPE, RADIAL, STERILE: Brand: MEDLINE

## (undated) DEVICE — Device: Brand: VIZIGO

## (undated) DEVICE — BALN NC/EUPHORA RX 2.50X12MM

## (undated) DEVICE — CATH MPAC STP 6F: Brand: SUPER TORQUE

## (undated) DEVICE — Device: Brand: SMARTABLATE

## (undated) DEVICE — SOUNDSTAR ECO 8F G CATHETER: Brand: SOUNDSTAR

## (undated) DEVICE — NDL TRNSEP BRK XS LNG 18G 98CM A/

## (undated) DEVICE — PROVE COVER: Brand: UNBRANDED

## (undated) DEVICE — SYS CLS VASC/VENI VASCADE MVP 6TO12F

## (undated) DEVICE — SYR LL TP 10ML STRL

## (undated) DEVICE — CATH ABL QDOT MICRO BIDIR D/F CRV IDE

## (undated) DEVICE — GW PTFE EMERALD HEPCOAT FC J TIP STD .035 3MM 150CM

## (undated) DEVICE — CATH DIAG IMPULSE FL3 5F 100CM

## (undated) DEVICE — GW EMR FIX EXCHG J STD .035 3MM 260CM

## (undated) DEVICE — CATH CRYO/ABL ARCTICFRONTADVANCE MAP 12F 28MM

## (undated) DEVICE — GLIDESHEATH SLENDER STAINLESS STEEL KIT: Brand: GLIDESHEATH SLENDER

## (undated) DEVICE — GW TRNSEP SAFESEPT LT/ATRIAL RO 135CM .014IN

## (undated) DEVICE — NC TREK NEO™ CORONARY DILATATION CATHETER 2.50 X 12 MM / RAPID-EXCHANGE: Brand: NC TREK NEO™